# Patient Record
Sex: MALE | Race: BLACK OR AFRICAN AMERICAN | Employment: OTHER | URBAN - METROPOLITAN AREA
[De-identification: names, ages, dates, MRNs, and addresses within clinical notes are randomized per-mention and may not be internally consistent; named-entity substitution may affect disease eponyms.]

---

## 2019-05-23 ENCOUNTER — OFFICE VISIT (OUTPATIENT)
Dept: FAMILY MEDICINE | Facility: CLINIC | Age: 84
End: 2019-05-23
Payer: MEDICARE

## 2019-05-23 VITALS
WEIGHT: 136.56 LBS | HEART RATE: 86 BPM | DIASTOLIC BLOOD PRESSURE: 62 MMHG | BODY MASS INDEX: 22.75 KG/M2 | TEMPERATURE: 98 F | OXYGEN SATURATION: 96 % | SYSTOLIC BLOOD PRESSURE: 130 MMHG | HEIGHT: 65 IN

## 2019-05-23 DIAGNOSIS — R79.9 ABNORMAL FINDING OF BLOOD CHEMISTRY: ICD-10-CM

## 2019-05-23 DIAGNOSIS — M79.18 MYALGIA, OTHER SITE: ICD-10-CM

## 2019-05-23 DIAGNOSIS — K21.9 GASTROESOPHAGEAL REFLUX DISEASE, ESOPHAGITIS PRESENCE NOT SPECIFIED: ICD-10-CM

## 2019-05-23 DIAGNOSIS — Z12.5 ENCOUNTER FOR SCREENING FOR MALIGNANT NEOPLASM OF PROSTATE: ICD-10-CM

## 2019-05-23 DIAGNOSIS — R93.89 ABNORMAL FINDINGS ON DIAGNOSTIC IMAGING OF OTHER SPECIFIED BODY STRUCTURES: ICD-10-CM

## 2019-05-23 DIAGNOSIS — Z00.00 ROUTINE PHYSICAL EXAMINATION: Primary | ICD-10-CM

## 2019-05-23 DIAGNOSIS — Z11.4 ENCOUNTER FOR SCREENING FOR HIV: ICD-10-CM

## 2019-05-23 PROCEDURE — 99204 OFFICE O/P NEW MOD 45 MIN: CPT | Mod: S$GLB,,, | Performed by: FAMILY MEDICINE

## 2019-05-23 PROCEDURE — 99999 PR PBB SHADOW E&M-NEW PATIENT-LVL III: CPT | Mod: PBBFAC,,, | Performed by: FAMILY MEDICINE

## 2019-05-23 PROCEDURE — 99204 PR OFFICE/OUTPT VISIT, NEW, LEVL IV, 45-59 MIN: ICD-10-PCS | Mod: S$GLB,,, | Performed by: FAMILY MEDICINE

## 2019-05-23 PROCEDURE — 99999 PR PBB SHADOW E&M-NEW PATIENT-LVL III: ICD-10-PCS | Mod: PBBFAC,,, | Performed by: FAMILY MEDICINE

## 2019-05-23 RX ORDER — PHENOL/SODIUM PHENOLATE
1 AEROSOL, SPRAY (ML) MUCOUS MEMBRANE DAILY PRN
Qty: 30 EACH | Refills: 0 | Status: SHIPPED | OUTPATIENT
Start: 2019-05-23 | End: 2019-05-24 | Stop reason: SDUPTHER

## 2019-05-23 NOTE — PROGRESS NOTES
[facility]  Progress Note    SUBJECTIVE:     Chief Complaint   Patient presents with    Establish Care    No appetite     3 days     Abdominal Pain     3 days        HPI   Cale Anthony Sr.  is a 88 y.o. male  Here to establish care and for c/o epigastric abdominal pain. Rates pain as mild, dull. Has no appetite for 3 days. Also has been vomiting when he eats something. No blood or bile seen. hasn't tried anything for it. hasn't seen a doctor in over 50 years. Patient has no other new complaints/problems at this time.      Review of patient's allergies indicates:  No Known Allergies    History reviewed. No pertinent past medical history.  History reviewed. No pertinent surgical history.  Family History   Problem Relation Age of Onset    Diabetes Mother     No Known Problems Father     No Known Problems Sister     Cancer Brother      Social History     Tobacco Use    Smoking status: Never Smoker    Smokeless tobacco: Never Used   Substance Use Topics    Alcohol use: Not Currently    Drug use: Not Currently        Review of Systems   Constitutional: Negative for chills, diaphoresis, fever, malaise/fatigue and weight loss.        + no appetite   HENT: Negative.  Negative for congestion, ear pain and sore throat.    Eyes: Negative for photophobia and discharge.   Respiratory: Negative.  Negative for cough, shortness of breath and wheezing.    Cardiovascular: Negative.  Negative for chest pain and palpitations.   Gastrointestinal: Positive for abdominal pain and heartburn. Negative for blood in stool, constipation, diarrhea, melena, nausea and vomiting.   Genitourinary: Negative.  Negative for dysuria and hematuria.   Musculoskeletal: Negative for back pain and myalgias.   Skin: Negative for itching and rash.   Neurological: Negative for dizziness, sensory change, focal weakness, weakness and headaches.   All other systems reviewed and are negative.    OBJECTIVE:     Vitals:    05/23/19 1421   BP:  130/62   Pulse: 86   Temp: 98.2 °F (36.8 °C)     Body mass index is 22.73 kg/m².    Physical Exam   Constitutional: He is oriented to person, place, and time. He appears distressed (mild).   HENT:   Head: Normocephalic and atraumatic.   Right Ear: Tympanic membrane is not perforated, not erythematous and not bulging. No hemotympanum.   Left Ear: Tympanic membrane is not perforated, not erythematous and not bulging. No hemotympanum.   Nose: Nose normal.   Mouth/Throat: Oropharynx is clear and moist. No oropharyngeal exudate.   Eyes: Pupils are equal, round, and reactive to light. Conjunctivae and EOM are normal.   Neck: No thyromegaly present.   Cardiovascular: Normal rate, regular rhythm and normal heart sounds. Exam reveals no gallop and no friction rub.   No murmur heard.  Pulmonary/Chest: Effort normal and breath sounds normal. No respiratory distress. He has no wheezes. He has no rales. He exhibits no tenderness.   Abdominal: Soft. Bowel sounds are normal. He exhibits no distension. There is tenderness (to palpation of epigastric area). There is no rebound and no guarding.   Musculoskeletal: Normal range of motion. He exhibits no edema or tenderness.   Lymphadenopathy:     He has no cervical adenopathy.   Neurological: He is alert and oriented to person, place, and time.   Skin: Skin is warm. No rash noted. He is not diaphoretic. No erythema.       Old records were reviewed. Labs and/or images were independently reviewed.    ASSESSMENT     1. Routine physical examination    2. Abnormal finding of blood chemistry     3. Abnormal findings on diagnostic imaging of other specified body structures     4. Encounter for screening for malignant neoplasm of prostate     5. Myalgia, other site     6. Encounter for screening for HIV     7. Gastroesophageal reflux disease, esophagitis presence not specified        PLAN:     Routine physical examination  -     CBC auto differential; Future  -     Comprehensive metabolic  panel; Future  -     Hemoglobin A1c; Future  -     Lipid panel; Future  -     TSH; Future  -     T4, free; Future  -     PSA, Screening; Future; Expected date: 05/23/2019  -     Vitamin D; Future; Expected date: 05/23/2019  -     HIV 1/2 Ag/Ab (4th Gen); Future; Expected date: 05/23/2019  -     Hepatitis panel, acute; Future; Expected date: 05/23/2019  -     We briefly discussed diet, exercise, and routine preventive exams. All questions and comments addressed.    Encounter for screening for malignant neoplasm of prostate   -     PSA, Screening; Future; Expected date: 05/23/2019    Encounter for screening for HIV   -     HIV 1/2 Ag/Ab (4th Gen); Future; Expected date: 05/23/2019    Gastroesophageal reflux disease, esophagitis presence not specified  -     (pyxis) gi cocktail (mylanta 30 mL, lidocaine 2 % viscous 10 mL, dicyclomine 10 mL) 50 mL  -     Start omeprazole 20 mg TbEC; Take 1 tablet by mouth daily as needed.  Dispense: 30 each; Refill: 0  -     Counseled patient about healthy diet, exercise habits, and to increase physical activity.    RTC KATHYA Santoyo MD  05/23/2019 2:42 PM

## 2019-05-24 ENCOUNTER — TELEPHONE (OUTPATIENT)
Dept: FAMILY MEDICINE | Facility: CLINIC | Age: 84
End: 2019-05-24

## 2019-05-24 ENCOUNTER — LAB VISIT (OUTPATIENT)
Dept: LAB | Facility: HOSPITAL | Age: 84
End: 2019-05-24
Attending: FAMILY MEDICINE
Payer: MEDICARE

## 2019-05-24 DIAGNOSIS — M79.18 MYALGIA, OTHER SITE: ICD-10-CM

## 2019-05-24 DIAGNOSIS — R93.89 ABNORMAL FINDINGS ON DIAGNOSTIC IMAGING OF OTHER SPECIFIED BODY STRUCTURES: ICD-10-CM

## 2019-05-24 DIAGNOSIS — K21.9 GASTROESOPHAGEAL REFLUX DISEASE, ESOPHAGITIS PRESENCE NOT SPECIFIED: ICD-10-CM

## 2019-05-24 DIAGNOSIS — R97.20 ELEVATED PSA: ICD-10-CM

## 2019-05-24 DIAGNOSIS — R79.9 ABNORMAL FINDING OF BLOOD CHEMISTRY: ICD-10-CM

## 2019-05-24 DIAGNOSIS — Z11.4 ENCOUNTER FOR SCREENING FOR HIV: ICD-10-CM

## 2019-05-24 DIAGNOSIS — E55.9 VITAMIN D DEFICIENCY: Primary | ICD-10-CM

## 2019-05-24 DIAGNOSIS — Z00.00 ROUTINE PHYSICAL EXAMINATION: ICD-10-CM

## 2019-05-24 DIAGNOSIS — Z12.5 ENCOUNTER FOR SCREENING FOR MALIGNANT NEOPLASM OF PROSTATE: ICD-10-CM

## 2019-05-24 LAB
25(OH)D3+25(OH)D2 SERPL-MCNC: 9 NG/ML (ref 30–96)
ALBUMIN SERPL BCP-MCNC: 3.8 G/DL (ref 3.5–5.2)
ALP SERPL-CCNC: 52 U/L (ref 55–135)
ALT SERPL W/O P-5'-P-CCNC: 8 U/L (ref 10–44)
ANION GAP SERPL CALC-SCNC: 9 MMOL/L (ref 8–16)
AST SERPL-CCNC: 15 U/L (ref 10–40)
BASOPHILS # BLD AUTO: 0.03 K/UL (ref 0–0.2)
BASOPHILS NFR BLD: 0.2 % (ref 0–1.9)
BILIRUB SERPL-MCNC: 0.6 MG/DL (ref 0.1–1)
BUN SERPL-MCNC: 15 MG/DL (ref 8–23)
CALCIUM SERPL-MCNC: 10.1 MG/DL (ref 8.7–10.5)
CHLORIDE SERPL-SCNC: 99 MMOL/L (ref 95–110)
CHOLEST SERPL-MCNC: 184 MG/DL (ref 120–199)
CHOLEST/HDLC SERPL: 3.2 {RATIO} (ref 2–5)
CO2 SERPL-SCNC: 29 MMOL/L (ref 23–29)
COMPLEXED PSA SERPL-MCNC: 5.7 NG/ML (ref 0–4)
CREAT SERPL-MCNC: 0.8 MG/DL (ref 0.5–1.4)
DIFFERENTIAL METHOD: ABNORMAL
EOSINOPHIL # BLD AUTO: 0 K/UL (ref 0–0.5)
EOSINOPHIL NFR BLD: 0.1 % (ref 0–8)
ERYTHROCYTE [DISTWIDTH] IN BLOOD BY AUTOMATED COUNT: 13.8 % (ref 11.5–14.5)
EST. GFR  (AFRICAN AMERICAN): >60 ML/MIN/1.73 M^2
EST. GFR  (NON AFRICAN AMERICAN): >60 ML/MIN/1.73 M^2
ESTIMATED AVG GLUCOSE: 111 MG/DL (ref 68–131)
GLUCOSE SERPL-MCNC: 118 MG/DL (ref 70–110)
HAV IGM SERPL QL IA: NEGATIVE
HBA1C MFR BLD HPLC: 5.5 % (ref 4–5.6)
HBV CORE IGM SERPL QL IA: NEGATIVE
HBV SURFACE AG SERPL QL IA: NEGATIVE
HCT VFR BLD AUTO: 43.4 % (ref 40–54)
HCV AB SERPL QL IA: NEGATIVE
HDLC SERPL-MCNC: 57 MG/DL (ref 40–75)
HDLC SERPL: 31 % (ref 20–50)
HGB BLD-MCNC: 14 G/DL (ref 14–18)
HIV 1+2 AB+HIV1 P24 AG SERPL QL IA: NEGATIVE
IMM GRANULOCYTES # BLD AUTO: 0.05 K/UL (ref 0–0.04)
IMM GRANULOCYTES NFR BLD AUTO: 0.4 % (ref 0–0.5)
LDLC SERPL CALC-MCNC: 114.8 MG/DL (ref 63–159)
LYMPHOCYTES # BLD AUTO: 1.8 K/UL (ref 1–4.8)
LYMPHOCYTES NFR BLD: 13.1 % (ref 18–48)
MCH RBC QN AUTO: 26.6 PG (ref 27–31)
MCHC RBC AUTO-ENTMCNC: 32.3 G/DL (ref 32–36)
MCV RBC AUTO: 83 FL (ref 82–98)
MONOCYTES # BLD AUTO: 0.9 K/UL (ref 0.3–1)
MONOCYTES NFR BLD: 6.5 % (ref 4–15)
NEUTROPHILS # BLD AUTO: 10.9 K/UL (ref 1.8–7.7)
NEUTROPHILS NFR BLD: 79.7 % (ref 38–73)
NONHDLC SERPL-MCNC: 127 MG/DL
NRBC BLD-RTO: 0 /100 WBC
PLATELET # BLD AUTO: 261 K/UL (ref 150–350)
PMV BLD AUTO: 11.6 FL (ref 9.2–12.9)
POTASSIUM SERPL-SCNC: 4.1 MMOL/L (ref 3.5–5.1)
PROT SERPL-MCNC: 7.9 G/DL (ref 6–8.4)
RBC # BLD AUTO: 5.26 M/UL (ref 4.6–6.2)
SODIUM SERPL-SCNC: 137 MMOL/L (ref 136–145)
T4 FREE SERPL-MCNC: 1.25 NG/DL (ref 0.71–1.51)
TRIGL SERPL-MCNC: 61 MG/DL (ref 30–150)
TSH SERPL DL<=0.005 MIU/L-ACNC: 0.82 UIU/ML (ref 0.4–4)
WBC # BLD AUTO: 13.7 K/UL (ref 3.9–12.7)

## 2019-05-24 PROCEDURE — 84439 ASSAY OF FREE THYROXINE: CPT

## 2019-05-24 PROCEDURE — 80053 COMPREHEN METABOLIC PANEL: CPT

## 2019-05-24 PROCEDURE — 36415 COLL VENOUS BLD VENIPUNCTURE: CPT | Mod: PO

## 2019-05-24 PROCEDURE — 84443 ASSAY THYROID STIM HORMONE: CPT

## 2019-05-24 PROCEDURE — 86703 HIV-1/HIV-2 1 RESULT ANTBDY: CPT

## 2019-05-24 PROCEDURE — 80074 ACUTE HEPATITIS PANEL: CPT

## 2019-05-24 PROCEDURE — 84153 ASSAY OF PSA TOTAL: CPT

## 2019-05-24 PROCEDURE — 82306 VITAMIN D 25 HYDROXY: CPT

## 2019-05-24 PROCEDURE — 85025 COMPLETE CBC W/AUTO DIFF WBC: CPT

## 2019-05-24 PROCEDURE — 83036 HEMOGLOBIN GLYCOSYLATED A1C: CPT

## 2019-05-24 PROCEDURE — 80061 LIPID PANEL: CPT

## 2019-05-24 RX ORDER — ERGOCALCIFEROL 1.25 MG/1
50000 CAPSULE ORAL
Qty: 12 CAPSULE | Refills: 3 | Status: SHIPPED | OUTPATIENT
Start: 2019-05-24 | End: 2020-05-19

## 2019-05-24 RX ORDER — PHENOL/SODIUM PHENOLATE
1 AEROSOL, SPRAY (ML) MUCOUS MEMBRANE DAILY PRN
Qty: 30 EACH | Refills: 0 | Status: SHIPPED | OUTPATIENT
Start: 2019-05-24 | End: 2019-06-10 | Stop reason: SDUPTHER

## 2019-05-24 NOTE — TELEPHONE ENCOUNTER
----- Message from Radha Wang sent at 5/24/2019  7:24 AM CDT -----  Contact: Mr Willie Anthony   /   son [phone]  Type: New Prescription patient request [facility] pharmacy     Patient seen by doctor on yesterday new prescription for Omeprazole was called into [facility].   Patient states was inform by [facility] did not receive prescription     Who Called:   Mr Anthony/ patient son    Refill or New Rx: new prescription omeprazole 20 mg TbEC   Patient requesting that prescription be called into [facility] in [place]     RX Name and Strength:omeprazole 20 mg TbEC     How is the patient currently taking it? (ex. 1XDay)  Is this a 30 day or 90 day RX  Preferred Pharmacy with phone number:  [facility] in [place]  Local or Mail Order:  Ordering Provider:Dr Santoyo  Would the patient rather a call back or a response via [facility]? call  Best Call Back Number:[phone]  Additional Information: Please call Mr Anthony to let know done

## 2019-06-04 ENCOUNTER — TELEPHONE (OUTPATIENT)
Dept: ADMINISTRATIVE | Facility: HOSPITAL | Age: 84
End: 2019-06-04

## 2019-06-07 ENCOUNTER — TELEPHONE (OUTPATIENT)
Dept: FAMILY MEDICINE | Facility: CLINIC | Age: 84
End: 2019-06-07

## 2019-06-07 NOTE — TELEPHONE ENCOUNTER
----- Message from Shaina Guzman sent at 6/7/2019  3:25 PM CDT -----  Patient's son is requesting patient's lab and test results. He never received any notice. please call at [phone]. thanks

## 2019-06-07 NOTE — TELEPHONE ENCOUNTER
Spoke with pt informed of results and referral placed. Advised pt if he would like his son be informed of results that and involvement of care form must be completed. Pt states he complete this during next OV. Pt verbalized understanding.

## 2019-06-10 ENCOUNTER — OFFICE VISIT (OUTPATIENT)
Dept: FAMILY MEDICINE | Facility: CLINIC | Age: 84
End: 2019-06-10
Payer: MEDICARE

## 2019-06-10 ENCOUNTER — TELEPHONE (OUTPATIENT)
Dept: FAMILY MEDICINE | Facility: CLINIC | Age: 84
End: 2019-06-10

## 2019-06-10 ENCOUNTER — HOSPITAL ENCOUNTER (OUTPATIENT)
Dept: RADIOLOGY | Facility: HOSPITAL | Age: 84
Discharge: HOME | End: 2019-06-10
Attending: FAMILY MEDICINE
Payer: MEDICARE

## 2019-06-10 VITALS
TEMPERATURE: 98 F | WEIGHT: 135.06 LBS | OXYGEN SATURATION: 97 % | HEART RATE: 71 BPM | HEIGHT: 65 IN | DIASTOLIC BLOOD PRESSURE: 70 MMHG | SYSTOLIC BLOOD PRESSURE: 136 MMHG | BODY MASS INDEX: 22.5 KG/M2

## 2019-06-10 DIAGNOSIS — K59.00 CONSTIPATION, UNSPECIFIED CONSTIPATION TYPE: ICD-10-CM

## 2019-06-10 DIAGNOSIS — R31.9 HEMATURIA, UNSPECIFIED TYPE: Primary | ICD-10-CM

## 2019-06-10 DIAGNOSIS — K59.00 CONSTIPATION, UNSPECIFIED CONSTIPATION TYPE: Primary | ICD-10-CM

## 2019-06-10 DIAGNOSIS — K21.9 GASTROESOPHAGEAL REFLUX DISEASE, ESOPHAGITIS PRESENCE NOT SPECIFIED: ICD-10-CM

## 2019-06-10 DIAGNOSIS — R10.9 ABDOMINAL CRAMPS: ICD-10-CM

## 2019-06-10 PROCEDURE — 74019 RADEX ABDOMEN 2 VIEWS: CPT | Mod: 26,,, | Performed by: RADIOLOGY

## 2019-06-10 PROCEDURE — 99999 PR PBB SHADOW E&M-EST. PATIENT-LVL III: ICD-10-PCS | Mod: PBBFAC,,, | Performed by: FAMILY MEDICINE

## 2019-06-10 PROCEDURE — 1101F PR PT FALLS ASSESS DOC 0-1 FALLS W/OUT INJ PAST YR: ICD-10-PCS | Mod: CPTII,S$GLB,, | Performed by: FAMILY MEDICINE

## 2019-06-10 PROCEDURE — 1101F PT FALLS ASSESS-DOCD LE1/YR: CPT | Mod: CPTII,S$GLB,, | Performed by: FAMILY MEDICINE

## 2019-06-10 PROCEDURE — 99999 PR PBB SHADOW E&M-EST. PATIENT-LVL III: CPT | Mod: PBBFAC,,, | Performed by: FAMILY MEDICINE

## 2019-06-10 PROCEDURE — 99214 OFFICE O/P EST MOD 30 MIN: CPT | Mod: S$GLB,,, | Performed by: FAMILY MEDICINE

## 2019-06-10 PROCEDURE — 74019 XR ABDOMEN FLAT AND ERECT: ICD-10-PCS | Mod: 26,,, | Performed by: RADIOLOGY

## 2019-06-10 PROCEDURE — 74019 RADEX ABDOMEN 2 VIEWS: CPT | Mod: TC,FY,PO

## 2019-06-10 PROCEDURE — 99214 PR OFFICE/OUTPT VISIT, EST, LEVL IV, 30-39 MIN: ICD-10-PCS | Mod: S$GLB,,, | Performed by: FAMILY MEDICINE

## 2019-06-10 RX ORDER — DOCUSATE SODIUM 100 MG/1
100 CAPSULE, LIQUID FILLED ORAL 2 TIMES DAILY PRN
Qty: 60 CAPSULE | Refills: 3 | Status: ON HOLD | OUTPATIENT
Start: 2019-06-10 | End: 2019-06-30 | Stop reason: HOSPADM

## 2019-06-10 RX ORDER — PHENOL/SODIUM PHENOLATE
1 AEROSOL, SPRAY (ML) MUCOUS MEMBRANE DAILY PRN
Qty: 30 EACH | Refills: 2 | Status: SHIPPED | OUTPATIENT
Start: 2019-06-10 | End: 2019-07-08

## 2019-06-10 NOTE — PROGRESS NOTES
[facility]  Progress Note    SUBJECTIVE:     Chief Complaint   Patient presents with    Abdominal Pain     states it staretd 6 days ago        HPI   Cale Anthony Sr.  is a 88 y.o. male here with son for c/o abdominal pain/cramps for the past 5-6 days. Last bowel movement was around that time. Hasn't had much of an appetite. No current pain, fevers, chills, blood in the stool at this time. Still takes the omeprazole as needed.    Review of patient's allergies indicates:  No Known Allergies    History reviewed. No pertinent past medical history.  History reviewed. No pertinent surgical history.  Family History   Problem Relation Age of Onset    Diabetes Mother     No Known Problems Father     No Known Problems Sister     Cancer Brother      Social History     Tobacco Use    Smoking status: Never Smoker    Smokeless tobacco: Never Used   Substance Use Topics    Alcohol use: Not Currently    Drug use: Not Currently        Review of Systems   Constitutional: Negative for chills, fever and malaise/fatigue.   HENT: Negative.    Respiratory: Negative.  Negative for cough and shortness of breath.    Cardiovascular: Negative.  Negative for chest pain.   Gastrointestinal: Positive for abdominal pain (cramps) and constipation. Negative for nausea and vomiting.   Genitourinary: Negative.    Neurological: Negative for weakness and headaches.   All other systems reviewed and are negative.    OBJECTIVE:     Vitals:    06/10/19 1108   BP: 136/70   Pulse: 71   Temp: 98 °F (36.7 °C)     Body mass index is 22.47 kg/m².    Physical Exam   Constitutional: He is oriented to person, place, and time and well-developed, well-nourished, and in no distress. No distress.   HENT:   Head: Normocephalic and atraumatic.   Nose: Nose normal.   Eyes: Conjunctivae and EOM are normal.   Cardiovascular: Normal rate, regular rhythm and normal heart sounds. Exam reveals no gallop and no friction rub.   No murmur  heard.  Pulmonary/Chest: Effort normal and breath sounds normal. No respiratory distress. He has no wheezes. He has no rales. He exhibits no tenderness.   Abdominal: Soft. Bowel sounds are normal. He exhibits no distension. There is no tenderness (no epigastric or pelvic pain). There is no rebound.   Neurological: He is alert and oriented to person, place, and time.   Skin: Skin is warm. He is not diaphoretic.       Old records were reviewed. Labs and/or images were independently reviewed.    ASSESSMENT     1. Constipation, unspecified constipation type    2. Gastroesophageal reflux disease, esophagitis presence not specified    3. Abdominal cramps        PLAN:     Constipation, unspecified constipation type  -     Start docusate sodium (COLACE) 100 MG capsule; Take 1 capsule (100 mg total) by mouth 2 (two) times daily as needed for Constipation.  Dispense: 60 capsule; Refill: 3  -     X-Ray Abdomen Flat And Erect; Future; Expected date: 06/10/2019  -     Discussed importance of increasing fiber.     Gastroesophageal reflux disease, esophagitis presence not specified  -     omeprazole 20 mg TbEC; Take 1 tablet by mouth daily as needed.  Dispense: 30 each; Refill: 2  -     Counseled patient about healthy diet, exercise habits, and to increase physical activity.    Abdominal cramps  -     Urinalysis; Future. R/o UTI for completeness.      RTC PRCARLA Santoyo MD  06/10/2019 11:24 AM

## 2019-06-11 NOTE — TELEPHONE ENCOUNTER
Blood seen in urine. Refer to urology.  Spoke with the pt and informed  Of the above details.  Patient verbalized understandings.

## 2019-06-12 ENCOUNTER — TELEPHONE (OUTPATIENT)
Dept: FAMILY MEDICINE | Facility: CLINIC | Age: 84
End: 2019-06-12

## 2019-06-12 NOTE — TELEPHONE ENCOUNTER
----- Message from Shashank Crook sent at 6/12/2019  8:38 AM CDT -----  Contact: Self/[phone]  Type: Patient Call Back    Who called:Patient    What is the request in detail:The patient would like to speak top the staff regarding a recent test.    Would the patient rather a call back or a response via [facility]? Call back    Best call back number:[phone]      Thank you

## 2019-06-12 NOTE — TELEPHONE ENCOUNTER
Spoke w/Pts son stating the pt received a call but didn't understand the results. Informed of results and referral placed. [facility] pt has a appt scheduled 7/3/19 with Urology.

## 2019-06-13 ENCOUNTER — OFFICE VISIT (OUTPATIENT)
Dept: UROLOGY | Facility: CLINIC | Age: 84
End: 2019-06-13
Payer: MEDICARE

## 2019-06-13 ENCOUNTER — LAB VISIT (OUTPATIENT)
Dept: LAB | Facility: HOSPITAL | Age: 84
End: 2019-06-13
Payer: MEDICARE

## 2019-06-13 VITALS
HEART RATE: 68 BPM | SYSTOLIC BLOOD PRESSURE: 183 MMHG | HEIGHT: 65 IN | BODY MASS INDEX: 22.51 KG/M2 | DIASTOLIC BLOOD PRESSURE: 72 MMHG | WEIGHT: 135.13 LBS

## 2019-06-13 DIAGNOSIS — R31.29 MICROSCOPIC HEMATURIA: ICD-10-CM

## 2019-06-13 DIAGNOSIS — N40.1 BPH WITH URINARY OBSTRUCTION: ICD-10-CM

## 2019-06-13 DIAGNOSIS — R97.20 ELEVATED PSA: ICD-10-CM

## 2019-06-13 DIAGNOSIS — N40.1 BPH WITH URINARY OBSTRUCTION: Primary | ICD-10-CM

## 2019-06-13 DIAGNOSIS — N13.8 BPH WITH URINARY OBSTRUCTION: Primary | ICD-10-CM

## 2019-06-13 DIAGNOSIS — N13.8 BPH WITH URINARY OBSTRUCTION: ICD-10-CM

## 2019-06-13 LAB
BACTERIA #/AREA URNS AUTO: ABNORMAL /HPF
BILIRUB SERPL-MCNC: NORMAL MG/DL
BILIRUB UR QL STRIP: NEGATIVE
BLOOD URINE, POC: NORMAL
CLARITY UR REFRACT.AUTO: CLEAR
COLOR UR AUTO: ABNORMAL
COLOR, POC UA: NORMAL
GLUCOSE UR QL STRIP: NEGATIVE
GLUCOSE UR QL STRIP: NORMAL
HGB UR QL STRIP: ABNORMAL
HYALINE CASTS UR QL AUTO: 2 /LPF
KETONES UR QL STRIP: NEGATIVE
KETONES UR QL STRIP: NORMAL
LEUKOCYTE ESTERASE UR QL STRIP: ABNORMAL
LEUKOCYTE ESTERASE URINE, POC: NORMAL
MICROSCOPIC COMMENT: ABNORMAL
NITRITE UR QL STRIP: NEGATIVE
NITRITE, POC UA: NORMAL
PH UR STRIP: 6 [PH] (ref 5–8)
PH, POC UA: 6
PROT UR QL STRIP: NEGATIVE
PROTEIN, POC: NORMAL
RBC #/AREA URNS AUTO: 10 /HPF (ref 0–4)
SP GR UR STRIP: 1.02 (ref 1–1.03)
SPECIFIC GRAVITY, POC UA: 1.01
SQUAMOUS #/AREA URNS AUTO: 1 /HPF
URN SPEC COLLECT METH UR: ABNORMAL
UROBILINOGEN, POC UA: NORMAL
WBC #/AREA URNS AUTO: 40 /HPF (ref 0–5)

## 2019-06-13 PROCEDURE — 88112 CYTOLOGY SPECIMEN-URINE: ICD-10-PCS | Mod: 26,,, | Performed by: PATHOLOGY

## 2019-06-13 PROCEDURE — 81002 URINALYSIS NONAUTO W/O SCOPE: CPT | Mod: S$GLB,,, | Performed by: NURSE PRACTITIONER

## 2019-06-13 PROCEDURE — 1101F PR PT FALLS ASSESS DOC 0-1 FALLS W/OUT INJ PAST YR: ICD-10-PCS | Mod: CPTII,S$GLB,, | Performed by: NURSE PRACTITIONER

## 2019-06-13 PROCEDURE — 81002 POCT URINE DIPSTICK WITHOUT MICROSCOPE: ICD-10-PCS | Mod: S$GLB,,, | Performed by: NURSE PRACTITIONER

## 2019-06-13 PROCEDURE — 81001 URINALYSIS AUTO W/SCOPE: CPT

## 2019-06-13 PROCEDURE — 99203 OFFICE O/P NEW LOW 30 MIN: CPT | Mod: 25,S$GLB,, | Performed by: NURSE PRACTITIONER

## 2019-06-13 PROCEDURE — 36415 COLL VENOUS BLD VENIPUNCTURE: CPT

## 2019-06-13 PROCEDURE — 84154 ASSAY OF PSA FREE: CPT

## 2019-06-13 PROCEDURE — 1101F PT FALLS ASSESS-DOCD LE1/YR: CPT | Mod: CPTII,S$GLB,, | Performed by: NURSE PRACTITIONER

## 2019-06-13 PROCEDURE — 99999 PR PBB SHADOW E&M-EST. PATIENT-LVL III: CPT | Mod: PBBFAC,,, | Performed by: NURSE PRACTITIONER

## 2019-06-13 PROCEDURE — 99999 PR PBB SHADOW E&M-EST. PATIENT-LVL III: ICD-10-PCS | Mod: PBBFAC,,, | Performed by: NURSE PRACTITIONER

## 2019-06-13 PROCEDURE — 99203 PR OFFICE/OUTPT VISIT, NEW, LEVL III, 30-44 MIN: ICD-10-PCS | Mod: 25,S$GLB,, | Performed by: NURSE PRACTITIONER

## 2019-06-13 PROCEDURE — 84153 ASSAY OF PSA TOTAL: CPT

## 2019-06-13 PROCEDURE — 88112 CYTOPATH CELL ENHANCE TECH: CPT | Performed by: PATHOLOGY

## 2019-06-13 NOTE — PATIENT INSTRUCTIONS
BPH (Enlarged Prostate)  The prostate is a gland at the base of the bladder. As some men get older, the prostate may get bigger in size. This problem is called benign prostatic hyperplasia (BPH). BPH puts pressure on the urethra. This is the tube that carries urine from the bladder to the penis. It may interfere with the flow of urine. It may also keep the bladder from emptying fully.    Symptoms of BPH include trouble starting urination and feeling as though the bladder isnt emptying all the way. It also includes a weak urine stream, dribbling and leaking of urine, and frequent and urgent urination (especially at night). BPH can increase the risk of urinary infections. It can also block off urine flow completely. If this occurs, a thin tube (catheter) may be passed into the bladder to help drain urine.  If symptoms are mild, no treatment may be needed right now. If symptoms are more severe, treatment is likely needed. The goal of treatment is to improve urine flow and reduce symptoms. Treatments can include medicine and procedures. Your healthcare provider will discuss treatment options with you as needed.  Home care  The following guidelines will help you care for yourself at home:  · Urinate as soon as you feel the urge. Don't try to hold your urine.  · Don't limit your fluid intake during the day. Drink 6 to 8 glasses of water or liquids a day. This prevents bacteria from building up in the bladder.  · Avoid drinking fluids after dinner to help reduce urination during the night.  · Avoid medicines that can worsen your symptoms. These include certain cold and allergy medicines and antidepressants. Diuretics used for high blood pressure can also worsen symptoms. Talk to your doctor about the medicines you take. Other choices may work better for you.  Prostate cancer screening  BPH does not increase the risk of prostate cancer. But because prostate cancer is a common cancer in men, screening is sometimes  recommended. This may help detect the cancer in its early stages when treatment is most effective. Factors that can increase the risk of prostate cancer include being -American or having a father or brother who had prostate cancer. A high-fat diet may also increase the risk of prostate cancer. Talk to your healthcare provider to see whether you should be screened for prostate cancer.  Follow-up care  Follow up with your healthcare provider, or as advised  To learn more, go to:  · National Kidney & Urologic Diseases Information Clearinghouse  kidney.niddk.nih.gov, 103.949.1061  When to seek medical advice  Call your healthcare provider right away if any of these occur:  · Fever of 100.4°F (38.0°C) or higher, or as advised  · Unable to pass urine for 8 hours  · Increasing pressure or pain in your bladder (lower abdomen)  · Blood in the urine  · Increasing low back pain, not related to injury  · Symptoms of urinary infection (increased urge to urinate, burning when passing urine, foul-smelling urine)  Date Last Reviewed: 7/1/2016 © 2000-2017 [employer]. [address] [place] [address]. All rights reserved. This information is not intended as a substitute for professional medical care. Always follow your healthcare professional's instructions.        What is Hematuria?  Blood in your urine is a condition known as hematuria. Most of the time, the cause of hematuria is not serious. But, never ignore blood in the urine. Your doctor can evaluate you to find the cause of the bleeding and treat it, if needed.  Types of hematuria  · Gross hematuria means that the blood can be seen by the naked eye. The urine may look pinkish, brownish, or bright red.  · Microscopic hematuria means that the urine is clear, but blood cells can be seen when urine is looked at under a microscope or tested in a lab.  Both types of hematuria can have the same causes. Neither one is necessarily more serious than the  other. With either type, you may have other symptoms, such as pain, pressure, or burning when you urinate, abdominal pain, or back pain. Or, you may not have any other symptoms. No matter how much blood is found, the cause of the bleeding needs to be identified.  Finding the cause of hematuria  To evaluate your condition, your doctor will first confirm that blood is indeed present. Then other tests are done to pinpoint where the blood is coming from and why. Your doctor will decide which tests will best determine the cause of your hematuria. Some common tests are listed below.  · History and physical exam  · Lab tests may include urinalysis, a urine culture, a urine cytology, and various blood tests  · Cystoscopy  · Computed tomography (CT) or CT urography  · Magnetic resonance imaging (MRI) or MR urography  · Ultrasound of the kidney  · Kidney biopsy  Causes of hematuria include the very benign (exercised induced hematuria) to the very severe (cancer of the urinary system). A variety of treatments are available depending on the cause.  Date Last Reviewed: 12/2/2016 © 2000-2017 [employer]. [address] [place] [address]. All rights reserved. This information is not intended as a substitute for professional medical care. Always follow your healthcare professional's instructions.        Hematuria: Possible Causes     Many things can lead to blood in the urine (hematuria). The blood may be seen with the eye (macroscopic or gross hematuria). Or it may only be seen when the urine is looked at under a microscope (microscopic hematuria). Some of the most common causes of blood in the urine are listed below. Often, no cause for the blood can be found. This is called idiopathic hematuria.  · Kidney or bladder stones are collections of crystals. They form in the urine. Stones may be found anywhere in the urinary tract. But they form most often in the kidneys or bladder. In addition to blood in the  urine, they can cause severe pain.  · BPH stands for benign prostatic hyperplasia. It is enlargement of the prostate gland. It happens as men age. BPH often causes problems with urination. It sometimes causes blood in the urine.  · A urinary tract infection (UTI) is due to bacteria growing in the urinary tract. It can cause blood in the urine. Other symptoms include burning or pain with urination. You may need to urinate often or urgently. You may also have a fever.  · Damage to the urinary tract may cause blood in the urine. This damage may be due to a blow or accident. It may also result from the use of a urinary catheter. Very hard exercise may sometimes irritate the urinary tract and cause bleeding.  · Cancer may occur anywhere in the urinary tract. A tumor may sometimes cause no symptoms other than bleeding.     Other possible causes of bleeding include:  · Prostatitis (infection of the prostate gland)  · Taking anticoagulants  · Blockage in the urinary tract  · Disease or inflammation of the kidney  · Cystic diseases of the kidneys  · Sickle cell anemia  · Vigorous exercise  · Endometriosis  Date Last Reviewed: 12/1/2016 © 2000-2017 [employer]. [address] [place] [address]. All rights reserved. This information is not intended as a substitute for professional medical care. Always follow your healthcare professional's instructions.

## 2019-06-13 NOTE — PROGRESS NOTES
Subjective:       Patient ID: Cale Anthony Sr. is a 88 y.o. male.    Chief Complaint: Hematuria    Cale Anthony Sr. is a 88 y.o. Male new to our Urology clinic  He is here today with his son for evaluation of blood in his urine.  This was found by his PCP when he went in for abdominal pain/constipation.  His UA showed 9 RBC's  He has never seen blood.  FOS good for him  Nocturia 2-3x depending on fluid intake.    No family history of Prostate Cancer.                             PSA                      5.7 (H)             05/24/2019                      History reviewed. No pertinent past medical history.    History reviewed. No pertinent surgical history.    Review of patient's family history indicates:  Problem: Diabetes      Relation: Mother          Age of Onset: (Not Specified)  Problem: No Known Problems      Relation: Father          Age of Onset: (Not Specified)  Problem: No Known Problems      Relation: Sister          Age of Onset: (Not Specified)  Problem: Cancer      Relation: Brother          Age of Onset: (Not Specified)      Social History    Socioeconomic History      Marital status: Single      Spouse name: Not on file      Number of children: Not on file      Years of education: Not on file      Highest education level: Not on file    Occupational History      Not on file    Social Needs      Financial resource strain: Not on file      Food insecurity:        Worry: Not on file        Inability: Not on file      Transportation needs:        Medical: Not on file        Non-medical: Not on file    Tobacco Use      Smoking status: Never Smoker      Smokeless tobacco: Never Used    Substance and Sexual Activity      Alcohol use: Not Currently      Drug use: Not Currently      Sexual activity: Not on file    Lifestyle      Physical activity:        Days per week: Not on file        Minutes per session: Not on file      Stress: Not on file    Relationships      Social connections:        Talks on  phone: Not on file        Gets together: Not on file        Attends [employer] service: Not on file        Active member of club or organization: Not on file        Attends meetings of clubs or organizations: Not on file        Relationship status: Not on file    Other Topics      Concerns:        Not on file    Social History Narrative      Not on file      Allergies:  Patient has no known allergies.    Medications:  Current Outpatient Medications:   docusate sodium (COLACE) 100 MG capsule, Take 1 capsule (100 mg total) by mouth 2 (two) times daily as needed for Constipation., Disp: 60 capsule, Rfl: 3  ergocalciferol (ERGOCALCIFEROL) 50,000 unit Cap, Take 1 capsule (50,000 Units total) by mouth every 7 days., Disp: 12 capsule, Rfl: 3  omeprazole 20 mg TbEC, Take 1 tablet by mouth daily as needed., Disp: 30 each, Rfl: 2        Review of Systems   Constitutional: Negative for activity change, appetite change, chills and fever.   HENT: Negative for facial swelling and trouble swallowing.    Eyes: Negative for visual disturbance.   Respiratory: Negative for chest tightness and shortness of breath.    Cardiovascular: Negative for chest pain and palpitations.   Gastrointestinal: Negative.  Negative for abdominal pain, constipation, diarrhea, nausea and vomiting.   Genitourinary: Positive for nocturia. Negative for difficulty urinating, dysuria, flank pain, hematuria, penile pain, penile swelling, scrotal swelling and testicular pain.        He is pleased with how he urinates.     Musculoskeletal: Positive for arthralgias. Negative for back pain, gait problem, myalgias and neck stiffness.   Skin: Negative for rash.   Neurological: Negative for dizziness and speech difficulty.   Hematological: Does not bruise/bleed easily.   Psychiatric/Behavioral: Negative for behavioral problems.       Objective:      Physical Exam   Nursing note and vitals reviewed.  Constitutional: He is oriented to person, place, and time. Vital  signs are normal. He appears well-developed and well-nourished. He is active and cooperative.  Non-toxic appearance. He does not have a sickly appearance.   Urine dipped clear of infection.     HENT:   Head: Normocephalic and atraumatic.   Right Ear: External ear normal.   Left Ear: External ear normal.   Nose: Nose normal.   Mouth/Throat: Mucous membranes are normal.   Eyes: Conjunctivae and lids are normal. No scleral icterus.   Neck: Trachea normal, normal range of motion and full passive range of motion without pain. Neck supple. No JVD present. No tracheal deviation present.   Cardiovascular: Normal rate, S1 normal and S2 normal.    Pulmonary/Chest: Effort normal. No respiratory distress. He exhibits no tenderness.   Abdominal: Soft. Normal appearance and bowel sounds are normal. There is no hepatosplenomegaly. There is no tenderness. There is no CVA tenderness.   Genitourinary: Rectum normal and penis normal. Rectal exam shows no external hemorrhoid, no mass and no tenderness. Prostate is enlarged. Prostate is not tender. Uncircumcised. No penile tenderness.       Musculoskeletal: Normal range of motion.   Neurological: He is alert and oriented to person, place, and time. He has normal strength.   Skin: Skin is warm, dry and intact.     Psychiatric: He has a normal mood and affect. His behavior is normal. Judgment and thought content normal.       Assessment:       1. BPH with urinary obstruction    2. Elevated PSA    3. Microscopic hematuria        Plan:         I spent 30 minutes with the patient of which more than half was spent in direct consultation with the patient in regards to our treatment and plan.    Education and recommendations of today's plan of care including home remedies.  We discussed his LUTS, microscopic hematuria, as well as the elevated PSA.  We reviewed the contributory factors for these.  Reassurance given.  For luts is diet modifications; reducing pm fluids; discussed medical  management with alpha blocker but he ok with how things are.  Microscopic hematuria presents very little risks with bladder cancer; especially when this only occurred once. Microscopic hematuria is different than visible/GROSS hematuria.  Discussed AUA guidelines in regards to workup of microscopic hematuria; need at least two specimens.  Urine today sent to lab for microscopic review as well as cytology.  We discussed elevated psa. Discussed his PSA does fall into a normal range of age adjusted PSA's. Exam revealed no nodules; no family history of prostate cancer.  We will repeat the PSA today with Prostate Health Index.   We discussed AUA guidelines with checking PSAs with someone his age.  He voiced understanding and appreciation

## 2019-06-14 LAB
-2 PROPSA (PHI): 11.1 PG/ML
FREE PSA (PHI): 1.1 NG/ML
PROSTATE HEALTH INDEX VALUE (PHI): 26.7
PSA FREE MFR SERPL: 16 %
PSA SERPL-MCNC: 7 NG/ML

## 2019-06-18 ENCOUNTER — TELEPHONE (OUTPATIENT)
Dept: UROLOGY | Facility: CLINIC | Age: 84
End: 2019-06-18

## 2019-06-18 DIAGNOSIS — R31.29 MICROSCOPIC HEMATURIA: ICD-10-CM

## 2019-06-18 DIAGNOSIS — R82.71 BACTERIA IN URINE: Primary | ICD-10-CM

## 2019-06-18 DIAGNOSIS — R97.20 ELEVATED PSA: ICD-10-CM

## 2019-06-18 RX ORDER — SULFAMETHOXAZOLE AND TRIMETHOPRIM 800; 160 MG/1; MG/1
1 TABLET ORAL 2 TIMES DAILY
Qty: 28 TABLET | Refills: 0 | Status: ON HOLD | OUTPATIENT
Start: 2019-06-18 | End: 2019-06-30 | Stop reason: HOSPADM

## 2019-06-18 NOTE — TELEPHONE ENCOUNTER
He was having microscopic hematuria; PSA elevated and repeat PSA went higher.  Urine showing bacteria and could be the cause.  Will treat and repeat before putting him through battery of tests.

## 2019-06-20 DIAGNOSIS — K21.9 GASTROESOPHAGEAL REFLUX DISEASE, ESOPHAGITIS PRESENCE NOT SPECIFIED: ICD-10-CM

## 2019-06-20 RX ORDER — OMEPRAZOLE 20 MG/1
CAPSULE, DELAYED RELEASE ORAL
Qty: 30 CAPSULE | Refills: 0 | Status: SHIPPED | OUTPATIENT
Start: 2019-06-20 | End: 2019-07-08

## 2019-06-21 ENCOUNTER — HOSPITAL ENCOUNTER (INPATIENT)
Facility: HOSPITAL | Age: 84
LOS: 9 days | Discharge: HOME HEALTH | DRG: 329 | End: 2019-06-30
Attending: EMERGENCY MEDICINE | Admitting: COLON & RECTAL SURGERY
Payer: MEDICARE

## 2019-06-21 ENCOUNTER — TELEPHONE (OUTPATIENT)
Dept: FAMILY MEDICINE | Facility: CLINIC | Age: 84
End: 2019-06-21

## 2019-06-21 ENCOUNTER — ANESTHESIA EVENT (OUTPATIENT)
Dept: SURGERY | Facility: HOSPITAL | Age: 84
DRG: 329 | End: 2019-06-21
Payer: MEDICARE

## 2019-06-21 ENCOUNTER — ANESTHESIA (OUTPATIENT)
Dept: SURGERY | Facility: HOSPITAL | Age: 84
DRG: 329 | End: 2019-06-21
Payer: MEDICARE

## 2019-06-21 DIAGNOSIS — Z46.59 ENCOUNTER FOR NASOGASTRIC TUBE PLACEMENT: ICD-10-CM

## 2019-06-21 DIAGNOSIS — K63.89 MASS OF COLON: ICD-10-CM

## 2019-06-21 DIAGNOSIS — K59.00 CONSTIPATION, UNSPECIFIED CONSTIPATION TYPE: ICD-10-CM

## 2019-06-21 DIAGNOSIS — K56.609 LARGE BOWEL OBSTRUCTION: Primary | ICD-10-CM

## 2019-06-21 DIAGNOSIS — K63.89 PNEUMATOSIS INTESTINALIS OF LARGE INTESTINE: ICD-10-CM

## 2019-06-21 LAB
ABO + RH BLD: NORMAL
ALBUMIN SERPL BCP-MCNC: 2.6 G/DL (ref 3.5–5.2)
ALBUMIN SERPL BCP-MCNC: 3.5 G/DL (ref 3.5–5.2)
ALP SERPL-CCNC: 36 U/L (ref 55–135)
ALP SERPL-CCNC: 45 U/L (ref 55–135)
ALT SERPL W/O P-5'-P-CCNC: 25 U/L (ref 10–44)
ALT SERPL W/O P-5'-P-CCNC: 28 U/L (ref 10–44)
ANION GAP SERPL CALC-SCNC: 10 MMOL/L (ref 8–16)
ANION GAP SERPL CALC-SCNC: 11 MMOL/L (ref 8–16)
AST SERPL-CCNC: 22 U/L (ref 10–40)
AST SERPL-CCNC: 31 U/L (ref 10–40)
BACTERIA #/AREA URNS AUTO: ABNORMAL /HPF
BASOPHILS # BLD AUTO: 0.02 K/UL (ref 0–0.2)
BASOPHILS # BLD AUTO: 0.02 K/UL (ref 0–0.2)
BASOPHILS NFR BLD: 0.2 % (ref 0–1.9)
BASOPHILS NFR BLD: 0.2 % (ref 0–1.9)
BILIRUB SERPL-MCNC: 0.3 MG/DL (ref 0.1–1)
BILIRUB SERPL-MCNC: 0.5 MG/DL (ref 0.1–1)
BILIRUB UR QL STRIP: NEGATIVE
BLD GP AB SCN CELLS X3 SERPL QL: NORMAL
BUN SERPL-MCNC: 10 MG/DL (ref 8–23)
BUN SERPL-MCNC: 10 MG/DL (ref 8–23)
CALCIUM SERPL-MCNC: 7.7 MG/DL (ref 8.7–10.5)
CALCIUM SERPL-MCNC: 9.4 MG/DL (ref 8.7–10.5)
CHLORIDE SERPL-SCNC: 102 MMOL/L (ref 95–110)
CHLORIDE SERPL-SCNC: 104 MMOL/L (ref 95–110)
CLARITY UR REFRACT.AUTO: ABNORMAL
CO2 SERPL-SCNC: 20 MMOL/L (ref 23–29)
CO2 SERPL-SCNC: 20 MMOL/L (ref 23–29)
COLOR UR AUTO: YELLOW
CREAT SERPL-MCNC: 0.6 MG/DL (ref 0.5–1.4)
CREAT SERPL-MCNC: 0.8 MG/DL (ref 0.5–1.4)
DIFFERENTIAL METHOD: ABNORMAL
DIFFERENTIAL METHOD: ABNORMAL
EOSINOPHIL # BLD AUTO: 0 K/UL (ref 0–0.5)
EOSINOPHIL # BLD AUTO: 0.1 K/UL (ref 0–0.5)
EOSINOPHIL NFR BLD: 0.2 % (ref 0–8)
EOSINOPHIL NFR BLD: 0.6 % (ref 0–8)
ERYTHROCYTE [DISTWIDTH] IN BLOOD BY AUTOMATED COUNT: 13.9 % (ref 11.5–14.5)
ERYTHROCYTE [DISTWIDTH] IN BLOOD BY AUTOMATED COUNT: 14.2 % (ref 11.5–14.5)
EST. GFR  (AFRICAN AMERICAN): >60 ML/MIN/1.73 M^2
EST. GFR  (AFRICAN AMERICAN): >60 ML/MIN/1.73 M^2
EST. GFR  (NON AFRICAN AMERICAN): >60 ML/MIN/1.73 M^2
EST. GFR  (NON AFRICAN AMERICAN): >60 ML/MIN/1.73 M^2
GLUCOSE SERPL-MCNC: 90 MG/DL (ref 70–110)
GLUCOSE SERPL-MCNC: 95 MG/DL (ref 70–110)
GLUCOSE UR QL STRIP: NEGATIVE
HCT VFR BLD AUTO: 33.6 % (ref 40–54)
HCT VFR BLD AUTO: 40 % (ref 40–54)
HGB BLD-MCNC: 11.2 G/DL (ref 14–18)
HGB BLD-MCNC: 13 G/DL (ref 14–18)
HGB UR QL STRIP: NEGATIVE
IMM GRANULOCYTES # BLD AUTO: 0.04 K/UL (ref 0–0.04)
IMM GRANULOCYTES # BLD AUTO: 0.04 K/UL (ref 0–0.04)
IMM GRANULOCYTES NFR BLD AUTO: 0.4 % (ref 0–0.5)
IMM GRANULOCYTES NFR BLD AUTO: 0.5 % (ref 0–0.5)
KETONES UR QL STRIP: ABNORMAL
LACTATE SERPL-SCNC: 0.8 MMOL/L (ref 0.5–2.2)
LEUKOCYTE ESTERASE UR QL STRIP: ABNORMAL
LIPASE SERPL-CCNC: 19 U/L (ref 4–60)
LYMPHOCYTES # BLD AUTO: 0.9 K/UL (ref 1–4.8)
LYMPHOCYTES # BLD AUTO: 1.6 K/UL (ref 1–4.8)
LYMPHOCYTES NFR BLD: 18.4 % (ref 18–48)
LYMPHOCYTES NFR BLD: 7.7 % (ref 18–48)
MCH RBC QN AUTO: 26.9 PG (ref 27–31)
MCH RBC QN AUTO: 27 PG (ref 27–31)
MCHC RBC AUTO-ENTMCNC: 32.5 G/DL (ref 32–36)
MCHC RBC AUTO-ENTMCNC: 33.3 G/DL (ref 32–36)
MCV RBC AUTO: 81 FL (ref 82–98)
MCV RBC AUTO: 83 FL (ref 82–98)
MICROSCOPIC COMMENT: ABNORMAL
MONOCYTES # BLD AUTO: 0.4 K/UL (ref 0.3–1)
MONOCYTES # BLD AUTO: 0.7 K/UL (ref 0.3–1)
MONOCYTES NFR BLD: 3.3 % (ref 4–15)
MONOCYTES NFR BLD: 7.9 % (ref 4–15)
NEUTROPHILS # BLD AUTO: 10 K/UL (ref 1.8–7.7)
NEUTROPHILS # BLD AUTO: 6.2 K/UL (ref 1.8–7.7)
NEUTROPHILS NFR BLD: 72.4 % (ref 38–73)
NEUTROPHILS NFR BLD: 88.2 % (ref 38–73)
NITRITE UR QL STRIP: NEGATIVE
NRBC BLD-RTO: 0 /100 WBC
NRBC BLD-RTO: 0 /100 WBC
PH UR STRIP: 6 [PH] (ref 5–8)
PLATELET # BLD AUTO: 204 K/UL (ref 150–350)
PLATELET # BLD AUTO: 225 K/UL (ref 150–350)
PMV BLD AUTO: 10.3 FL (ref 9.2–12.9)
PMV BLD AUTO: 11.1 FL (ref 9.2–12.9)
POTASSIUM SERPL-SCNC: 4.4 MMOL/L (ref 3.5–5.1)
POTASSIUM SERPL-SCNC: 4.9 MMOL/L (ref 3.5–5.1)
PROT SERPL-MCNC: 5.2 G/DL (ref 6–8.4)
PROT SERPL-MCNC: 7.5 G/DL (ref 6–8.4)
PROT UR QL STRIP: NEGATIVE
RBC # BLD AUTO: 4.16 M/UL (ref 4.6–6.2)
RBC # BLD AUTO: 4.82 M/UL (ref 4.6–6.2)
RBC #/AREA URNS AUTO: 2 /HPF (ref 0–4)
SODIUM SERPL-SCNC: 133 MMOL/L (ref 136–145)
SODIUM SERPL-SCNC: 134 MMOL/L (ref 136–145)
SP GR UR STRIP: 1.02 (ref 1–1.03)
SQUAMOUS #/AREA URNS AUTO: 0 /HPF
URN SPEC COLLECT METH UR: ABNORMAL
WBC # BLD AUTO: 11.37 K/UL (ref 3.9–12.7)
WBC # BLD AUTO: 8.5 K/UL (ref 3.9–12.7)
WBC #/AREA URNS AUTO: 5 /HPF (ref 0–5)

## 2019-06-21 PROCEDURE — 88342 IMHCHEM/IMCYTCHM 1ST ANTB: CPT | Mod: 26,,, | Performed by: PATHOLOGY

## 2019-06-21 PROCEDURE — 25500020 PHARM REV CODE 255: Performed by: EMERGENCY MEDICINE

## 2019-06-21 PROCEDURE — 88341 TISSUE SPECIMEN TO PATHOLOGY - SURGERY: ICD-10-PCS | Mod: 26,,, | Performed by: PATHOLOGY

## 2019-06-21 PROCEDURE — 25000003 PHARM REV CODE 250: Performed by: COLON & RECTAL SURGERY

## 2019-06-21 PROCEDURE — 81001 URINALYSIS AUTO W/SCOPE: CPT

## 2019-06-21 PROCEDURE — 86850 RBC ANTIBODY SCREEN: CPT

## 2019-06-21 PROCEDURE — D9220A PRA ANESTHESIA: ICD-10-PCS | Mod: CRNA,,, | Performed by: REGISTERED NURSE

## 2019-06-21 PROCEDURE — 83605 ASSAY OF LACTIC ACID: CPT

## 2019-06-21 PROCEDURE — 88307 TISSUE EXAM BY PATHOLOGIST: CPT | Mod: 26,,, | Performed by: PATHOLOGY

## 2019-06-21 PROCEDURE — D9220A PRA ANESTHESIA: ICD-10-PCS | Mod: ANES,,, | Performed by: ANESTHESIOLOGY

## 2019-06-21 PROCEDURE — 36000708 HC OR TIME LEV III 1ST 15 MIN: Performed by: COLON & RECTAL SURGERY

## 2019-06-21 PROCEDURE — 27201423 OPTIME MED/SURG SUP & DEVICES STERILE SUPPLY: Performed by: COLON & RECTAL SURGERY

## 2019-06-21 PROCEDURE — 63600175 PHARM REV CODE 636 W HCPCS: Performed by: STUDENT IN AN ORGANIZED HEALTH CARE EDUCATION/TRAINING PROGRAM

## 2019-06-21 PROCEDURE — 44150 REMOVAL OF COLON: CPT | Mod: ,,, | Performed by: COLON & RECTAL SURGERY

## 2019-06-21 PROCEDURE — 71000039 HC RECOVERY, EACH ADD'L HOUR: Performed by: COLON & RECTAL SURGERY

## 2019-06-21 PROCEDURE — 25000003 PHARM REV CODE 250: Performed by: STUDENT IN AN ORGANIZED HEALTH CARE EDUCATION/TRAINING PROGRAM

## 2019-06-21 PROCEDURE — 80053 COMPREHEN METABOLIC PANEL: CPT

## 2019-06-21 PROCEDURE — 88341 IMHCHEM/IMCYTCHM EA ADD ANTB: CPT | Performed by: PATHOLOGY

## 2019-06-21 PROCEDURE — 99285 EMERGENCY DEPT VISIT HI MDM: CPT | Mod: ,,, | Performed by: EMERGENCY MEDICINE

## 2019-06-21 PROCEDURE — 37000009 HC ANESTHESIA EA ADD 15 MINS: Performed by: COLON & RECTAL SURGERY

## 2019-06-21 PROCEDURE — 96374 THER/PROPH/DIAG INJ IV PUSH: CPT

## 2019-06-21 PROCEDURE — 88307 TISSUE SPECIMEN TO PATHOLOGY - SURGERY: ICD-10-PCS | Mod: 26,,, | Performed by: PATHOLOGY

## 2019-06-21 PROCEDURE — 25000003 PHARM REV CODE 250: Performed by: REGISTERED NURSE

## 2019-06-21 PROCEDURE — 37000008 HC ANESTHESIA 1ST 15 MINUTES: Performed by: COLON & RECTAL SURGERY

## 2019-06-21 PROCEDURE — 83690 ASSAY OF LIPASE: CPT

## 2019-06-21 PROCEDURE — 63600175 PHARM REV CODE 636 W HCPCS: Performed by: COLON & RECTAL SURGERY

## 2019-06-21 PROCEDURE — 96360 HYDRATION IV INFUSION INIT: CPT

## 2019-06-21 PROCEDURE — 20000000 HC ICU ROOM

## 2019-06-21 PROCEDURE — 88342 TISSUE SPECIMEN TO PATHOLOGY - SURGERY: ICD-10-PCS | Mod: 26,,, | Performed by: PATHOLOGY

## 2019-06-21 PROCEDURE — 96361 HYDRATE IV INFUSION ADD-ON: CPT

## 2019-06-21 PROCEDURE — 71000033 HC RECOVERY, INTIAL HOUR: Performed by: COLON & RECTAL SURGERY

## 2019-06-21 PROCEDURE — 80053 COMPREHEN METABOLIC PANEL: CPT | Mod: 91

## 2019-06-21 PROCEDURE — 99223 PR INITIAL HOSPITAL CARE,LEVL III: ICD-10-PCS | Mod: ,,, | Performed by: SURGERY

## 2019-06-21 PROCEDURE — 63600175 PHARM REV CODE 636 W HCPCS: Performed by: REGISTERED NURSE

## 2019-06-21 PROCEDURE — 44150 PR REMOVAL COLON/ILEOSTOMY: ICD-10-PCS | Mod: ,,, | Performed by: COLON & RECTAL SURGERY

## 2019-06-21 PROCEDURE — 36415 COLL VENOUS BLD VENIPUNCTURE: CPT

## 2019-06-21 PROCEDURE — S0030 INJECTION, METRONIDAZOLE: HCPCS | Performed by: REGISTERED NURSE

## 2019-06-21 PROCEDURE — C9290 INJ, BUPIVACAINE LIPOSOME: HCPCS | Performed by: COLON & RECTAL SURGERY

## 2019-06-21 PROCEDURE — 63600175 PHARM REV CODE 636 W HCPCS: Performed by: EMERGENCY MEDICINE

## 2019-06-21 PROCEDURE — 99223 1ST HOSP IP/OBS HIGH 75: CPT | Mod: ,,, | Performed by: SURGERY

## 2019-06-21 PROCEDURE — 99285 PR EMERGENCY DEPT VISIT,LEVEL V: ICD-10-PCS | Mod: ,,, | Performed by: EMERGENCY MEDICINE

## 2019-06-21 PROCEDURE — D9220A PRA ANESTHESIA: Mod: CRNA,,, | Performed by: REGISTERED NURSE

## 2019-06-21 PROCEDURE — 25000003 PHARM REV CODE 250: Performed by: EMERGENCY MEDICINE

## 2019-06-21 PROCEDURE — 36000709 HC OR TIME LEV III EA ADD 15 MIN: Performed by: COLON & RECTAL SURGERY

## 2019-06-21 PROCEDURE — D9220A PRA ANESTHESIA: Mod: ANES,,, | Performed by: ANESTHESIOLOGY

## 2019-06-21 PROCEDURE — 85025 COMPLETE CBC W/AUTO DIFF WBC: CPT

## 2019-06-21 PROCEDURE — 99285 EMERGENCY DEPT VISIT HI MDM: CPT | Mod: 25

## 2019-06-21 RX ORDER — SODIUM CHLORIDE 9 MG/ML
INJECTION, SOLUTION INTRAVENOUS CONTINUOUS
Status: DISCONTINUED | OUTPATIENT
Start: 2019-06-21 | End: 2019-06-21

## 2019-06-21 RX ORDER — OXYCODONE HYDROCHLORIDE 5 MG/1
5 TABLET ORAL EVERY 4 HOURS PRN
Status: DISCONTINUED | OUTPATIENT
Start: 2019-06-21 | End: 2019-06-30 | Stop reason: HOSPADM

## 2019-06-21 RX ORDER — MUPIROCIN 20 MG/G
1 OINTMENT TOPICAL 2 TIMES DAILY
Status: DISPENSED | OUTPATIENT
Start: 2019-06-21 | End: 2019-06-26

## 2019-06-21 RX ORDER — ACETAMINOPHEN 500 MG
1000 TABLET ORAL
Status: DISCONTINUED | OUTPATIENT
Start: 2019-06-21 | End: 2019-06-21

## 2019-06-21 RX ORDER — FENTANYL CITRATE 50 UG/ML
INJECTION, SOLUTION INTRAMUSCULAR; INTRAVENOUS
Status: DISCONTINUED | OUTPATIENT
Start: 2019-06-21 | End: 2019-06-21

## 2019-06-21 RX ORDER — MUPIROCIN 20 MG/G
OINTMENT TOPICAL
Status: DISCONTINUED | OUTPATIENT
Start: 2019-06-21 | End: 2019-06-21

## 2019-06-21 RX ORDER — HYDRALAZINE HYDROCHLORIDE 20 MG/ML
INJECTION INTRAMUSCULAR; INTRAVENOUS
Status: DISPENSED
Start: 2019-06-21 | End: 2019-06-22

## 2019-06-21 RX ORDER — GLYCOPYRROLATE 0.2 MG/ML
INJECTION INTRAMUSCULAR; INTRAVENOUS
Status: DISCONTINUED | OUTPATIENT
Start: 2019-06-21 | End: 2019-06-21

## 2019-06-21 RX ORDER — PROPOFOL 10 MG/ML
VIAL (ML) INTRAVENOUS
Status: DISCONTINUED | OUTPATIENT
Start: 2019-06-21 | End: 2019-06-21

## 2019-06-21 RX ORDER — DEXAMETHASONE SODIUM PHOSPHATE 4 MG/ML
INJECTION, SOLUTION INTRA-ARTICULAR; INTRALESIONAL; INTRAMUSCULAR; INTRAVENOUS; SOFT TISSUE
Status: DISCONTINUED | OUTPATIENT
Start: 2019-06-21 | End: 2019-06-21

## 2019-06-21 RX ORDER — ONDANSETRON 2 MG/ML
INJECTION INTRAMUSCULAR; INTRAVENOUS
Status: DISCONTINUED | OUTPATIENT
Start: 2019-06-21 | End: 2019-06-21

## 2019-06-21 RX ORDER — TAMSULOSIN HYDROCHLORIDE 0.4 MG/1
0.4 CAPSULE ORAL DAILY
Status: DISCONTINUED | OUTPATIENT
Start: 2019-06-22 | End: 2019-06-30 | Stop reason: HOSPADM

## 2019-06-21 RX ORDER — HYDROMORPHONE HYDROCHLORIDE 1 MG/ML
0.5 INJECTION, SOLUTION INTRAMUSCULAR; INTRAVENOUS; SUBCUTANEOUS
Status: DISCONTINUED | OUTPATIENT
Start: 2019-06-21 | End: 2019-06-30 | Stop reason: HOSPADM

## 2019-06-21 RX ORDER — NEOSTIGMINE METHYLSULFATE 1 MG/ML
INJECTION, SOLUTION INTRAVENOUS
Status: DISCONTINUED | OUTPATIENT
Start: 2019-06-21 | End: 2019-06-21

## 2019-06-21 RX ORDER — LABETALOL HYDROCHLORIDE 5 MG/ML
INJECTION, SOLUTION INTRAVENOUS
Status: DISCONTINUED | OUTPATIENT
Start: 2019-06-21 | End: 2019-06-21

## 2019-06-21 RX ORDER — SODIUM CHLORIDE 9 MG/ML
500 INJECTION, SOLUTION INTRAVENOUS
Status: COMPLETED | OUTPATIENT
Start: 2019-06-21 | End: 2019-06-21

## 2019-06-21 RX ORDER — ACETAMINOPHEN 10 MG/ML
1000 INJECTION, SOLUTION INTRAVENOUS EVERY 8 HOURS
Status: DISPENSED | OUTPATIENT
Start: 2019-06-21 | End: 2019-06-22

## 2019-06-21 RX ORDER — KETAMINE HCL IN 0.9 % NACL 50 MG/5 ML
SYRINGE (ML) INTRAVENOUS
Status: DISCONTINUED | OUTPATIENT
Start: 2019-06-21 | End: 2019-06-21

## 2019-06-21 RX ORDER — SODIUM CHLORIDE 9 MG/ML
1000 INJECTION, SOLUTION INTRAVENOUS CONTINUOUS
Status: DISCONTINUED | OUTPATIENT
Start: 2019-06-21 | End: 2019-06-21

## 2019-06-21 RX ORDER — METRONIDAZOLE 500 MG/100ML
500 INJECTION, SOLUTION INTRAVENOUS
Status: DISCONTINUED | OUTPATIENT
Start: 2019-06-21 | End: 2019-06-21

## 2019-06-21 RX ORDER — BUPIVACAINE HYDROCHLORIDE 2.5 MG/ML
INJECTION, SOLUTION EPIDURAL; INFILTRATION; INTRACAUDAL
Status: DISCONTINUED | OUTPATIENT
Start: 2019-06-21 | End: 2019-06-21 | Stop reason: HOSPADM

## 2019-06-21 RX ORDER — ENOXAPARIN SODIUM 100 MG/ML
40 INJECTION SUBCUTANEOUS DAILY
Status: DISCONTINUED | OUTPATIENT
Start: 2019-06-22 | End: 2019-06-30 | Stop reason: HOSPADM

## 2019-06-21 RX ORDER — SUCCINYLCHOLINE CHLORIDE 20 MG/ML
INJECTION INTRAMUSCULAR; INTRAVENOUS
Status: DISCONTINUED | OUTPATIENT
Start: 2019-06-21 | End: 2019-06-21

## 2019-06-21 RX ORDER — HYDRALAZINE HYDROCHLORIDE 20 MG/ML
5 INJECTION INTRAMUSCULAR; INTRAVENOUS
Status: COMPLETED | OUTPATIENT
Start: 2019-06-21 | End: 2019-06-21

## 2019-06-21 RX ORDER — ROCURONIUM BROMIDE 10 MG/ML
INJECTION, SOLUTION INTRAVENOUS
Status: DISCONTINUED | OUTPATIENT
Start: 2019-06-21 | End: 2019-06-21

## 2019-06-21 RX ORDER — SODIUM CHLORIDE, SODIUM LACTATE, POTASSIUM CHLORIDE, CALCIUM CHLORIDE 600; 310; 30; 20 MG/100ML; MG/100ML; MG/100ML; MG/100ML
INJECTION, SOLUTION INTRAVENOUS CONTINUOUS
Status: DISCONTINUED | OUTPATIENT
Start: 2019-06-21 | End: 2019-06-22

## 2019-06-21 RX ORDER — HEPARIN SODIUM 5000 [USP'U]/ML
5000 INJECTION, SOLUTION INTRAVENOUS; SUBCUTANEOUS
Status: DISCONTINUED | OUTPATIENT
Start: 2019-06-21 | End: 2019-06-21

## 2019-06-21 RX ORDER — ONDANSETRON 2 MG/ML
4 INJECTION INTRAMUSCULAR; INTRAVENOUS EVERY 12 HOURS PRN
Status: DISCONTINUED | OUTPATIENT
Start: 2019-06-21 | End: 2019-06-30 | Stop reason: HOSPADM

## 2019-06-21 RX ORDER — METRONIDAZOLE 500 MG/100ML
INJECTION, SOLUTION INTRAVENOUS
Status: DISCONTINUED | OUTPATIENT
Start: 2019-06-21 | End: 2019-06-21

## 2019-06-21 RX ORDER — NALOXONE HCL 0.4 MG/ML
0.02 VIAL (ML) INJECTION
Status: DISCONTINUED | OUTPATIENT
Start: 2019-06-21 | End: 2019-06-21

## 2019-06-21 RX ORDER — LIDOCAINE HCL/PF 100 MG/5ML
SYRINGE (ML) INTRAVENOUS
Status: DISCONTINUED | OUTPATIENT
Start: 2019-06-21 | End: 2019-06-21

## 2019-06-21 RX ADMIN — NEOSTIGMINE METHYLSULFATE 3 MG: 1 INJECTION INTRAVENOUS at 07:06

## 2019-06-21 RX ADMIN — SODIUM CHLORIDE 500 ML: 0.9 INJECTION, SOLUTION INTRAVENOUS at 11:06

## 2019-06-21 RX ADMIN — PROPOFOL 150 MG: 10 INJECTION, EMULSION INTRAVENOUS at 05:06

## 2019-06-21 RX ADMIN — IBUPROFEN 400 MG: 800 INJECTION INTRAVENOUS at 09:06

## 2019-06-21 RX ADMIN — Medication 10 MG: at 07:06

## 2019-06-21 RX ADMIN — FENTANYL CITRATE 25 MCG: 50 INJECTION, SOLUTION INTRAMUSCULAR; INTRAVENOUS at 07:06

## 2019-06-21 RX ADMIN — SODIUM CHLORIDE, SODIUM LACTATE, POTASSIUM CHLORIDE, AND CALCIUM CHLORIDE: .6; .31; .03; .02 INJECTION, SOLUTION INTRAVENOUS at 08:06

## 2019-06-21 RX ADMIN — ACETAMINOPHEN 1000 MG: 10 INJECTION, SOLUTION INTRAVENOUS at 09:06

## 2019-06-21 RX ADMIN — GLYCOPYRROLATE 0.4 MG: 0.2 INJECTION, SOLUTION INTRAMUSCULAR; INTRAVENOUS at 07:06

## 2019-06-21 RX ADMIN — IOHEXOL 75 ML: 350 INJECTION, SOLUTION INTRAVENOUS at 12:06

## 2019-06-21 RX ADMIN — ROCURONIUM BROMIDE 45 MG: 10 INJECTION, SOLUTION INTRAVENOUS at 06:06

## 2019-06-21 RX ADMIN — SODIUM CHLORIDE 1000 ML: 0.9 INJECTION, SOLUTION INTRAVENOUS at 04:06

## 2019-06-21 RX ADMIN — CEFTRIAXONE 2 G: 1 INJECTION, SOLUTION INTRAVENOUS at 06:06

## 2019-06-21 RX ADMIN — DEXAMETHASONE SODIUM PHOSPHATE 4 MG: 4 INJECTION, SOLUTION INTRAMUSCULAR; INTRAVENOUS at 05:06

## 2019-06-21 RX ADMIN — MUPIROCIN 1 G: 20 OINTMENT TOPICAL at 09:06

## 2019-06-21 RX ADMIN — ACETAMINOPHEN 1000 MG: 80 TABLET, CHEWABLE ORAL at 06:06

## 2019-06-21 RX ADMIN — FENTANYL CITRATE 100 MCG: 50 INJECTION, SOLUTION INTRAMUSCULAR; INTRAVENOUS at 05:06

## 2019-06-21 RX ADMIN — METRONIDAZOLE 500 MG: 500 SOLUTION INTRAVENOUS at 06:06

## 2019-06-21 RX ADMIN — SODIUM CHLORIDE 0.4 MCG/KG/MIN: 9 INJECTION, SOLUTION INTRAVENOUS at 06:06

## 2019-06-21 RX ADMIN — Medication 20 MG: at 06:06

## 2019-06-21 RX ADMIN — ONDANSETRON 4 MG: 2 INJECTION INTRAMUSCULAR; INTRAVENOUS at 07:06

## 2019-06-21 RX ADMIN — LABETALOL HYDROCHLORIDE 5 MG: 5 INJECTION, SOLUTION INTRAVENOUS at 07:06

## 2019-06-21 RX ADMIN — SODIUM CHLORIDE, SODIUM GLUCONATE, SODIUM ACETATE, POTASSIUM CHLORIDE, MAGNESIUM CHLORIDE, SODIUM PHOSPHATE, DIBASIC, AND POTASSIUM PHOSPHATE: .53; .5; .37; .037; .03; .012; .00082 INJECTION, SOLUTION INTRAVENOUS at 05:06

## 2019-06-21 RX ADMIN — LIDOCAINE HYDROCHLORIDE 100 MG: 20 INJECTION, SOLUTION INTRAVENOUS at 05:06

## 2019-06-21 RX ADMIN — Medication 10 MG: at 06:06

## 2019-06-21 RX ADMIN — ROCURONIUM BROMIDE 5 MG: 10 INJECTION, SOLUTION INTRAVENOUS at 05:06

## 2019-06-21 RX ADMIN — HYDRALAZINE HYDROCHLORIDE 5 MG: 20 INJECTION INTRAMUSCULAR; INTRAVENOUS at 04:06

## 2019-06-21 RX ADMIN — SODIUM CHLORIDE: 0.9 INJECTION, SOLUTION INTRAVENOUS at 07:06

## 2019-06-21 RX ADMIN — PROPOFOL 40 MG: 10 INJECTION, EMULSION INTRAVENOUS at 06:06

## 2019-06-21 RX ADMIN — SODIUM CHLORIDE, SODIUM GLUCONATE, SODIUM ACETATE, POTASSIUM CHLORIDE, MAGNESIUM CHLORIDE, SODIUM PHOSPHATE, DIBASIC, AND POTASSIUM PHOSPHATE: .53; .5; .37; .037; .03; .012; .00082 INJECTION, SOLUTION INTRAVENOUS at 06:06

## 2019-06-21 RX ADMIN — SUCCINYLCHOLINE CHLORIDE 100 MG: 20 INJECTION, SOLUTION INTRAMUSCULAR; INTRAVENOUS at 05:06

## 2019-06-21 NOTE — SUBJECTIVE & OBJECTIVE
(Not in a hospital admission)    Review of patient's allergies indicates:  No Known Allergies    History reviewed. No pertinent past medical history.  History reviewed. No pertinent surgical history.  Family History     Problem Relation (Age of Onset)    Cancer Brother    Diabetes Mother    No Known Problems Father, Sister        Tobacco Use    Smoking status: Never Smoker    Smokeless tobacco: Never Used   Substance and Sexual Activity    Alcohol use: Not Currently    Drug use: Not Currently    Sexual activity: Not on file     Review of Systems   Constitutional: Positive for appetite change and unexpected weight change. Negative for activity change.   HENT: Negative for congestion, dental problem, drooling and ear discharge.    Eyes: Negative for discharge, redness and itching.   Respiratory: Negative for apnea, choking and chest tightness.    Cardiovascular: Negative for chest pain and palpitations.   Gastrointestinal: Positive for abdominal pain, constipation, nausea and vomiting. Negative for abdominal distention, diarrhea and rectal pain.   Endocrine: Negative for cold intolerance and heat intolerance.   Genitourinary: Negative for difficulty urinating.   Skin: Negative for color change and rash.   Neurological: Negative for dizziness, light-headedness and headaches.   Psychiatric/Behavioral: Negative for agitation and behavioral problems.   All other systems reviewed and are negative.    Objective:     Vital Signs (Most Recent):  Temp: 98.4 °F (36.9 °C) (06/21/19 1350)  Pulse: 72 (06/21/19 1350)  Resp: 14 (06/21/19 1350)  BP: (!) 199/89 (06/21/19 1350)  SpO2: 99 % (06/21/19 1350) Vital Signs (24h Range):  Temp:  [98.4 °F (36.9 °C)-98.5 °F (36.9 °C)] 98.4 °F (36.9 °C)  Pulse:  [72-85] 72  Resp:  [14-18] 14  SpO2:  [99 %] 99 %  BP: (157-199)/(77-89) 199/89     Weight: 59.4 kg (131 lb)  Body mass index is 21.8 kg/m².    Physical Exam   Constitutional: He appears well-developed and well-nourished. No  distress.   HENT:   Head: Normocephalic and atraumatic.   Eyes: Pupils are equal, round, and reactive to light.   Neck: Normal range of motion. Neck supple.   Cardiovascular: Normal rate and regular rhythm.   Pulmonary/Chest: Effort normal. No respiratory distress.   Abdominal: Soft. He exhibits no distension and no mass. There is no tenderness. There is no guarding.   Skin: He is not diaphoretic.   Psychiatric: He has a normal mood and affect. His behavior is normal.     Significant Labs:  BMP (Last 3 Results):   Recent Labs   Lab 06/21/19  1122   GLU 90   *   K 4.9      CO2 20*   BUN 10   CREATININE 0.8   CALCIUM 9.4     CBC (Last 3 Results):   Recent Labs   Lab 06/21/19  1122   WBC 8.50   RBC 4.82   HGB 13.0*   HCT 40.0      MCV 83   MCH 27.0   MCHC 32.5       Significant Diagnostics:  CT: I have reviewed all pertinent results/findings within the past 24 hours:   Impression       Findings concerning for mechanical colon obstruction at the level of the sigmoid colon with associated pneumatosis intestinalis of the ascending and descending colon, as detailed above.  Underlying obstructing lesion cannot be excluded.    Soft tissue nodule in the left lung base.  For a solid nodule 6-8 mm, Fleischner Society 2017 guidelines recommend follow up with non-contrast chest CT at 6-12 months and 18-24 months after discovery.    Hypodense adrenal nodules bilaterally, indeterminate on today's exam.    Hypodense nodule in the left kidney, too small to characterize.

## 2019-06-21 NOTE — NURSING
Ostomy consult for pre-op marking.     Discussed ostomy with patient and son. Discussed where and what ileostomy entails. Patient and son asked appropriate questions but do seem slightly overhwelmed. Encouragement given that should patient get ostomy we will see patient frequently and educate patient and family on care.     Patient marked to the right upper quadrant within the rectus ab muscle. Patient marked above the belly button and the RLQ seems to fall on belt line.     Ostomy to follow as needed for teaching should patient get new stoma.     Jessy Sepulveda RN [facility]   [phone]

## 2019-06-21 NOTE — ED PROVIDER NOTES
"Encounter Date: 6/21/2019    SCRIBE #1 NOTE: I, Sophia Gomes, am scribing for, and in the presence of,  Dr. Ashley. I have scribed the following portions of the note - Other sections scribed: HPI, ROS, PE.       History     Chief Complaint   Patient presents with    Abdominal Pain     very nauseated, not eating , no bm in 2 weeks     Time patient was seen by the provider: 10:50 AM      The patient is a 88 y.o. male with no significant past medical history who presents to the ED with a complaint of worsening lower abdominal pain and constipation for the past 1.5 weeks. Patient states that he has been intermittently constipated for the past month. So, he recently established a PCP for his symptoms earlier this month which was where he was prescribed stool softeners and told to increase fiber intake. He states that it gave him relief, however, his symptoms continues to occur intermittently. He also reports of associated decreased appetite, abdominal bloating and gurgling.  He states that he "just does not have urge to go," and has lost 60 pounds in the last year. He also reports nausea and vomiting mucus-like contents. He denies fever, chills, cough, shortness of breath, or burning with urination. Denies history of colonoscopy. A ten point review of systems was completed and is negative except as documented above. Patient denies any other acute medical complaint. The patient's available PMH, PSH, social history, medications, allergies, and triage vital signs were reviewed just prior to their medical evaluation.      The history is provided by the patient, medical records and a relative.     Review of patient's allergies indicates:  No Known Allergies  History reviewed. No pertinent past medical history.  History reviewed. No pertinent surgical history.  Family History   Problem Relation Age of Onset    Diabetes Mother     No Known Problems Father     No Known Problems Sister     Cancer Brother      Social History "     Tobacco Use    Smoking status: Never Smoker    Smokeless tobacco: Never Used   Substance Use Topics    Alcohol use: Not Currently    Drug use: Not Currently     Review of Systems   Constitutional: Positive for appetite change (decreased) and unexpected weight change. Negative for fever.   HENT: Negative for sore throat.    Eyes: Negative for visual disturbance.   Respiratory: Negative for cough and shortness of breath.    Cardiovascular: Negative for chest pain.   Gastrointestinal: Positive for abdominal pain, constipation, nausea and vomiting (mucus-like contents). Negative for diarrhea.   Genitourinary: Negative for dysuria.   Musculoskeletal: Negative for neck pain.   Skin: Negative for rash and wound.   Allergic/Immunologic: Negative for immunocompromised state.   Neurological: Negative for syncope.   Psychiatric/Behavioral: Negative for confusion.   All other systems reviewed and are negative.      Physical Exam     Initial Vitals [06/21/19 0958]   BP Pulse Resp Temp SpO2   (!) 157/77 85 18 98.5 °F (36.9 °C) 99 %      MAP       --         Physical Exam    Nursing note and vitals reviewed.  Constitutional: He appears well-developed and well-nourished. He is not diaphoretic. No distress.   HENT:   Head: Normocephalic and atraumatic.   Nose: Nose normal.   Eyes: Conjunctivae are normal. Right eye exhibits no discharge. Left eye exhibits no discharge.   Neck: Normal range of motion. Neck supple.   Cardiovascular: Normal rate, regular rhythm, normal heart sounds and intact distal pulses. Exam reveals no gallop and no friction rub.    No murmur heard.  Pulmonary/Chest: Breath sounds normal. No respiratory distress. He has no wheezes. He has no rhonchi. He has no rales.   Abdominal: Soft. He exhibits no distension. There is tenderness. There is no rebound and no guarding.   LLQ ttp   Musculoskeletal: Normal range of motion. He exhibits no edema or tenderness.   Neurological: He is alert and oriented to person,  place, and time. GCS score is 15. GCS eye subscore is 4. GCS verbal subscore is 5. GCS motor subscore is 6.   Skin: Skin is warm and dry. No rash noted. No erythema.   Psychiatric: He has a normal mood and affect. His behavior is normal. Judgment and thought content normal.         ED Course   Procedures  Labs Reviewed   CBC W/ AUTO DIFFERENTIAL - Abnormal; Notable for the following components:       Result Value    Hemoglobin 13.0 (*)     All other components within normal limits   COMPREHENSIVE METABOLIC PANEL - Abnormal; Notable for the following components:    Sodium 133 (*)     CO2 20 (*)     Alkaline Phosphatase 45 (*)     All other components within normal limits   URINALYSIS, REFLEX TO URINE CULTURE - Abnormal; Notable for the following components:    Appearance, UA Cloudy (*)     Ketones, UA 1+ (*)     Leukocytes, UA Trace (*)     All other components within normal limits    Narrative:     Preferred Collection Type->Urine, Clean Catch   URINALYSIS MICROSCOPIC - Abnormal; Notable for the following components:    Bacteria Many (*)     All other components within normal limits    Narrative:     Preferred Collection Type->Urine, Clean Catch   LIPASE   LACTIC ACID, PLASMA   TYPE & SCREEN          Imaging Results           CT Abdomen Pelvis With Contrast (Final result)  Result time 06/21/19 14:10:24    Final result by Sarah Vences MD (06/21/19 14:10:24)                 Impression:      Findings concerning for mechanical colon obstruction at the level of the sigmoid colon with associated pneumatosis intestinalis of the ascending and descending colon, as detailed above.  Underlying obstructing lesion cannot be excluded.    Soft tissue nodule in the left lung base.  For a solid nodule 6-8 mm, Fleischner Society 2017 guidelines recommend follow up with non-contrast chest CT at 6-12 months and 18-24 months after discovery.    Hypodense adrenal nodules bilaterally, indeterminate on today's exam.    Hypodense  nodule in the left kidney, too small to characterize.    COMMUNICATION  This critical result was discovered/received at 01:30.  The critical information above was relayed directly by Dr. Adkins By telephone to Dr. Ashley on 06/21/2019 at 13:45.    This report was flagged in Epic as abnormal.    Electronically signed by resident: Richa Adkins  Date:    06/21/2019  Time:    13:22    Electronically signed by: Sarah Vences MD  Date:    06/21/2019  Time:    14:10             Narrative:    EXAMINATION:  CT ABDOMEN PELVIS WITH CONTRAST    CLINICAL HISTORY:  Weight loss, unintended, non-localized abd pain;    TECHNIQUE:  Low dose axial images, sagittal and coronal reformations were obtained from the lung bases to the pubic symphysis following the IV administration of 75 mL of Omnipaque 350.  Delayed images through the abdomen pelvis were obtained.  Oral contrast was not given.    COMPARISON:  None.    FINDINGS:  Lung bases:Bibasilar bandlike opacity, suggestive of subsegmental atelectasis.  0.8 cm nodule in the left lower lobe (series 2, image 13.  Calcified nodule in the left lower lobe (series 6 image 6).  No pleural effusion.    Heart/pericardium: Heart is normal in size.  No pericardial effusion.  Coronary artery atherosclerotic calcification.  Aortic annulus calcification.    Abdominal wall: Unremarkable.    Liver: Normal in size and contour.  No focal lesion.    Gallbladder/bile ducts: No radiopaque gallstone.  No intra or extrahepatic biliary ductal dilatation.    Spleen and pancreas are unremarkable.    Adrenal glands: Bilateral hypodense nodules, measuring 0.9 cm on the left and 1 cm on the right.    Kidneys/ureters: Normal in size and location.  Subcentimeter hypodensities, too small to characterize, in the left kidney.  No hydronephrosis or ureteral dilatation.    Urinary bladder: Partially distended with smooth contour.    Reproductive: Prostate gland is enlarged measuring 5.9 cm with dystrophic  calcification.    Bowel/mesentery: Increase stool burden in the ascending, transverse and descending colon with associated moderate colonic distension up to the level of sigmoid colon where there is an area of narrowing (series 2, image 70 and series 4 image 25).  Note is made of intraluminal high density material at this level measuring 0.8 cm, likely representing ingested material.  There are additional intraluminal high density in the ascending colon, likely ingested material as well.  There is intramural air in the ascending and descending colon (series 2, image 49 and series 2 image 68).  The distal small bowel loops are mildly distended as well, with a maximum diameter of 3.3 cm.    Peritoneum/retroperitoneum: Trace amount of free fluid in the pelvis.  No free intraperitoneal air.    Vasculature: Aortoiliac atherosclerotic calcification.  Aorta is normal in course and caliber without aneurysmal dilatation.  Celiac, superior and inferior mesenteric arteries are patent.  Portal, splenic and superior mesenteric veins are patent.    Bones: Degenerative changes of the spine and vacuum phenomena at L4-L5 and L5-S1.  No suspicious lytic or blastic lesion.  No acute fracture.                                 Medical Decision Making:   History:   I obtained history from: someone other than patient.  Old Medical Records: I decided to obtain old medical records.  Clinical Tests:   Lab Tests: Ordered and Reviewed  Radiological Study: Ordered and Reviewed  ED Management:  88-year-old male with a one-month history of constipation and a several month history of weight loss presents with left lower quadrant abdominal pain.  Vitals unremarkable. Physical exam as above.  History concerning for new colon cancer.  Belly labs unremarkable. CT shows sigmoid colon mass with obstruction and pneumatosis.  Discussed with Colorectal surgery who evaluated at bedside.  Ordered NG tube.  They will take to surgery.  Did bedside teaching.   All questions answered.  Patient acknowledges understanding.  Other:   I have discussed this case with another health care provider.  CRS, radiology            Scribe Attestation:   Scribe #1: I performed the above scribed service and the documentation accurately describes the services I performed. I attest to the accuracy of the note.               Clinical Impression:       ICD-10-CM ICD-9-CM   1. Large bowel obstruction K56.609 560.9   2. Mass of colon K63.9 569.89   3. Pneumatosis intestinalis of large intestine K63.89 569.89   4. Constipation, unspecified constipation type K59.00 564.00         Disposition:   Disposition: Admitted  Condition: Fair         Level of Complexity:  High, level 5.               Yandel Ashley MD  06/21/19 3646

## 2019-06-21 NOTE — ASSESSMENT & PLAN NOTE
Cale Anthony  is a 88 y.o. male with LBO secondary to sigmoid colon mass with cecal pneumatosis on CT scan    Class B to OR for total colectomy/end ileostomy  Risks/benefits explained to patient and son, all questions answered  Patient marked by enterostomal for ileostomy  NPO/NGT/IVF  Preoperative antibiotics and preoperative orders in    Kale Kuo

## 2019-06-21 NOTE — ED TRIAGE NOTES
Pt reports abdominal pain x30 days. Pt reports no bowel movement x2 weeks. Pt states he passes gas.

## 2019-06-21 NOTE — ED NOTES
Pt urgently hypertensive.  Dr Ashley made aware; states he will order medication to address pt's blood pressure.

## 2019-06-21 NOTE — ED NOTES
Patient identifiers verified and correct for Cale Anthony  LOC: The patient is awake, alert and aware of environment with an appropriate affect, the patient is oriented x 3 and speaking appropriately.   APPEARANCE: Patient appears comfortable and in no acute distress, patient is clean and well groomed.  SKIN: The skin is warm and dry, color consistent with ethnicity, patient has normal skin turgor and moist mucus membranes, skin intact, no breakdown or bruising noted.   MUSCULOSKELETAL: Patient moving all extremities spontaneously, no swelling noted.  RESPIRATORY: Airway is open and patent, respirations are spontaneous, patient has a normal effort and rate, no accessory muscle use noted, pt placed on continuous pulse ox with O2 sats noted at 97% on room air.  CARDIAC: Pt placed on cardiac monitor. Patient has a normal rate and regular rhythm, no edema noted, capillary refill < 3 seconds.   GASTRO: Soft and non tender to palpation, distention noted, normoactive bowel sounds present in all four quadrants. Pt states no bowel movement x2 weeks  : Pt denies any pain or frequency with urination.  NEURO: Pt opens eyes spontaneously, behavior appropriate to situation, follows commands, facial expression symmetrical, bilateral hand grasp equal and even, purposeful motor response noted, normal sensation in all extremities when touched with a finger.

## 2019-06-21 NOTE — PROGRESS NOTES
Urology Progress Note    Called to OR for difficulty placing Jauregui catheter. 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 12 Fr silicone catheter placed easily with return of clear yellow urine. Jauregui catheter per primary team.     Please call with questions.    Teo Marlow MD  [facility]  [phone]

## 2019-06-21 NOTE — TELEPHONE ENCOUNTER
"----- Message from Susie Limon sent at 6/21/2019  9:28 AM CDT -----  Contact: willie Brady" [phone]  .Type: Patient Call Back    Who called: Willie Brady"    What is the request in detail: Still having some complications regarding not having a appetite and no bowel movement in 2 weeks.     Can the clinic reply by [facility]? Call back     Would the patient rather a call back or a response via [facility]? Call back     Best call back number: [phone]        "

## 2019-06-21 NOTE — H&P
"[facility]  Colorectal Surgery  History & Physical    Patient Name: Cale Anthony Sr.  MRN: [id number]  Admission Date: 6/21/2019  Attending Physician: Yandel Ashley MD   Primary Care Provider: Mal Santoyo MD    Subjective:     Chief Complaint/Reason for Admission: large bowel obstruction    History of Present Illness:  Cale Anthony Sr. is a 88 y.o. male with no known medical history (hasn't seen a doctor for most of his life) who presents with progressive anorexia, inability to tolerate PO with N/V, and constipation. He established care with a PCP about a month ago and was tried on various bowel regimens without relief. He now presents with about 10 days without a bowel movement although he continues to pass flatus. Some abdominal cramping but no "pain." Has lost ~60 pounds in the past year. Never had a colonoscopy. Family history significant for colon cancer in a brother at age ~80.      (Not in a hospital admission)    Review of patient's allergies indicates:  No Known Allergies    History reviewed. No pertinent past medical history.  History reviewed. No pertinent surgical history.  Family History     Problem Relation (Age of Onset)    Cancer Brother    Diabetes Mother    No Known Problems Father, Sister        Tobacco Use    Smoking status: Never Smoker    Smokeless tobacco: Never Used   Substance and Sexual Activity    Alcohol use: Not Currently    Drug use: Not Currently    Sexual activity: Not on file     Review of Systems   Constitutional: Positive for appetite change and unexpected weight change. Negative for activity change.   HENT: Negative for congestion, dental problem, drooling and ear discharge.    Eyes: Negative for discharge, redness and itching.   Respiratory: Negative for apnea, choking and chest tightness.    Cardiovascular: Negative for chest pain and palpitations.   Gastrointestinal: Positive for abdominal pain, constipation, nausea and vomiting. Negative " for abdominal distention, diarrhea and rectal pain.   Endocrine: Negative for cold intolerance and heat intolerance.   Genitourinary: Negative for difficulty urinating.   Skin: Negative for color change and rash.   Neurological: Negative for dizziness, light-headedness and headaches.   Psychiatric/Behavioral: Negative for agitation and behavioral problems.   All other systems reviewed and are negative.    Objective:     Vital Signs (Most Recent):  Temp: 98.4 °F (36.9 °C) (06/21/19 1350)  Pulse: 72 (06/21/19 1350)  Resp: 14 (06/21/19 1350)  BP: (!) 199/89 (06/21/19 1350)  SpO2: 99 % (06/21/19 1350) Vital Signs (24h Range):  Temp:  [98.4 °F (36.9 °C)-98.5 °F (36.9 °C)] 98.4 °F (36.9 °C)  Pulse:  [72-85] 72  Resp:  [14-18] 14  SpO2:  [99 %] 99 %  BP: (157-199)/(77-89) 199/89     Weight: 59.4 kg (131 lb)  Body mass index is 21.8 kg/m².    Physical Exam   Constitutional: He appears well-developed and well-nourished. No distress.   HENT:   Head: Normocephalic and atraumatic.   Eyes: Pupils are equal, round, and reactive to light.   Neck: Normal range of motion. Neck supple.   Cardiovascular: Normal rate and regular rhythm.   Pulmonary/Chest: Effort normal. No respiratory distress.   Abdominal: Soft. He exhibits no distension and no mass. There is no tenderness. There is no guarding.   Skin: He is not diaphoretic.   Psychiatric: He has a normal mood and affect. His behavior is normal.     Significant Labs:  BMP (Last 3 Results):   Recent Labs   Lab 06/21/19  1122   GLU 90   *   K 4.9      CO2 20*   BUN 10   CREATININE 0.8   CALCIUM 9.4     CBC (Last 3 Results):   Recent Labs   Lab 06/21/19  1122   WBC 8.50   RBC 4.82   HGB 13.0*   HCT 40.0      MCV 83   MCH 27.0   MCHC 32.5       Significant Diagnostics:  CT: I have reviewed all pertinent results/findings within the past 24 hours:   Impression       Findings concerning for mechanical colon obstruction at the level of the sigmoid colon with associated  pneumatosis intestinalis of the ascending and descending colon, as detailed above.  Underlying obstructing lesion cannot be excluded.    Soft tissue nodule in the left lung base.  For a solid nodule 6-8 mm, Fleischner Society 2017 guidelines recommend follow up with non-contrast chest CT at 6-12 months and 18-24 months after discovery.    Hypodense adrenal nodules bilaterally, indeterminate on today's exam.    Hypodense nodule in the left kidney, too small to characterize.         Assessment/Plan:     * Large bowel obstruction  Cale Anthony Sr. is a 88 y.o. male with LBO secondary to sigmoid colon mass with cecal pneumatosis on CT scan    Class B to OR for total colectomy/end ileostomy  Risks/benefits explained to patient and son, all questions answered  Patient marked by enterostomal for ileostomy  NPO/NGT/IVF  Preoperative antibiotics and preoperative orders in    Kale Kuo MD  Colorectal Surgery  [facility]

## 2019-06-21 NOTE — ANESTHESIA PREPROCEDURE EVALUATION
06/21/2019  [facility]  Anesthesia Pre-Operative Evaluation         Patient Name: Cale Anthony Sr.  YOB: 1930  MRN: [id number]    SUBJECTIVE:     Pre-operative evaluation for Procedure(s) (LRB):  COLECTOMY, TOTAL, ABDOMINAL with end ileostomy (N/A)     06/21/2019    Cale Anthony Sr. is a 88 y.o. male w/ a significant PMHx of large bowel obstruction who presents for the above procedure.      LDA:   Left forearm 22g PIV  Right forearm 20g PIV  Right nostril 18 FR NG    Prev airway: None    Drips: None documented.    Patient Active Problem List   Diagnosis    Gastroesophageal reflux disease    Constipation    Large bowel obstruction       Review of patient's allergies indicates:  No Known Allergies    Current Inpatient Medications:      No current facility-administered medications on file prior to encounter.      Current Outpatient Medications on File Prior to Encounter   Medication Sig Dispense Refill    docusate sodium (COLACE) 100 MG capsule Take 1 capsule (100 mg total) by mouth 2 (two) times daily as needed for Constipation. 60 capsule 3    sulfamethoxazole-trimethoprim 800-160mg (BACTRIM DS) 800-160 mg Tab Take 1 tablet by mouth 2 (two) times daily. for 14 days 28 tablet 0    ergocalciferol (ERGOCALCIFEROL) 50,000 unit Cap Take 1 capsule (50,000 Units total) by mouth every 7 days. 12 capsule 3    omeprazole (PRILOSEC) 20 MG capsule TAKE 1 CAPSULE BY MOUTH EVERY DAY AS NEEDED 30 capsule 0    omeprazole 20 mg TbEC Take 1 tablet by mouth daily as needed. 30 each 2       History reviewed. No pertinent surgical history.    Social History     Socioeconomic History    Marital status: Single     Spouse name: Not on file    Number of children: Not on file    Years of education: Not on file    Highest education level: Not on file   Occupational History    Not on  file   Social Needs    Financial resource strain: Not on file    Food insecurity:     Worry: Not on file     Inability: Not on file    Transportation needs:     Medical: Not on file     Non-medical: Not on file   Tobacco Use    Smoking status: Never Smoker    Smokeless tobacco: Never Used   Substance and Sexual Activity    Alcohol use: Not Currently    Drug use: Not Currently    Sexual activity: Not on file   Lifestyle    Physical activity:     Days per week: Not on file     Minutes per session: Not on file    Stress: Not on file   Relationships    Social connections:     Talks on phone: Not on file     Gets together: Not on file     Attends Buddhist service: Not on file     Active member of club or organization: Not on file     Attends meetings of clubs or organizations: Not on file     Relationship status: Not on file   Other Topics Concern    Not on file   Social History Narrative    Not on file       OBJECTIVE:     Vital Signs Range (Last 24H):  Temp:  [36.9 °C (98.4 °F)-36.9 °C (98.5 °F)]   Pulse:  [72-85]   Resp:  [14-18]   BP: (157-199)/(77-89)   SpO2:  [99 %]       CBC:   Recent Labs     06/21/19  1122   WBC 8.50   RBC 4.82   HGB 13.0*   HCT 40.0      MCV 83   MCH 27.0   MCHC 32.5       CMP:   Recent Labs     06/21/19  1122   *   K 4.9      CO2 20*   BUN 10   CREATININE 0.8   GLU 90   CALCIUM 9.4   ALBUMIN 3.5   PROT 7.5   ALKPHOS 45*   ALT 28   AST 31   BILITOT 0.5       INR:  No results for input(s): PT, INR, PROTIME, APTT in the last 72 hours.    Diagnostic Studies: No relevant studies.    EKG: No recent studies available.    2D ECHO:  No results found for this or any previous visit.      ASSESSMENT/PLAN:         Anesthesia Evaluation    I have reviewed the Patient Summary Reports.    I have reviewed the Nursing Notes.   I have reviewed the Medications.     Review of Systems  Anesthesia Hx:  Denies Hx of Anesthetic complications  Neg history of prior surgery. Denies Family  Hx of Anesthesia complications.    Social:  Non-Smoker    Hematology/Oncology:  Hematology Normal   Oncology Normal     EENT/Dental:EENT/Dental Normal   Cardiovascular:  Cardiovascular Normal Exercise tolerance: good     Pulmonary:  Pulmonary Normal  Denies COPD.  Denies Asthma.    Renal/:  Renal/ Normal  Denies Chronic Renal Disease.     Hepatic/GI:   Denies GERD.    Neurological:  Neurology Normal    Endocrine:  Endocrine Normal Denies Diabetes.        Physical Exam  General:  Well nourished    Airway/Jaw/Neck:  Airway Findings: Mouth Opening: Normal Tongue: Normal  General Airway Assessment: Adult  Mallampati: I  TM Distance: Normal, at least 6 cm      Dental:  Dental Findings: Periodontal disease, Severe   Chest/Lungs:  Chest/Lungs Clear    Heart/Vascular:  Heart Findings: Normal    Abdomen:  Abdomen Findings:  Distention       Mental Status:  Mental Status Findings:  Cooperative         Anesthesia Plan  Type of Anesthesia, risks & benefits discussed:  Anesthesia Type:  general  Patient's Preference:   Intra-op Monitoring Plan: standard ASA monitors  Intra-op Monitoring Plan Comments:   Post Op Pain Control Plan: multimodal analgesia  Post Op Pain Control Plan Comments:   Induction:   IV  Beta Blocker:  Patient is not currently on a Beta-Blocker (No further documentation required).       Informed Consent: Patient understands risks and agrees with Anesthesia plan.  Questions answered. Anesthesia consent signed with patient.  ASA Score: 3  emergent   Day of Surgery Review of History & Physical:    H&P update referred to the surgeon.         Ready For Surgery From Anesthesia Perspective.

## 2019-06-21 NOTE — TELEPHONE ENCOUNTER
Spoke with patient's son Willie and he just wanted provider to know that he will be taking patient to the ED. Nothing has changed with patient bowels with medication and patient appetite is still  and he has nausea.

## 2019-06-21 NOTE — HPI
"Cale Anthony Sr. is a 88 y.o. male with no known medical history (hasn't seen a doctor for most of his life) who presents with progressive anorexia, inability to tolerate PO with N/V, and constipation. He established care with a PCP about a month ago and was tried on various bowel regimens without relief. He now presents with about 10 days without a bowel movement although he continues to pass flatus. Some abdominal cramping but no "pain." Has lost ~60 pounds in the past year. Never had a colonoscopy. Family history significant for colon cancer in a brother at age ~80.  "

## 2019-06-22 LAB
ANION GAP SERPL CALC-SCNC: 11 MMOL/L (ref 8–16)
BASOPHILS # BLD AUTO: 0.02 K/UL (ref 0–0.2)
BASOPHILS NFR BLD: 0.2 % (ref 0–1.9)
BUN SERPL-MCNC: 9 MG/DL (ref 8–23)
CALCIUM SERPL-MCNC: 8.3 MG/DL (ref 8.7–10.5)
CHLORIDE SERPL-SCNC: 103 MMOL/L (ref 95–110)
CO2 SERPL-SCNC: 19 MMOL/L (ref 23–29)
CREAT SERPL-MCNC: 0.6 MG/DL (ref 0.5–1.4)
DIFFERENTIAL METHOD: ABNORMAL
EOSINOPHIL # BLD AUTO: 0 K/UL (ref 0–0.5)
EOSINOPHIL NFR BLD: 0 % (ref 0–8)
ERYTHROCYTE [DISTWIDTH] IN BLOOD BY AUTOMATED COUNT: 14.1 % (ref 11.5–14.5)
EST. GFR  (AFRICAN AMERICAN): >60 ML/MIN/1.73 M^2
EST. GFR  (NON AFRICAN AMERICAN): >60 ML/MIN/1.73 M^2
GLUCOSE SERPL-MCNC: 109 MG/DL (ref 70–110)
GLUCOSE SERPL-MCNC: 96 MG/DL (ref 70–110)
HCO3 UR-SCNC: 21.5 MMOL/L (ref 24–28)
HCT VFR BLD AUTO: 35.5 % (ref 40–54)
HCT VFR BLD CALC: 34 %PCV (ref 36–54)
HGB BLD-MCNC: 11.8 G/DL (ref 14–18)
IMM GRANULOCYTES # BLD AUTO: 0.05 K/UL (ref 0–0.04)
IMM GRANULOCYTES NFR BLD AUTO: 0.4 % (ref 0–0.5)
LYMPHOCYTES # BLD AUTO: 1.1 K/UL (ref 1–4.8)
LYMPHOCYTES NFR BLD: 8.4 % (ref 18–48)
MAGNESIUM SERPL-MCNC: 1.7 MG/DL (ref 1.6–2.6)
MCH RBC QN AUTO: 27.1 PG (ref 27–31)
MCHC RBC AUTO-ENTMCNC: 33.2 G/DL (ref 32–36)
MCV RBC AUTO: 81 FL (ref 82–98)
MONOCYTES # BLD AUTO: 0.7 K/UL (ref 0.3–1)
MONOCYTES NFR BLD: 5.1 % (ref 4–15)
NEUTROPHILS # BLD AUTO: 11 K/UL (ref 1.8–7.7)
NEUTROPHILS NFR BLD: 85.9 % (ref 38–73)
NRBC BLD-RTO: 0 /100 WBC
PCO2 BLDA: 39.3 MMHG (ref 35–45)
PH SMN: 7.34 [PH] (ref 7.35–7.45)
PHOSPHATE SERPL-MCNC: 3.7 MG/DL (ref 2.7–4.5)
PLATELET # BLD AUTO: 226 K/UL (ref 150–350)
PMV BLD AUTO: 10.6 FL (ref 9.2–12.9)
PO2 BLDA: 422 MMHG (ref 80–100)
POC BE: -4 MMOL/L
POC IONIZED CALCIUM: 1.07 MMOL/L (ref 1.06–1.42)
POC SATURATED O2: 100 % (ref 95–100)
POC TCO2: 23 MMOL/L (ref 23–27)
POTASSIUM BLD-SCNC: 4 MMOL/L (ref 3.5–5.1)
POTASSIUM SERPL-SCNC: 4 MMOL/L (ref 3.5–5.1)
RBC # BLD AUTO: 4.36 M/UL (ref 4.6–6.2)
SAMPLE: ABNORMAL
SODIUM BLD-SCNC: 135 MMOL/L (ref 136–145)
SODIUM SERPL-SCNC: 133 MMOL/L (ref 136–145)
WBC # BLD AUTO: 12.77 K/UL (ref 3.9–12.7)

## 2019-06-22 PROCEDURE — 80048 BASIC METABOLIC PNL TOTAL CA: CPT

## 2019-06-22 PROCEDURE — 99233 SBSQ HOSP IP/OBS HIGH 50: CPT | Mod: ,,, | Performed by: SURGERY

## 2019-06-22 PROCEDURE — 85025 COMPLETE CBC W/AUTO DIFF WBC: CPT

## 2019-06-22 PROCEDURE — 20600001 HC STEP DOWN PRIVATE ROOM

## 2019-06-22 PROCEDURE — 63600175 PHARM REV CODE 636 W HCPCS: Performed by: STUDENT IN AN ORGANIZED HEALTH CARE EDUCATION/TRAINING PROGRAM

## 2019-06-22 PROCEDURE — S0028 INJECTION, FAMOTIDINE, 20 MG: HCPCS | Performed by: STUDENT IN AN ORGANIZED HEALTH CARE EDUCATION/TRAINING PROGRAM

## 2019-06-22 PROCEDURE — 84100 ASSAY OF PHOSPHORUS: CPT

## 2019-06-22 PROCEDURE — 25000003 PHARM REV CODE 250: Performed by: STUDENT IN AN ORGANIZED HEALTH CARE EDUCATION/TRAINING PROGRAM

## 2019-06-22 PROCEDURE — 94761 N-INVAS EAR/PLS OXIMETRY MLT: CPT

## 2019-06-22 PROCEDURE — 83735 ASSAY OF MAGNESIUM: CPT

## 2019-06-22 PROCEDURE — 99233 PR SUBSEQUENT HOSPITAL CARE,LEVL III: ICD-10-PCS | Mod: ,,, | Performed by: SURGERY

## 2019-06-22 RX ORDER — MAGNESIUM SULFATE HEPTAHYDRATE 40 MG/ML
2 INJECTION, SOLUTION INTRAVENOUS ONCE
Status: COMPLETED | OUTPATIENT
Start: 2019-06-22 | End: 2019-06-22

## 2019-06-22 RX ORDER — FAMOTIDINE 10 MG/ML
20 INJECTION INTRAVENOUS 2 TIMES DAILY
Status: DISCONTINUED | OUTPATIENT
Start: 2019-06-22 | End: 2019-06-30 | Stop reason: HOSPADM

## 2019-06-22 RX ORDER — MAGNESIUM SULFATE HEPTAHYDRATE 40 MG/ML
4 INJECTION, SOLUTION INTRAVENOUS
Status: DISCONTINUED | OUTPATIENT
Start: 2019-06-22 | End: 2019-06-24

## 2019-06-22 RX ORDER — MAGNESIUM SULFATE HEPTAHYDRATE 40 MG/ML
2 INJECTION, SOLUTION INTRAVENOUS
Status: DISCONTINUED | OUTPATIENT
Start: 2019-06-22 | End: 2019-06-24

## 2019-06-22 RX ORDER — DEXTROSE MONOHYDRATE, SODIUM CHLORIDE, AND POTASSIUM CHLORIDE 50; 1.49; 4.5 G/1000ML; G/1000ML; G/1000ML
INJECTION, SOLUTION INTRAVENOUS CONTINUOUS
Status: DISCONTINUED | OUTPATIENT
Start: 2019-06-22 | End: 2019-06-23

## 2019-06-22 RX ADMIN — FAMOTIDINE 20 MG: 10 INJECTION, SOLUTION INTRAVENOUS at 11:06

## 2019-06-22 RX ADMIN — ENOXAPARIN SODIUM 40 MG: 100 INJECTION SUBCUTANEOUS at 09:06

## 2019-06-22 RX ADMIN — TAMSULOSIN HYDROCHLORIDE 0.4 MG: 0.4 CAPSULE ORAL at 09:06

## 2019-06-22 RX ADMIN — DEXTROSE MONOHYDRATE, SODIUM CHLORIDE, AND POTASSIUM CHLORIDE: 50; 4.5; 1.49 INJECTION, SOLUTION INTRAVENOUS at 11:06

## 2019-06-22 RX ADMIN — MAGNESIUM SULFATE IN WATER 2 G: 40 INJECTION, SOLUTION INTRAVENOUS at 06:06

## 2019-06-22 RX ADMIN — MUPIROCIN 1 G: 20 OINTMENT TOPICAL at 09:06

## 2019-06-22 RX ADMIN — OXYCODONE HYDROCHLORIDE 5 MG: 5 TABLET ORAL at 11:06

## 2019-06-22 RX ADMIN — ACETAMINOPHEN 1000 MG: 10 INJECTION, SOLUTION INTRAVENOUS at 06:06

## 2019-06-22 NOTE — SUBJECTIVE & OBJECTIVE
Subjective:     Interval History: NAEO, pain well controlled, +ostomy OP    Post-Op Info:  Procedure(s) (LRB):  COLECTOMY, TOTAL, ABDOMINAL with end ileostomy (N/A)   1 Day Post-Op      Medications:  Continuous Infusions:   dextrose 5 % and 0.45 % NaCl with KCl 20 mEq       Scheduled Meds:   acetaminophen  1,000 mg Intravenous Q8H    enoxaparin  40 mg Subcutaneous Daily    mupirocin  1 g Nasal BID    tamsulosin  0.4 mg Oral Daily     PRN Meds:   HYDROmorphone    ibuprofen    ondansetron    oxyCODONE        Objective:     Vital Signs (Most Recent):  Temp: 97.9 °F (36.6 °C) (06/22/19 0700)  Pulse: 63 (06/22/19 1000)  Resp: (!) 27 (06/22/19 1000)  BP: 121/71 (06/22/19 1000)  SpO2: 99 % (06/22/19 1000) Vital Signs (24h Range):  Temp:  [97 °F (36.1 °C)-98.4 °F (36.9 °C)] 97.9 °F (36.6 °C)  Pulse:  [55-80] 63  Resp:  [10-27] 27  SpO2:  [95 %-100 %] 99 %  BP: (111-227)/() 121/71  Arterial Line BP: ()/(43-93) 107/66     Intake/Output - Last 3 Shifts       06/20 0700 - 06/21 0659 06/21 0700 - 06/22 0659 06/22 0700 - 06/23 0659    I.V. (mL/kg)  3663.3 (61.7)     Total Intake(mL/kg)  3663.3 (61.7)     Urine (mL/kg/hr)  1075 165 (0.7)    Stool  330 50    Blood  100     Total Output  1505 215    Net  +2158.3 -215                 Physical Exam   Constitutional: He is oriented to person, place, and time. No distress.   Eyes: EOM are normal.   Neck: Neck supple.   Cardiovascular: Normal rate and regular rhythm.   Pulmonary/Chest: Effort normal. No respiratory distress.   Abdominal: Soft.   Incision c/d/i, ileostomy in place, pink/viable, stool in bag   Musculoskeletal: Normal range of motion.   Neurological: He is alert and oriented to person, place, and time.   Skin: Skin is warm and dry.       Significant Labs:  BMP (Last 3 Results):   Recent Labs   Lab 06/21/19  1122 06/21/19  1941 06/22/19  0323   GLU 90 95 96   * 134* 133*   K 4.9 4.4 4.0    104 103   CO2 20* 20* 19*   BUN 10 10 9    CREATININE 0.8 0.6 0.6   CALCIUM 9.4 7.7* 8.3*   MG  --   --  1.7     CBC (Last 3 Results):   Recent Labs   Lab 06/21/19  1122 06/21/19  1854 06/21/19  1941 06/22/19  0323   WBC 8.50  --  11.37 12.77*   RBC 4.82  --  4.16* 4.36*   HGB 13.0*  --  11.2* 11.8*   HCT 40.0 34* 33.6* 35.5*     --  204 226   MCV 83  --  81* 81*   MCH 27.0  --  26.9* 27.1   MCHC 32.5  --  33.3 33.2       Significant Diagnostics:  I have reviewed all pertinent imaging results/findings within the past 24 hours.

## 2019-06-22 NOTE — NURSING
MD made aware of patient slightly bradycardic (high 50s HR) while sleeping. No orders given at this time. Will continue to monitor.

## 2019-06-22 NOTE — ASSESSMENT & PLAN NOTE
Cale Anthony . is a 88 y.o. male with LBO secondary to sigmoid colon mass with cecal pneumatosis on CT scan s/p exlap, TAC w/ EI on 6/21/19    -d/c NGT  -ok for sips  -MIVFs  -cont dallas until 6/24 per urology for ureteral stricture  -WOCN for new ileostomy  -OOB, PT, IS, ok for lovenox    Appreciate ICU care, will plan to txf to floor today    Lobito Mccloud

## 2019-06-22 NOTE — BRIEF OP NOTE
[facility]  Brief Operative Note    SUMMARY     Surgery Date: 6/21/2019     Surgeon(s) and Role:     * Dino Rodríguez MD - Primary     * Teo Marlow MD - Assisting     * Lobito Mccloud MD - Fellow        Pre-Operative Care:  Pre-operative bowel prep Mechanical and PO antibiotics  IV antibiotics given within 1 hour of incision? Yes  Preoperative multimodal pain control? Orfirmev, Calador and TAP    Pre-op Diagnosis:  Large bowel obstruction [K56.609]    Operative Care:  Post-op Diagnosis: Post-Op Diagnosis Codes:     * Large bowel obstruction [K56.609]    Procedure(s) (LRB):  COLECTOMY, TOTAL, ABDOMINAL with end ileostomy (N/A)    Anesthesia: General  Total volume administered .   Total UOP: .  Anesthesia Start: .  Anesthesia Stop: .    Technical Procedures Used:   1. Exploratory laparotomy  2. Total colectomy, end ileostomy    Use of closing instrument setup? No  Gown/Glove Change @ time of closing? N/A  Suction tip change at time of closing? N/A  Wound Protector used if open case? N/A    Description of the findings of the procedure:   1. Rectosigmoid mass with upstream dilation, cecum >10cm with ischemic changes     Wound Class (Clean-contaminated    Complications: no    Estimated Blood Loss: 100 mL           Specimens:   Specimen (12h ago, onward)    Start     Ordered    06/21/19 1919  Specimen to Pathology - Surgery  Once     Comments:  1. Colon - Permanent     Start Status     06/21/19 1919 Collected (06/21/19 1924) Order ID: [id number]       06/21/19 1919          Implants: * No implants in log *    Post-Operative Care:         Disposition: PACU - hemodynamically stable.           Condition: Stable  PT Temp >36 upon leaving the OR? Yes

## 2019-06-22 NOTE — NURSING
Report received from PACU RN. Pt transported to SICU [id number] with portable telemetry. Pt connected to ICU monitor. SICU team called and made aware of patient arrival. New orders received and implemented. Pt assessed, immediate needs met. Family brought to bedside, updated on the patient's current condition and PoC for remainder of shift. All questions answered, emotional support provided.     NG tube placed to LIWS per order. Orders given to keep SBP <180 per Dr. Luo.    Admit Skin Note: Ostomy stoma red and moist. 5 cc bowel sweat drained from ostomy. No skin breakdown to sacral region and heels. Foam dressings applied to both to prevent skin breakdown.

## 2019-06-22 NOTE — PROGRESS NOTES
[facility]  Critical Care - Surgery  Progress Note    Patient Name: Cale Anthony Sr.  MRN: [id number]  Admission Date: 6/21/2019  Hospital Length of Stay: 1 days  Code Status: No Order  Attending Provider: Dino Rodríguez MD  Primary Care Provider: Mal Santoyo MD   Principal Problem: Large bowel obstruction    Subjective:     Hospital/ICU Course:  No notes on file    Interval History/Significant Events: No acute events overnight. Pain controlled. No nausea or vomiting. Afebrile, vital signs stable.    Follow-up For: Procedure(s) (LRB):  COLECTOMY, TOTAL, ABDOMINAL with end ileostomy (N/A)    Post-Operative Day: 1 Day Post-Op    Objective:     Vital Signs (Most Recent):  Temp: 97.9 °F (36.6 °C) (06/22/19 0700)  Pulse: (!) 57 (06/22/19 0745)  Resp: 20 (06/22/19 0745)  BP: 122/60 (06/22/19 0730)  SpO2: 99 % (06/22/19 0745) Vital Signs (24h Range):  Temp:  [97 °F (36.1 °C)-98.5 °F (36.9 °C)] 97.9 °F (36.6 °C)  Pulse:  [55-85] 57  Resp:  [10-24] 20  SpO2:  [95 %-100 %] 99 %  BP: (112-227)/() 122/60  Arterial Line BP: (121-176)/(43-93) 126/53     Weight: 59.4 kg (131 lb)  Body mass index is 21.8 kg/m².      Intake/Output Summary (Last 24 hours) at 6/22/2019 0803  Last data filed at 6/22/2019 0700  Gross per 24 hour   Intake 3663.33 ml   Output 1595 ml   Net 2068.33 ml       Physical Exam   Constitutional: He is oriented to person, place, and time. He appears well-developed and well-nourished. No distress.   HENT:   Head: Normocephalic and atraumatic.   Eyes: No scleral icterus.   Cardiovascular: Normal rate and regular rhythm.   Pulmonary/Chest: Effort normal. No respiratory distress.   Abdominal: Soft. He exhibits no distension.   Midline bandage with scant sanguinous drainage  RLQ ileostomy pink and healthy appearing, light brown liquid stool in bag   Neurological: He is alert and oriented to person, place, and time.   Skin: Skin is warm and dry.   Psychiatric: He has a normal mood and  affect. His behavior is normal. Judgment and thought content normal.   Nursing note and vitals reviewed.      Vents:       Lines/Drains/Airways     Drain                 Urethral Catheter 06/21/19 Non-latex 12 Fr. 1 day         Ileostomy 06/21/19 1735 other (see comments) less than 1 day         NG/OG Tube 06/21/19 1630 Anne sump 18 Fr. Right nostril less than 1 day          Peripheral Intravenous Line                 Peripheral IV - Single Lumen 06/21/19 1122 22 G Left Forearm less than 1 day         Peripheral IV - Single Lumen 06/21/19 1545 20 G Right Forearm less than 1 day                Significant Labs:    CBC/Anemia Profile:  Recent Labs   Lab 06/21/19 1122 06/21/19  1854 06/21/19 1941 06/22/19  0323   WBC 8.50  --  11.37 12.77*   HGB 13.0*  --  11.2* 11.8*   HCT 40.0 34* 33.6* 35.5*     --  204 226   MCV 83  --  81* 81*   RDW 14.2  --  13.9 14.1        Chemistries:  Recent Labs   Lab 06/21/19 1122 06/21/19 1941 06/22/19  0323   * 134* 133*   K 4.9 4.4 4.0    104 103   CO2 20* 20* 19*   BUN 10 10 9   CREATININE 0.8 0.6 0.6   CALCIUM 9.4 7.7* 8.3*   ALBUMIN 3.5 2.6*  --    PROT 7.5 5.2*  --    BILITOT 0.5 0.3  --    ALKPHOS 45* 36*  --    ALT 28 25  --    AST 31 22  --    MG  --   --  1.7   PHOS  --   --  3.7       All pertinent labs within the past 24 hours have been reviewed.    Significant Imaging:  I have reviewed all pertinent imaging results/findings within the past 24 hours.    Assessment/Plan:     * Large bowel obstruction  Cale Anthony Sr. is a 88 y.o. male with no known medical history (hasn't seen a doctor for most of his life) who presents with progressive anorexia, inability to tolerate PO with N/V, and constipation. 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 Patient underwent emergent total abdominal colectomy with end ileostomy on  6/21/19. Rectosigmoid mass with upstream dilation was noted, and the cecum was >10cm with ischemic changes.    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 [place] management per primary team.  -Monitor UOP   -Bun/Cr 9/0.6, stable    Fluids/Electrolytes/Nutrition/GI:   -Nutritional status: NPO  -replace lytes PRN  -maintenance fluids @ 100 cc/hr  -NG tube to LIWS    Hematology/Oncology:  -H/H 11.8/35.5, stable  -Monitor H/H  -Prophylactic lovenox    Infectious Disease:   -Afebrile  -WBC 12.8 from 11.4 overnight  -Monitor WBC    Endocrine:  -Monitor BG  -Glucose goal of 140-180    F: NPO  A: scheduled tylenol; PRN oxy, ibuprofen, dilaudid  S: N/A  T: Lovenox  H: 30 degrees  U: N/A  G: Monitor BG; Goal 140-180    Dispo:  -Stepdown per primary  -Primary: Colorectal      Critical care was time spent personally by me on the following activities: development of treatment plan with patient or surrogate and bedside caregivers, discussions with consultants, evaluation of patient's response to treatment, examination of patient, ordering and performing treatments and interventions, ordering and review of laboratory studies, ordering and review of radiographic studies, pulse oximetry, re-evaluation of patient's condition.  This critical care time did not overlap with that of any other provider or involve time for any procedures.     Keith Luo MD  Critical Care - Surgery  [facility]

## 2019-06-22 NOTE — NURSING TRANSFER
Nursing Transfer Note      6/22/2019     Transfer From: SICU    Transfer via wheelchair    Transfer with cardiac monitoring    Transported by CHAD Malhotra    Medicines sent: none    Chart send with patient: Yes    Notified: daughter, son    Patient reassessed at: 06/21/19 1520    Upon arrival to floor: cardiac monitor applied, patient oriented to room, call bell in reach and bed in lowest position

## 2019-06-22 NOTE — NURSING TRANSFER
Nursing Transfer Note      6/22/2019     Transfer To: [place] [id number]    Transfer via wheelchair    Transfer with cardiac monitoring    Transported by CHAD Malhotra    Medicines sent: N/A    Chart send with patient: Yes    Notified: son    Patient reassessed at: 6/22/19, 1500     Upon arrival to floor: cardiac monitor applied, patient oriented to room, call bell in reach and bed in lowest position    CHAD Singer at bedside upon transfer. Pt tolerated transfer well.

## 2019-06-22 NOTE — NURSING
MD made aware of patient's NG tube pulled out some and advanced back into position at 70 cm. Air bubble heard on auscultation and orders for KUB placed. Will continue to monitor.

## 2019-06-22 NOTE — SUBJECTIVE & OBJECTIVE
Follow-up For: Procedure(s) (LRB):  COLECTOMY, TOTAL, ABDOMINAL with end ileostomy (N/A)    Post-Operative Day: Day of Surgery     History reviewed. No pertinent past medical history.    History reviewed. No pertinent surgical history.    Review of patient's allergies indicates:  No Known Allergies    Family History     Problem Relation (Age of Onset)    Cancer Brother    Diabetes Mother    No Known Problems Father, Sister        Tobacco Use    Smoking status: Never Smoker    Smokeless tobacco: Never Used   Substance and Sexual Activity    Alcohol use: Not Currently    Drug use: Not Currently    Sexual activity: Not on file      Review of Systems   Unable to perform ROS: Acuity of condition     Objective:     Vital Signs (Most Recent):  Temp: 97.3 °F (36.3 °C) (06/21/19 2030)  Pulse: 63 (06/21/19 2100)  Resp: 13 (06/21/19 2100)  BP: (!) 159/66 (06/21/19 2100)  SpO2: 96 % (06/21/19 2100) Vital Signs (24h Range):  Temp:  [97 °F (36.1 °C)-98.5 °F (36.9 °C)] 97.3 °F (36.3 °C)  Pulse:  [62-85] 63  Resp:  [12-20] 13  SpO2:  [95 %-100 %] 96 %  BP: (136-227)/(66-96) 159/66  Arterial Line BP: (154-176)/(72-88) 157/88     Weight: 59.4 kg (131 lb)  Body mass index is 21.8 kg/m².      Intake/Output Summary (Last 24 hours) at 6/21/2019 2148  Last data filed at 6/21/2019 2040  Gross per 24 hour   Intake 2633.33 ml   Output 550 ml   Net 2083.33 ml       Physical Exam   Constitutional: He is oriented to person, place, and time. He appears well-developed and well-nourished. No distress.   HENT:   Head: Normocephalic and atraumatic.   Eyes: No scleral icterus.   Cardiovascular: Normal rate and regular rhythm.   Pulmonary/Chest: Effort normal. No respiratory distress.   Abdominal: Soft. He exhibits no distension. There is tenderness. There is no guarding.   Midline bandage with scant sanguinous drainage  RLQ ileostomy pink and healthy appearing   Neurological: He is alert and oriented to person, place, and time.   Skin: Skin is  warm and dry.   Psychiatric: He has a normal mood and affect. His behavior is normal. Judgment and thought content normal.   Nursing note and vitals reviewed.      Vents:       Lines/Drains/Airways     Drain                 Ileostomy 06/21/19 1735 other (see comments) less than 1 day         NG/OG Tube 06/21/19 1630 [place] sump 18 Fr. Right nostril less than 1 day         Urethral Catheter 06/21/19 Non-latex 12 Fr. less than 1 day          Peripheral Intravenous Line                 Peripheral IV - Single Lumen 06/21/19 1122 22 G Left Forearm less than 1 day         Peripheral IV - Single Lumen 06/21/19 1545 20 G Right Forearm less than 1 day                Significant Labs:    CBC/Anemia Profile:  Recent Labs   Lab 06/21/19 1122 06/21/19  1941   WBC 8.50 11.37   HGB 13.0* 11.2*   HCT 40.0 33.6*    204   MCV 83 81*   RDW 14.2 13.9        Chemistries:  Recent Labs   Lab 06/21/19 1122 06/21/19 1941   * 134*   K 4.9 4.4    104   CO2 20* 20*   BUN 10 10   CREATININE 0.8 0.6   CALCIUM 9.4 7.7*   ALBUMIN 3.5 2.6*   PROT 7.5 5.2*   BILITOT 0.5 0.3   ALKPHOS 45* 36*   ALT 28 25   AST 31 22       All pertinent labs within the past 24 hours have been reviewed.    Significant Imaging: I have reviewed all pertinent imaging results/findings within the past 24 hours.

## 2019-06-22 NOTE — PLAN OF CARE
Problem: Infection  Goal: Infection Symptom Resolution  Outcome: Ongoing (interventions implemented as appropriate)  .    Problem: Fall Injury Risk  Goal: Absence of Fall and Fall-Related Injury  Outcome: Ongoing (interventions implemented as appropriate)  .    Problem: Skin Injury Risk Increased  Goal: Skin Health and Integrity  Outcome: Ongoing (interventions implemented as appropriate)  .

## 2019-06-22 NOTE — PROGRESS NOTES
[facility]  Colorectal Surgery  Progress Note    Patient Name: Cale Anthony Sr.  MRN: [id number]  Admission Date: 6/21/2019  Hospital Length of Stay: 1 days  Attending Physician: Dino Rodríguez MD    Subjective:     Interval History: NAEO, pain well controlled, +ostomy OP    Post-Op Info:  Procedure(s) (LRB):  COLECTOMY, TOTAL, ABDOMINAL with end ileostomy (N/A)   1 Day Post-Op      Medications:  Continuous Infusions:   dextrose 5 % and 0.45 % NaCl with KCl 20 mEq       Scheduled Meds:   acetaminophen  1,000 mg Intravenous Q8H    enoxaparin  40 mg Subcutaneous Daily    mupirocin  1 g Nasal BID    tamsulosin  0.4 mg Oral Daily     PRN Meds:   HYDROmorphone    ibuprofen    ondansetron    oxyCODONE        Objective:     Vital Signs (Most Recent):  Temp: 97.9 °F (36.6 °C) (06/22/19 0700)  Pulse: 63 (06/22/19 1000)  Resp: (!) 27 (06/22/19 1000)  BP: 121/71 (06/22/19 1000)  SpO2: 99 % (06/22/19 1000) Vital Signs (24h Range):  Temp:  [97 °F (36.1 °C)-98.4 °F (36.9 °C)] 97.9 °F (36.6 °C)  Pulse:  [55-80] 63  Resp:  [10-27] 27  SpO2:  [95 %-100 %] 99 %  BP: (111-227)/() 121/71  Arterial Line BP: ()/(43-93) 107/66     Intake/Output - Last 3 Shifts       06/20 0700 - 06/21 0659 06/21 0700 - 06/22 0659 06/22 0700 - 06/23 0659    I.V. (mL/kg)  3663.3 (61.7)     Total Intake(mL/kg)  3663.3 (61.7)     Urine (mL/kg/hr)  1075 165 (0.7)    Stool  330 50    Blood  100     Total Output  1505 215    Net  +2158.3 -215                 Physical Exam   Constitutional: He is oriented to person, place, and time. No distress.   Eyes: EOM are normal.   Neck: Neck supple.   Cardiovascular: Normal rate and regular rhythm.   Pulmonary/Chest: Effort normal. No respiratory distress.   Abdominal: Soft.   Incision c/d/i, ileostomy in place, pink/viable, stool in bag   Musculoskeletal: Normal range of motion.   Neurological: He is alert and oriented to person, place, and time.   Skin: Skin is warm and dry.        Significant Labs:  BMP (Last 3 Results):   Recent Labs   Lab 06/21/19  1122 06/21/19  1941 06/22/19  0323   GLU 90 95 96   * 134* 133*   K 4.9 4.4 4.0    104 103   CO2 20* 20* 19*   BUN 10 10 9   CREATININE 0.8 0.6 0.6   CALCIUM 9.4 7.7* 8.3*   MG  --   --  1.7     CBC (Last 3 Results):   Recent Labs   Lab 06/21/19  1122 06/21/19  1854 06/21/19 1941 06/22/19  0323   WBC 8.50  --  11.37 12.77*   RBC 4.82  --  4.16* 4.36*   HGB 13.0*  --  11.2* 11.8*   HCT 40.0 34* 33.6* 35.5*     --  204 226   MCV 83  --  81* 81*   MCH 27.0  --  26.9* 27.1   MCHC 32.5  --  33.3 33.2       Significant Diagnostics:  I have reviewed all pertinent imaging results/findings within the past 24 hours.    Assessment/Plan:     * Large bowel obstruction  Cale Anthony Sr. is a 88 y.o. male with LBO secondary to sigmoid colon mass with cecal pneumatosis on CT scan s/p exlap, TAC w/ EI on 6/21/19    -d/c NGT  -ok for sips  -MIVFs  -cont [place] until 6/24 per urology for ureteral stricture  -WOCN for new ileostomy  -OOB, PT, IS, ok for lovenox    Appreciate ICU care, will plan to txf to floor today    Lobito Mccloud MD  Colorectal Surgery  [facility]

## 2019-06-22 NOTE — SUBJECTIVE & OBJECTIVE
Interval History/Significant Events: No acute events overnight. Pain controlled. No nausea or vomiting. Afebrile, vital signs stable.    Follow-up For: Procedure(s) (LRB):  COLECTOMY, TOTAL, ABDOMINAL with end ileostomy (N/A)    Post-Operative Day: 1 Day Post-Op    Objective:     Vital Signs (Most Recent):  Temp: 97.9 °F (36.6 °C) (06/22/19 0700)  Pulse: (!) 57 (06/22/19 0745)  Resp: 20 (06/22/19 0745)  BP: 122/60 (06/22/19 0730)  SpO2: 99 % (06/22/19 0745) Vital Signs (24h Range):  Temp:  [97 °F (36.1 °C)-98.5 °F (36.9 °C)] 97.9 °F (36.6 °C)  Pulse:  [55-85] 57  Resp:  [10-24] 20  SpO2:  [95 %-100 %] 99 %  BP: (112-227)/() 122/60  Arterial Line BP: (121-176)/(43-93) 126/53     Weight: 59.4 kg (131 lb)  Body mass index is 21.8 kg/m².      Intake/Output Summary (Last 24 hours) at 6/22/2019 0803  Last data filed at 6/22/2019 0700  Gross per 24 hour   Intake 3663.33 ml   Output 1595 ml   Net 2068.33 ml       Physical Exam   Constitutional: He is oriented to person, place, and time. He appears well-developed and well-nourished. No distress.   HENT:   Head: Normocephalic and atraumatic.   Eyes: No scleral icterus.   Cardiovascular: Normal rate and regular rhythm.   Pulmonary/Chest: Effort normal. No respiratory distress.   Abdominal: Soft. He exhibits no distension.   Midline bandage with scant sanguinous drainage  RLQ ileostomy pink and healthy appearing, light brown liquid stool in bag   Neurological: He is alert and oriented to person, place, and time.   Skin: Skin is warm and dry.   Psychiatric: He has a normal mood and affect. His behavior is normal. Judgment and thought content normal.   Nursing note and vitals reviewed.      Vents:       Lines/Drains/Airways     Drain                 Urethral Catheter 06/21/19 Non-latex 12 Fr. 1 day         Ileostomy 06/21/19 1735 other (see comments) less than 1 day         NG/OG Tube 06/21/19 1630 Anne singh 18 Fr. Right nostril less than 1 day          Peripheral  Intravenous Line                 Peripheral IV - Single Lumen 06/21/19 1122 22 G Left Forearm less than 1 day         Peripheral IV - Single Lumen 06/21/19 1545 20 G Right Forearm less than 1 day                Significant Labs:    CBC/Anemia Profile:  Recent Labs   Lab 06/21/19 1122 06/21/19  1854 06/21/19 1941 06/22/19  0323   WBC 8.50  --  11.37 12.77*   HGB 13.0*  --  11.2* 11.8*   HCT 40.0 34* 33.6* 35.5*     --  204 226   MCV 83  --  81* 81*   RDW 14.2  --  13.9 14.1        Chemistries:  Recent Labs   Lab 06/21/19 1122 06/21/19 1941 06/22/19  0323   * 134* 133*   K 4.9 4.4 4.0    104 103   CO2 20* 20* 19*   BUN 10 10 9   CREATININE 0.8 0.6 0.6   CALCIUM 9.4 7.7* 8.3*   ALBUMIN 3.5 2.6*  --    PROT 7.5 5.2*  --    BILITOT 0.5 0.3  --    ALKPHOS 45* 36*  --    ALT 28 25  --    AST 31 22  --    MG  --   --  1.7   PHOS  --   --  3.7       All pertinent labs within the past 24 hours have been reviewed.    Significant Imaging:  I have reviewed all pertinent imaging results/findings within the past 24 hours.

## 2019-06-22 NOTE — PLAN OF CARE
Problem: Adult Inpatient Plan of Care  Goal: Plan of Care Review  POC reviewed with patient and family. AAOX4. VSS. Tele monitor in place, NSR 70's. NG to IDRIS mont[place] to LIWS with small output. ML incision with telfa island dressing intact. RLQ ileostomy intact. Jauregui in place draining dev urine. SCD's placed. NPO diet maintained. No complaints of nausea or pain. IVF infusing. Call light within reach,. WCTM.

## 2019-06-22 NOTE — ASSESSMENT & PLAN NOTE
Cale Anthony Sr. is a 88 y.o. male with no known medical history (hasn't seen a doctor for most of his life) who presents with progressive anorexia, inability to tolerate PO with N/V, and constipation. 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 Patient underwent emergent total abdominal colectomy with end ileostomy on 6/21/19. Rectosigmoid mass with upstream dilation was noted, and the cecum was >10cm with ischemic changes.    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 [place] management per primary team.  -Monitor UOP   -Bun/Cr 9/0.6, stable    Fluids/Electrolytes/Nutrition/GI:   -Nutritional status: NPO  -replace lytes PRN  -maintenance fluids @ 100 cc/hr  -NG tube to LIWS    Hematology/Oncology:  -H/H 11.8/35.5, stable  -Monitor H/H  -Prophylactic lovenox    Infectious Disease:   -Afebrile  -WBC 12.8 from 11.4 overnight  -Monitor WBC    Endocrine:  -Monitor BG  -Glucose goal of 140-180    F: NPO  A: scheduled tylenol; PRN oxy, ibuprofen, dilaudid  S: N/A  T: Lovenox  H: 30 degrees  U: N/A  G: Monitor BG; Goal 140-180    Dispo:  -Stepdown per primary  -Primary: Colorectal

## 2019-06-22 NOTE — NURSING
MD made aware of patient's Mg 1.7 and K+ 4.0. Orders to give 2 g Magnesium IV. Will continue to monitor.

## 2019-06-22 NOTE — H&P
"[facility]  Critical Care - Surgery  History & Physical    Patient Name: Cale Anthony Sr.  MRN: [id number]  Admission Date: 6/21/2019  Code Status: No Order  Attending Physician: No att. providers found   Primary Care Provider: Mal Santoyo MD   Principal Problem: Large bowel obstruction    Subjective:     HPI:  Cale Anthony Sr. is a 88 y.o. male with no known medical history (hasn't seen a doctor for most of his life) who presents with progressive anorexia, inability to tolerate PO with N/V, and constipation. He established care with a PCP about a month ago and was tried on various bowel regimens without relief. He now presents with about 10 days without a bowel movement although he continues to pass flatus. Some abdominal cramping but no "pain." Has lost ~60 pounds in the past year. Never had a colonoscopy. Family history significant for colon cancer in a brother at age ~80. 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 Patient underwent emergent total abdominal colectomy with end ileostomy on 6/21/19. Rectosigmoid mass with upstream dilation was noted, and the cecum was >10cm with ischemic changes.  cc. He was extubated in the OR.    Hospital/ICU Course:  No notes on file    Follow-up For: Procedure(s) (LRB):  COLECTOMY, TOTAL, ABDOMINAL with end ileostomy (N/A)    Post-Operative Day: Day of Surgery     History reviewed. No pertinent past medical history.    History reviewed. No pertinent surgical history.    Review of patient's allergies indicates:  No Known Allergies    Family History     Problem Relation (Age of Onset)    Cancer Brother    Diabetes Mother    No Known Problems Father, Sister        Tobacco Use    Smoking status: Never Smoker    Smokeless tobacco: Never Used   Substance and Sexual Activity    Alcohol use: Not Currently    Drug use: " Not Currently    Sexual activity: Not on file      Review of Systems   Unable to perform ROS: Acuity of condition     Objective:     Vital Signs (Most Recent):  Temp: 97.3 °F (36.3 °C) (06/21/19 2030)  Pulse: 63 (06/21/19 2100)  Resp: 13 (06/21/19 2100)  BP: (!) 159/66 (06/21/19 2100)  SpO2: 96 % (06/21/19 2100) Vital Signs (24h Range):  Temp:  [97 °F (36.1 °C)-98.5 °F (36.9 °C)] 97.3 °F (36.3 °C)  Pulse:  [62-85] 63  Resp:  [12-20] 13  SpO2:  [95 %-100 %] 96 %  BP: (136-227)/(66-96) 159/66  Arterial Line BP: (154-176)/(72-88) 157/88     Weight: 59.4 kg (131 lb)  Body mass index is 21.8 kg/m².      Intake/Output Summary (Last 24 hours) at 6/21/2019 2148  Last data filed at 6/21/2019 2040  Gross per 24 hour   Intake 2633.33 ml   Output 550 ml   Net 2083.33 ml       Physical Exam   Constitutional: He is oriented to person, place, and time. He appears well-developed and well-nourished. No distress.   HENT:   Head: Normocephalic and atraumatic.   Eyes: No scleral icterus.   Cardiovascular: Normal rate and regular rhythm.   Pulmonary/Chest: Effort normal. No respiratory distress.   Abdominal: Soft. He exhibits no distension. There is tenderness. There is no guarding.   Midline bandage with scant sanguinous drainage  RLQ ileostomy pink and healthy appearing   Neurological: He is alert and oriented to person, place, and time.   Skin: Skin is warm and dry.   Psychiatric: He has a normal mood and affect. His behavior is normal. Judgment and thought content normal.   Nursing note and vitals reviewed.      Vents:       Lines/Drains/Airways     Drain                 Ileostomy 06/21/19 1735 other (see comments) less than 1 day         NG/OG Tube 06/21/19 1630 [place] sump 18 Fr. Right nostril less than 1 day         Urethral Catheter 06/21/19 Non-latex 12 Fr. less than 1 day          Peripheral Intravenous Line                 Peripheral IV - Single Lumen 06/21/19 1122 22 G Left Forearm less than 1 day         Peripheral IV -  Single Lumen 06/21/19 1545 20 G Right Forearm less than 1 day                Significant Labs:    CBC/Anemia Profile:  Recent Labs   Lab 06/21/19  1122 06/21/19  1941   WBC 8.50 11.37   HGB 13.0* 11.2*   HCT 40.0 33.6*    204   MCV 83 81*   RDW 14.2 13.9        Chemistries:  Recent Labs   Lab 06/21/19  1122 06/21/19  1941   * 134*   K 4.9 4.4    104   CO2 20* 20*   BUN 10 10   CREATININE 0.8 0.6   CALCIUM 9.4 7.7*   ALBUMIN 3.5 2.6*   PROT 7.5 5.2*   BILITOT 0.5 0.3   ALKPHOS 45* 36*   ALT 28 25   AST 31 22       All pertinent labs within the past 24 hours have been reviewed.    Significant Imaging: I have reviewed all pertinent imaging results/findings within the past 24 hours.    Assessment/Plan:     * Large bowel obstruction  Cale Anthony Sr. is a 88 y.o. male with no known medical history (hasn't seen a doctor for most of his life) who presents with progressive anorexia, inability to tolerate PO with N/V, and constipation. 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 Patient underwent emergent total abdominal colectomy with end ileostomy on 6/21/19. Rectosigmoid mass with upstream dilation was noted, and the cecum was >10cm with ischemic changes.    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 [place] management per primary team.  -Monitor UOP   -Bun/Cr 10/0.6 post-op    Fluids/Electrolytes/Nutrition/GI:   -Nutritional status: NPO  -replace lytes PRN  -maintenance fluids @ 100 cc/hr  -NG tube to LIWS    Hematology/Oncology:  -H/H 11.2/33.6 post-op  -Monitor  H/H  -Prophylactic lovenox    Infectious Disease:   -Afebrile  -WBC 11.37  -Monitor WBC    Endocrine:  -Monitor BG  -Glucose goal of 140-180'    F: NPO  A: scheduled tylenol; PRN oxy, ibuprofen, dilaudid  S: N/A  T: Lovenox  H: 30 degrees  U: N/A  G: Monitor BG; Goal 140-180    Dispo:  -Continue care in the ICU setting  -Primary: Colorectal      Critical care was time spent personally by me on the following activities: development of treatment plan with patient or surrogate and bedside caregivers, discussions with consultants, evaluation of patient's response to treatment, examination of patient, ordering and performing treatments and interventions, ordering and review of laboratory studies, ordering and review of radiographic studies, pulse oximetry, re-evaluation of patient's condition.  This critical care time did not overlap with that of any other provider or involve time for any procedures.     Keith Luo MD  Critical Care - Surgery  [facility]

## 2019-06-22 NOTE — HPI
"Cale Anthony Sr. is a 88 y.o. male with no known medical history (hasn't seen a doctor for most of his life) who presents with progressive anorexia, inability to tolerate PO with N/V, and constipation. He established care with a PCP about a month ago and was tried on various bowel regimens without relief. He now presents with about 10 days without a bowel movement although he continues to pass flatus. Some abdominal cramping but no "pain." Has lost ~60 pounds in the past year. Never had a colonoscopy. Family history significant for colon cancer in a brother at age ~80. 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 Patient underwent emergent total abdominal colectomy with end ileostomy on 6/21/19. Rectosigmoid mass with upstream dilation was noted, and the cecum was >10cm with ischemic changes.  cc. He was extubated in the OR.  "

## 2019-06-22 NOTE — ASSESSMENT & PLAN NOTE
Cale Anthony Sr. is a 88 y.o. male with no known medical history (hasn't seen a doctor for most of his life) who presents with progressive anorexia, inability to tolerate PO with N/V, and constipation. 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 Patient underwent emergent total abdominal colectomy with end ileostomy on 6/21/19. Rectosigmoid mass with upstream dilation was noted, and the cecum was >10cm with ischemic changes.    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 [place] management per primary team.  -Monitor UOP   -Bun/Cr 10/0.6 post-op    Fluids/Electrolytes/Nutrition/GI:   -Nutritional status: NPO  -replace lytes PRN  -maintenance fluids @ 100 cc/hr  -NG tube to LIWS    Hematology/Oncology:  -H/H 11.2/33.6 post-op  -Monitor H/H  -Prophylactic lovenox    Infectious Disease:   -Afebrile  -WBC 11.37  -Monitor WBC    Endocrine:  -Monitor BG  -Glucose goal of 140-180'    F: NPO  A: scheduled tylenol; PRN oxy, ibuprofen, dilaudid  S: N/A  T: Lovenox  H: 30 degrees  U: N/A  G: Monitor BG; Goal 140-180    Dispo:  -Continue care in the ICU setting  -Primary: Colorectal

## 2019-06-22 NOTE — TRANSFER OF CARE
"Anesthesia Transfer of Care Note    Patient: Cale Anthony Sr.    Procedure(s) Performed: Procedure(s) (LRB):  COLECTOMY, TOTAL, ABDOMINAL with end ileostomy (N/A)    Patient location: PACU    Anesthesia Type: general    Transport from OR: Transported from OR on 6-10 L/min O2 by face mask with adequate spontaneous ventilation    Post pain: adequate analgesia    Post assessment: tolerated procedure well and no apparent anesthetic complications    Post vital signs: stable    Level of consciousness: awake and alert    Nausea/Vomiting: no nausea/vomiting    Complications: none    Transfer of care protocol was followed      Last vitals:   Visit Vitals  BP (!) 151/82   Pulse 66   Temp 36.1 °C (97 °F) (Temporal)   Resp 12   Ht 5' 5" (1.651 m)   Wt 59.4 kg (131 lb)   SpO2 95%   BMI 21.80 kg/m²     "

## 2019-06-22 NOTE — NURSING TRANSFER
Nursing Transfer Note      6/21/2019     Transfer To: [id number] from PACU (when room ready)    Transfer via bed    Transfer with cardiac monitoring    Transported by RN and Transport    Medicines sent: LR infusing    Chart send with patient: Yes    Notified: carrillo Tapia    Patient reassessed at: 6/21/19 20:30

## 2019-06-22 NOTE — PLAN OF CARE
"Problem: Adult Inpatient Plan of Care  Goal: Plan of Care Review  Outcome: Ongoing (interventions implemented as appropriate)  Dx: Large bowel obstruction    Shift Events: VSS throughout shift.  Room air. Afebrile. Ileostomy stoma red and moist. Liquid brown stool noted from ileostomy. NG tube to LIWS per MD order. KUB ordered to verify placement. 2 g Magnesium given Plan of care reviewed with patient and family. Questions and concerns addressed. Will continue to monitor.     Neuro: AAO x4, Arouses to Voice, Follows Commands and Moves All Extremities    Vital Signs: BP (!) 121/56   Pulse (!) 55   Temp 97.8 °F (36.6 °C) (Oral)   Resp 12   Ht 5' 5" (1.651 m)   Wt 59.4 kg (131 lb)   SpO2 99%   BMI 21.80 kg/m²     Diet: NPO    Gtts: MIVF LR @ 100 cc/hr    Urine Output: Urinary Catheter 865 cc/shift    Drains: Ileostomy 330 cc/shift     Labs/Accuchecks:  Daily labs.    Skin: Ostomy stoma red and moist. No skin breakdown to sacral region and heels. Foam dressings applied to both to prevent skin breakdown.          "

## 2019-06-22 NOTE — ANESTHESIA POSTPROCEDURE EVALUATION
Anesthesia Post Evaluation    Patient: Cale Anthony     Procedure(s) Performed: Procedure(s) (LRB):  COLECTOMY, TOTAL, ABDOMINAL with end ileostomy (N/A)    Final Anesthesia Type: general  Patient location during evaluation: PACU  Patient participation: Yes- Able to Participate  Level of consciousness: awake and alert  Post-procedure vital signs: reviewed and stable  Pain management: adequate  Airway patency: patent  PONV status at discharge: No PONV  Anesthetic complications: no      Cardiovascular status: stable  Respiratory status: unassisted and spontaneous ventilation  Hydration status: euvolemic  Follow-up not needed.          Vitals Value Taken Time   /58 6/22/2019  3:31 AM   Temp 36.6 °C (97.8 °F) 6/22/2019  3:00 AM   Pulse 59 6/22/2019  3:44 AM   Resp 13 6/22/2019  3:44 AM   SpO2 99 % 6/22/2019  3:44 AM   Vitals shown include unvalidated device data.      Event Time     Out of Recovery 21:28:00          Pain/Umu Score: Pain Rating Prior to Med Admin: 0 (6/21/2019  9:55 PM)  Umu Score: 10 (6/21/2019  8:44 PM)

## 2019-06-23 LAB
ANION GAP SERPL CALC-SCNC: 5 MMOL/L (ref 8–16)
ANION GAP SERPL CALC-SCNC: 6 MMOL/L (ref 8–16)
BASOPHILS # BLD AUTO: 0.02 K/UL (ref 0–0.2)
BASOPHILS NFR BLD: 0.2 % (ref 0–1.9)
BUN SERPL-MCNC: 4 MG/DL (ref 8–23)
BUN SERPL-MCNC: 6 MG/DL (ref 8–23)
CALCIUM SERPL-MCNC: 8.8 MG/DL (ref 8.7–10.5)
CALCIUM SERPL-MCNC: 8.9 MG/DL (ref 8.7–10.5)
CHLORIDE SERPL-SCNC: 100 MMOL/L (ref 95–110)
CHLORIDE SERPL-SCNC: 99 MMOL/L (ref 95–110)
CO2 SERPL-SCNC: 26 MMOL/L (ref 23–29)
CO2 SERPL-SCNC: 27 MMOL/L (ref 23–29)
CREAT SERPL-MCNC: 0.6 MG/DL (ref 0.5–1.4)
CREAT SERPL-MCNC: 0.8 MG/DL (ref 0.5–1.4)
DIFFERENTIAL METHOD: ABNORMAL
EOSINOPHIL # BLD AUTO: 0.1 K/UL (ref 0–0.5)
EOSINOPHIL NFR BLD: 0.7 % (ref 0–8)
ERYTHROCYTE [DISTWIDTH] IN BLOOD BY AUTOMATED COUNT: 14.6 % (ref 11.5–14.5)
EST. GFR  (AFRICAN AMERICAN): >60 ML/MIN/1.73 M^2
EST. GFR  (AFRICAN AMERICAN): >60 ML/MIN/1.73 M^2
EST. GFR  (NON AFRICAN AMERICAN): >60 ML/MIN/1.73 M^2
EST. GFR  (NON AFRICAN AMERICAN): >60 ML/MIN/1.73 M^2
GLUCOSE SERPL-MCNC: 116 MG/DL (ref 70–110)
GLUCOSE SERPL-MCNC: 279 MG/DL (ref 70–110)
HCT VFR BLD AUTO: 35.5 % (ref 40–54)
HGB BLD-MCNC: 11.5 G/DL (ref 14–18)
IMM GRANULOCYTES # BLD AUTO: 0.04 K/UL (ref 0–0.04)
IMM GRANULOCYTES NFR BLD AUTO: 0.4 % (ref 0–0.5)
LYMPHOCYTES # BLD AUTO: 1.5 K/UL (ref 1–4.8)
LYMPHOCYTES NFR BLD: 14.1 % (ref 18–48)
MAGNESIUM SERPL-MCNC: 2 MG/DL (ref 1.6–2.6)
MCH RBC QN AUTO: 27.1 PG (ref 27–31)
MCHC RBC AUTO-ENTMCNC: 32.4 G/DL (ref 32–36)
MCV RBC AUTO: 84 FL (ref 82–98)
MONOCYTES # BLD AUTO: 0.7 K/UL (ref 0.3–1)
MONOCYTES NFR BLD: 6.5 % (ref 4–15)
NEUTROPHILS # BLD AUTO: 8.4 K/UL (ref 1.8–7.7)
NEUTROPHILS NFR BLD: 78.1 % (ref 38–73)
NRBC BLD-RTO: 0 /100 WBC
PHOSPHATE SERPL-MCNC: 2.3 MG/DL (ref 2.7–4.5)
PLATELET # BLD AUTO: 208 K/UL (ref 150–350)
PMV BLD AUTO: 11.1 FL (ref 9.2–12.9)
POTASSIUM SERPL-SCNC: 4.2 MMOL/L (ref 3.5–5.1)
POTASSIUM SERPL-SCNC: 5.1 MMOL/L (ref 3.5–5.1)
RBC # BLD AUTO: 4.25 M/UL (ref 4.6–6.2)
SODIUM SERPL-SCNC: 130 MMOL/L (ref 136–145)
SODIUM SERPL-SCNC: 133 MMOL/L (ref 136–145)
WBC # BLD AUTO: 10.73 K/UL (ref 3.9–12.7)

## 2019-06-23 PROCEDURE — 25000003 PHARM REV CODE 250: Performed by: STUDENT IN AN ORGANIZED HEALTH CARE EDUCATION/TRAINING PROGRAM

## 2019-06-23 PROCEDURE — 85025 COMPLETE CBC W/AUTO DIFF WBC: CPT

## 2019-06-23 PROCEDURE — 97165 OT EVAL LOW COMPLEX 30 MIN: CPT

## 2019-06-23 PROCEDURE — 80048 BASIC METABOLIC PNL TOTAL CA: CPT

## 2019-06-23 PROCEDURE — 84100 ASSAY OF PHOSPHORUS: CPT

## 2019-06-23 PROCEDURE — 20600001 HC STEP DOWN PRIVATE ROOM

## 2019-06-23 PROCEDURE — 80048 BASIC METABOLIC PNL TOTAL CA: CPT | Mod: 91

## 2019-06-23 PROCEDURE — 83735 ASSAY OF MAGNESIUM: CPT

## 2019-06-23 PROCEDURE — 36415 COLL VENOUS BLD VENIPUNCTURE: CPT

## 2019-06-23 PROCEDURE — 63600175 PHARM REV CODE 636 W HCPCS: Performed by: STUDENT IN AN ORGANIZED HEALTH CARE EDUCATION/TRAINING PROGRAM

## 2019-06-23 PROCEDURE — 97161 PT EVAL LOW COMPLEX 20 MIN: CPT

## 2019-06-23 PROCEDURE — S0028 INJECTION, FAMOTIDINE, 20 MG: HCPCS | Performed by: STUDENT IN AN ORGANIZED HEALTH CARE EDUCATION/TRAINING PROGRAM

## 2019-06-23 PROCEDURE — S5010 5% DEXTROSE AND 0.45% SALINE: HCPCS | Performed by: STUDENT IN AN ORGANIZED HEALTH CARE EDUCATION/TRAINING PROGRAM

## 2019-06-23 RX ORDER — DEXTROSE MONOHYDRATE AND SODIUM CHLORIDE 5; .45 G/100ML; G/100ML
INJECTION, SOLUTION INTRAVENOUS CONTINUOUS
Status: ACTIVE | OUTPATIENT
Start: 2019-06-23 | End: 2019-06-25

## 2019-06-23 RX ADMIN — DEXTROSE AND SODIUM CHLORIDE: 5; .45 INJECTION, SOLUTION INTRAVENOUS at 10:06

## 2019-06-23 RX ADMIN — OXYCODONE HYDROCHLORIDE 5 MG: 5 TABLET ORAL at 07:06

## 2019-06-23 RX ADMIN — MUPIROCIN 1 G: 20 OINTMENT TOPICAL at 08:06

## 2019-06-23 RX ADMIN — TAMSULOSIN HYDROCHLORIDE 0.4 MG: 0.4 CAPSULE ORAL at 09:06

## 2019-06-23 RX ADMIN — MUPIROCIN 1 G: 20 OINTMENT TOPICAL at 09:06

## 2019-06-23 RX ADMIN — SODIUM PHOSPHATE, MONOBASIC, MONOHYDRATE 15 MMOL: 276; 142 INJECTION, SOLUTION INTRAVENOUS at 04:06

## 2019-06-23 RX ADMIN — FAMOTIDINE 20 MG: 10 INJECTION, SOLUTION INTRAVENOUS at 09:06

## 2019-06-23 RX ADMIN — ENOXAPARIN SODIUM 40 MG: 100 INJECTION SUBCUTANEOUS at 09:06

## 2019-06-23 RX ADMIN — FAMOTIDINE 20 MG: 10 INJECTION, SOLUTION INTRAVENOUS at 08:06

## 2019-06-23 RX ADMIN — OXYCODONE HYDROCHLORIDE 5 MG: 5 TABLET ORAL at 04:06

## 2019-06-23 NOTE — CARE UPDATE
Received call from nurse regarding bloody drainage from NG tube. Patient's vital signs are stable. Not tachyucardic. BP stable. No other complaints. Abdominal exam revealed ostomy functioning, pink and appears viable. Ordered NG tube to be removed. Will not order CBC for now as his vitals are stable. If he decompensates, will act accordingly.

## 2019-06-23 NOTE — PLAN OF CARE
Problem: Physical Therapy Goal  Goal: Physical Therapy Goal  PT goals until 6/30/19    1. Pt supine to sit with supervision-not met  2. Pt sit to supine with supervision-not met  3. Pt sit to stand with no AD with supervision-not met  4. Pt to perform gait 300ft with no AD with supervision.-not met    Outcome: Ongoing (interventions implemented as appropriate)  Pt's goals set and pt will benefit from skilled PT services to work towards improved functional mobility including: bed mobility, transfers, and gait.   Fara Mancera, PT  6/23/2019

## 2019-06-23 NOTE — PROGRESS NOTES
[facility]  Colorectal Surgery  Progress Note    Patient Name: Cale Anthony Sr.  MRN: [id number]  Admission Date: 6/21/2019  Hospital Length of Stay: 2 days  Attending Physician: Dino Rodríguez MD    Subjective:     Interval History: Did well overnight.  Pain controlled.  150 from ostomy.    Post-Op Info:  Procedure(s) (LRB):  COLECTOMY, TOTAL, ABDOMINAL with end ileostomy (N/A)   2 Days Post-Op      Medications:  Continuous Infusions:   dextrose 5 % and 0.45 % NaCl with KCl 20 mEq 75 mL/hr at 06/22/19 2318     Scheduled Meds:   enoxaparin  40 mg Subcutaneous Daily    famotidine (PF)  20 mg Intravenous BID    mupirocin  1 g Nasal BID    tamsulosin  0.4 mg Oral Daily     PRN Meds:   calcium gluconate IVPB    calcium gluconate IVPB    calcium gluconate IVPB    HYDROmorphone    ibuprofen    magnesium sulfate IVPB    magnesium sulfate IVPB    ondansetron    oxyCODONE    sodium phosphate IVPB    sodium phosphate IVPB    sodium phosphate IVPB        Objective:     Vital Signs (Most Recent):  Temp: 98.5 °F (36.9 °C) (06/23/19 0740)  Pulse: 85 (06/23/19 0740)  Resp: 16 (06/23/19 0740)  BP: (!) 160/91 (06/23/19 0740)  SpO2: 98 % (06/23/19 0740) Vital Signs (24h Range):  Temp:  [97.4 °F (36.3 °C)-99.7 °F (37.6 °C)] 98.5 °F (36.9 °C)  Pulse:  [60-85] 85  Resp:  [11-30] 16  SpO2:  [94 %-100 %] 98 %  BP: (111-163)/(62-97) 160/91     Intake/Output - Last 3 Shifts       06/21 0700 - 06/22 0659 06/22 0700 - 06/23 0659 06/23 0700 - 06/24 0659    P.O.  0     I.V. (mL/kg) 3663.3 (61.7) 866.3 (14.6)     IV Piggyback  300     Total Intake(mL/kg) 3663.3 (61.7) 1166.3 (19.6)     Urine (mL/kg/hr) 1075 1530 (1.1)     Drains  350     Stool 330 200     Blood 100      Total Output 1505 2080     Net +2158.3 -913.8            Stool Occurrence  0 x 0 x          Physical Exam   Constitutional: He is oriented to person, place, and time. No distress.   Eyes: EOM are normal.   Neck: Neck supple.   Cardiovascular:  Normal rate and regular rhythm.   Pulmonary/Chest: Effort normal. No respiratory distress.   Abdominal: Soft.   Incision c/d/i, ileostomy in place, pink/viable, stool in bag   Musculoskeletal: Normal range of motion.   Neurological: He is alert and oriented to person, place, and time.   Skin: Skin is warm and dry.   Vitals reviewed.        Significant Labs:  BMP (Last 3 Results):   Recent Labs   Lab 06/21/19 1941 06/22/19 0323 06/23/19  0421   GLU 95 96 116*   * 133* 133*   K 4.4 4.0 5.1    103 100   CO2 20* 19* 27   BUN 10 9 6*   CREATININE 0.6 0.6 0.6   CALCIUM 7.7* 8.3* 8.8   MG  --  1.7 2.0     CBC (Last 3 Results):   Recent Labs   Lab 06/21/19 1941 06/22/19 0323 06/23/19  0421   WBC 11.37 12.77* 10.73   RBC 4.16* 4.36* 4.25*   HGB 11.2* 11.8* 11.5*   HCT 33.6* 35.5* 35.5*    226 208   MCV 81* 81* 84   MCH 26.9* 27.1 27.1   MCHC 33.3 33.2 32.4       Significant Diagnostics:  I have reviewed all pertinent imaging results/findings within the past 24 hours.    Assessment/Plan:     * Large bowel obstruction  Cale Anthony Sr. is a 88 y.o. 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

## 2019-06-23 NOTE — SUBJECTIVE & OBJECTIVE
Subjective:     Interval History: Did well overnight.  Pain controlled.  150 from ostomy.    Post-Op Info:  Procedure(s) (LRB):  COLECTOMY, TOTAL, ABDOMINAL with end ileostomy (N/A)   2 Days Post-Op      Medications:  Continuous Infusions:   dextrose 5 % and 0.45 % NaCl with KCl 20 mEq 75 mL/hr at 06/22/19 2318     Scheduled Meds:   enoxaparin  40 mg Subcutaneous Daily    famotidine (PF)  20 mg Intravenous BID    mupirocin  1 g Nasal BID    tamsulosin  0.4 mg Oral Daily     PRN Meds:   calcium gluconate IVPB    calcium gluconate IVPB    calcium gluconate IVPB    HYDROmorphone    ibuprofen    magnesium sulfate IVPB    magnesium sulfate IVPB    ondansetron    oxyCODONE    sodium phosphate IVPB    sodium phosphate IVPB    sodium phosphate IVPB        Objective:     Vital Signs (Most Recent):  Temp: 98.5 °F (36.9 °C) (06/23/19 0740)  Pulse: 85 (06/23/19 0740)  Resp: 16 (06/23/19 0740)  BP: (!) 160/91 (06/23/19 0740)  SpO2: 98 % (06/23/19 0740) Vital Signs (24h Range):  Temp:  [97.4 °F (36.3 °C)-99.7 °F (37.6 °C)] 98.5 °F (36.9 °C)  Pulse:  [60-85] 85  Resp:  [11-30] 16  SpO2:  [94 %-100 %] 98 %  BP: (111-163)/(62-97) 160/91     Intake/Output - Last 3 Shifts       06/21 0700 - 06/22 0659 06/22 0700 - 06/23 0659 06/23 0700 - 06/24 0659    P.O.  0     I.V. (mL/kg) 3663.3 (61.7) 866.3 (14.6)     IV Piggyback  300     Total Intake(mL/kg) 3663.3 (61.7) 1166.3 (19.6)     Urine (mL/kg/hr) 1075 1530 (1.1)     Drains  350     Stool 330 200     Blood 100      Total Output 1505 2080     Net +2158.3 -913.8            Stool Occurrence  0 x 0 x          Physical Exam   Constitutional: He is oriented to person, place, and time. No distress.   Eyes: EOM are normal.   Neck: Neck supple.   Cardiovascular: Normal rate and regular rhythm.   Pulmonary/Chest: Effort normal. No respiratory distress.   Abdominal: Soft.   Incision c/d/i, ileostomy in place, pink/viable, stool in bag   Musculoskeletal: Normal range of motion.    Neurological: He is alert and oriented to person, place, and time.   Skin: Skin is warm and dry.   Vitals reviewed.        Significant Labs:  BMP (Last 3 Results):   Recent Labs   Lab 06/21/19 1941 06/22/19 0323 06/23/19  0421   GLU 95 96 116*   * 133* 133*   K 4.4 4.0 5.1    103 100   CO2 20* 19* 27   BUN 10 9 6*   CREATININE 0.6 0.6 0.6   CALCIUM 7.7* 8.3* 8.8   MG  --  1.7 2.0     CBC (Last 3 Results):   Recent Labs   Lab 06/21/19 1941 06/22/19 0323 06/23/19  0421   WBC 11.37 12.77* 10.73   RBC 4.16* 4.36* 4.25*   HGB 11.2* 11.8* 11.5*   HCT 33.6* 35.5* 35.5*    226 208   MCV 81* 81* 84   MCH 26.9* 27.1 27.1   MCHC 33.3 33.2 32.4       Significant Diagnostics:  I have reviewed all pertinent imaging results/findings within the past 24 hours.

## 2019-06-23 NOTE — PLAN OF CARE
Problem: Adult Inpatient Plan of Care  Goal: Plan of Care Review  Outcome: Ongoing (interventions implemented as appropriate)  POC discussed with pt. And verbalized understanding. AAOx4, VSS on RA and telemetry in place with NSR. Abd midline with a tefla dressing and dried drainage. R ileostomy in place and draining thin brown liquid. Jauregui in place and draining clear yellow urine. Sips of water and ice chips tolerated well, and pain managed with PRN medications per current MAR. Remains free of falls and injury. Safety precautions maintained, call light within reach, will continue to monitor.

## 2019-06-23 NOTE — PLAN OF CARE
Problem: Occupational Therapy Goal  Goal: Occupational Therapy Goal  Goals to be met by: 7/1/19     Patient will increase functional independence with ADLs by performing:    UE Dressing with Modified [place].  LE Dressing with Modified [place].  Grooming while standing with Modified [place].  Toileting from toilet with Modified [place] for hygiene and clothing management.   Rolling to Bilateral with Modified [place].   Supine to sit with Modified [place].  Step transfer with Modified [place]  Toilet transfer to toilet with Modified [place].    Outcome: Ongoing (interventions implemented as appropriate)  Evaluated pt and established OT POC. Continue as tolerated.  Dunia Walker OT  6/23/2019

## 2019-06-23 NOTE — PT/OT/SLP EVAL
"Physical Therapy Evaluation    Patient Name:  Cale Anthony Sr.   MRN:  [id number]    Recommendations:     Discharge Recommendations:  home   Discharge Equipment Recommendations: none   Barriers to discharge: None    Assessment:     Cale Anthony Sr. is a 88 y.o. male admitted with a medical diagnosis of Large bowel obstruction.  He presents with the following impairments/functional limitations:  weakness, gait instability, impaired functional mobilty, pain . Pt with instability noted at times during gait.    Rehab Prognosis: Good; patient would benefit from acute skilled PT services to address these deficits and reach maximum level of function.    Recent Surgery: Procedure(s) (LRB):  COLECTOMY, TOTAL, ABDOMINAL with end ileostomy (N/A) 2 Days Post-Op    Plan:     During this hospitalization, patient to be seen 3 x/week to address the identified rehab impairments via gait training, therapeutic activities, therapeutic exercises, neuromuscular re-education and progress toward the following goals:    · Plan of Care Expires:  07/22/19    Subjective   "I have worked hard all my life"  Pain/Comfort:  · Pain Rating 1: 5/10  · Location - Orientation 1: generalized  · Location 1: abdomen  · Pain Addressed 1: Reposition, Cessation of Activity  · Pain Rating Post-Intervention 1: (pt states he feels better after walking but did not grade)    Patients cultural, spiritual, Christian conflicts given the current situation: no    Living Environment:  Pt lives with his wife and one of his daughters stays with them at night. Pt lives in a 1 story home with no BALDOMERO  Prior to admission, patients level of function was independent.  Equipment used at home: bath bench, rollator.  Upon discharge, patient will have assistance from children (wife uses a cane for mobility).    Objective:     Communicated with nurse prior to session.  Patient found up in chair with peripheral IV  upon PT entry to room.    General Precautions: Standard, fall "   Orthopedic Precautions:N/A   Braces: N/A     Exams:  · Cognitive Exam:  Patient is oriented to Person, Place and Situation  · Sensation:    · -       Intact  light/touch B LE  · RLE ROM: WFL  · RLE Strength: WFL except hip flex 3+/5 (pain)  · LLE ROM: WFL  · LLE Strength: WFL except hip flex 3+/5 (pain)    Functional Mobility:  · Transfers:     · Sit to Stand:  contact guard assistance with no AD  · Gait: 100ft with no AD with CGA then pt requested HHA for 100ft with CGA . pt with episodes of misstepping and instability.    AM-PAC 6 CLICK MOBILITY  Total Score:18     Patient left up in chair with all lines intact, call button in reach, nurse notified and daughter present.    GOALS:   Multidisciplinary Problems     Physical Therapy Goals        Problem: Physical Therapy Goal    Goal Priority Disciplines Outcome Goal Variances Interventions   Physical Therapy Goal     PT, PT/OT Ongoing (interventions implemented as appropriate)     Description:  PT goals until 6/30/19    1. Pt supine to sit with supervision-not met  2. Pt sit to supine with supervision-not met  3. Pt sit to stand with no AD with supervision-not met  4. Pt to perform gait 300ft with no AD with supervision.-not met                      History:     History reviewed. No pertinent past medical history.    History reviewed. No pertinent surgical history.    Time Tracking:     PT Received On: 06/23/19  PT Start Time: 0955     PT Stop Time: 1013  PT Total Time (min): 18 min     Billable Minutes: Evaluation 18      Fara Mancera, PT  06/23/2019

## 2019-06-23 NOTE — PT/OT/SLP EVAL
"Occupational Therapy   Evaluation    Name: Cale Anthony Sr.  MRN: [id number]  Admitting Diagnosis:  Large bowel obstruction 2 Days Post-Op    Recommendations:     Discharge Recommendations: home  Discharge Equipment Recommendations:  none  Barriers to discharge:  None    Assessment:     Cale Anthony Sr. is a 88 y.o. male with a medical diagnosis of Large bowel obstruction.  He presents with impaired ADL and functional mobility performance. Pt very pleasant during OT/PT evaluation explaining he was (I) prior to admission with ADLs and mod (I) with mobility using rollator as needed. Pt and pt daughter explain pt is very active in community and still works as  at [employer]. Pt demo HHA/CGA during hallway ambulation. Performance deficits affecting function: weakness, impaired endurance, impaired self care skills, impaired functional mobilty, gait instability.  Pt would benefit from continued OT skilled services 2x/wk to improve daily living skills to optimize QOL. Pt is recommended to discharge to home at this time.      Rehab Prognosis: Good; patient would benefit from acute skilled OT services to address these deficits and reach maximum level of function.       Plan:     Patient to be seen 2 x/week to address the above listed problems via self-care/home management, therapeutic activities, therapeutic exercises  · Plan of Care Expires: 07/23/19  · Plan of Care Reviewed with: patient, daughter    Subjective     Chief Complaint: "It's a little painful where I had my surgery"  Patient/Family Comments/goals: "Hold me like we are going to [facility]!"    Occupational Profile:  Living Environment: Pt lives with wife and adult daughter in 1 story house with 1 BALDOMERO. Pt uses a shower/tub combination for bathing.  Previous level of function: (I) with ADLs and functional mobility. Pt reports he has a rollator and uses it as needed.   Roles and Routines: Pt still active in community, working as a  at " [employer].  Equipment Used at Home:  bath bench, rollator  Assistance upon Discharge: none    Pain/Comfort:  · Pain Rating 1: 0/10  · Pain Rating Post-Intervention 1: 0/10    Patients cultural, spiritual, Nondenominational conflicts given the current situation: no    Objective:     Communicated with: RN prior to session.  Patient found up in chair with peripheral IV upon OT entry to room.    General Precautions: Standard,     Orthopedic Precautions:N/A   Braces: N/A     Occupational Performance:    Bed Mobility:    NT as pt found in bedside chair    Functional Mobility/Transfers:  · Patient completed Sit <> Stand Transfer with contact guard assistance  with  hand-held assist   · Functional Mobility: Pt completed bedroom and prolonged hallway ambulation with HHA-CGA throughout. Pt with ~1 LOB however able to self-correct during hallway ambulation.    Activities of Daily Living:  · Upper Body Dressing: minimum assistance donning gown at EOB    Cognitive/Visual Perceptual:  Cognitive/Psychosocial Skills:     -       Oriented to: Person, Place, Time and Situation   -       Follows Commands/attention:Follows multistep  commands  -       Communication: clear/fluent  -       Memory: No Deficits noted  -       Safety awareness/insight to disability: intact   -       Mood/Affect/Coping skills/emotional control: Happy and Pleasant    Physical Exam:  Balance:    -       Demo good sitting and standing balance with HHA  Dominant hand:    -       right  Upper Extremity Range of Motion:     -       Right Upper Extremity: WFL  -       Left Upper Extremity: WFL  Upper Extremity Strength:    -       Right Upper Extremity: WFL  -       Left Upper Extremity: WFL   Strength:    -       Right Upper Extremity: WFL  -       Left Upper Extremity: WFL    AMPAC 6 Click ADL:  AMPAC Total Score: 13    Treatment & Education:  Pt educated on role of occupational therapy, POC, and safety during ADLs and functional mobility. Pt and OT discussed  importance of safe, continued mobility to optimize daily living skills. Pt verbalized understanding. White board updated during session. Pt given instruction to call for medical staff/nurse for assistance.     Education:    Patient left up in chair with all lines intact, call button in reach and daughter present    GOALS:   Multidisciplinary Problems     Occupational Therapy Goals        Problem: Occupational Therapy Goal    Goal Priority Disciplines Outcome Interventions   Occupational Therapy Goal     OT, PT/OT Ongoing (interventions implemented as appropriate)    Description:  Goals to be met by: 7/1/19     Patient will increase functional independence with ADLs by performing:    UE Dressing with Modified [place].  LE Dressing with Modified [place].  Grooming while standing with Modified [place].  Toileting from toilet with Modified [place] for hygiene and clothing management.   Rolling to Bilateral with Modified [place].   Supine to sit with Modified [place].  Step transfer with Modified [place]  Toilet transfer to toilet with Modified [place].                      History:     History reviewed. No pertinent past medical history.    History reviewed. No pertinent surgical history.    Time Tracking:     OT Date of Treatment: 06/23/19  OT Start Time: 0956  OT Stop Time: 1013  OT Total Time (min): 17 min    Billable Minutes:Evaluation 17 min    Dunia Walker OT  6/23/2019

## 2019-06-23 NOTE — NURSING
Notified MD on call of bloody drainage from NG tube, after assessment orders to DC NG tube placed and followed, will continue to monitor.

## 2019-06-23 NOTE — PLAN OF CARE
Problem: Adult Inpatient Plan of Care  Goal: Plan of Care Review  POC reviewed with patient and family. AAOX4. VSS. Tele monitor in place, NSR 70's. ML ABD incision JOSE CRUZ staples with minimal serosanguineous dried drainage.  RLQ ileostomy intact, 25ml of output. IV Sodium phosphate infusing, IVF infusing. Jauregui in place draining yellow urine. SCD's placed. Diet advanced to clear liquids.  No complaints of nausea. Pain reported at a 5 on a 1-10 scale. Prn oxy given. Pt. Up in chair throughout shift. Pt. Ambulated in halls with physical therapy. Call light within reach,. WCTM.

## 2019-06-23 NOTE — ASSESSMENT & PLAN NOTE
Cale Anthony . is a 88 y.o. male with LBO secondary to sigmoid colon mass with cecal pneumatosis on CT scan s/p exlap, TAC w/ EI on 6/21/19     -clears  -MIVFs  -cont [place] until 6/24 per urology for ureteral stricture  -WOCN for new ileostomy  -OOB, PT, IS, ok for lovenox    Dispo: Probably Tuesday

## 2019-06-23 NOTE — OP NOTE
DATE OF PROCEDURE:  06/21/2019.    PREOPERATIVE DIAGNOSIS:  Distal sigmoid colon mass causing large bowel   obstruction.    POSTOPERATIVE DIAGNOSIS:  Distal sigmoid colon mass causing large bowel   obstruction.    PROCEDURE:  Exploratory laparotomy with subtotal colectomy and end   ileostomy.    SURGEON:  Dino Rodríguez M.D.    ASSISTANT:  Dr. Lobito Mccloud.    ANESTHESIA:  General endotracheal.    IV FLUIDS:  2500 mL of crystalloid.    ESTIMATED BLOOD LOSS:  100 mL    URINE OUTPUT:  200 mL    DRAINS:  Jauregui catheter, nasogastric tube.    SPECIMENS:  Colon to Pathology.    COMPLICATIONS:  None.    OPERATIVE FINDINGS:  Mass in the distal sigmoid colon causing complete proximal   colonic obstruction and significant colonic distention with areas of patchy   ischemia in the transverse colon and cecum necessitating a subtotal colectomy.    INDICATIONS FOR PROCEDURE:  The patient is an 88-year-old male who presented to   the Emergency Room with complaints of abdominal pain, distention and several   days of obstipation.  A CT scan demonstrated a mass in the distal sigmoid colon   causing proximal obstruction.  There was significant colonic distention   present with areas suspicious for pneumatosis in the region of the cecum.  For   this reason, the patient is being brought to the Operating Room for urgent   surgical intervention.    DESCRIPTION OF PROCEDURE:  The patient was identified, brought to the Operating   Room and placed on the table in a supine position after obtaining informed   consent.  Venous sequential stockings were placed.  He was given preoperative   intravenous antibiotics and subcutaneous Heparin.  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  Urology was called and they assisted with   placement of a Jauregui catheter.    The patient was then repositioned in a modified lithotomy position using   Yellofin  stephen.  The abdomen was prepped and draped in the usual sterile   fashion.  A midline incision was made through skin, subcutaneous tissue and   fascia to enter the abdominal cavity.  Once the incision was opened along its   entire length, we placed an Willie wound protector.  The entire colon was noted   to be markedly distended, tapering down to a point in the distal sigmoid colon   where a mass could be palpated within the lumen of the colon.  Distal to this, the   distal sigmoid colon and rectum appeared decompressed.  There were areas   on the cecum and the transverse colon where the bowel was markedly distended,   where there was suspicion of ischemic changes in the wall of the colon.  Given this,   as well as the findings of pneumatosis on the preoperative CT scan, we elected to   proceed with a subtotal colectomy.  Starting on the left side, the lateral attachments   of the colon were incised and the colon and mesentery were mobilized medially off of   the retroperitoneum.  The omentum was detached from the transverse colon and the   splenic flexure was fully mobilized.  We divided the mesentery of the distal   transverse colon, splenic flexure and descending colon down to the level of the   sigmoid mass.  We then turned our attention towards the right colon.  The   lateral attachments were incised and the colon and mesentery were mobilized   medially off of the retroperitoneum.  The hepatic flexure was fully mobilized.    We then divided the mesentery of the proximal transverse colon, hepatic flexure   and the ascending colon down to the pedicle of the ileocolic artery.  We then   divided the terminal ileum with a RUDY stapler.  Remaining mesenteric attachments   including the ileocolic pedicle were divided.  We then identified a point   distal to the tumor as a distal point of division.  The remaining mesenteric   attachments were divided with a LigaSure device and the bowel was transected at   this point  with a RUDY stapler.  The resected colon was passed from the field.    We then copiously irrigated the abdominal cavity.  Hemostasis was noted to be   adequate.  A trephination was created on the patient's right side utilizing a   muscle splitting technique and the end of the small bowel was delivered through   the trephination with a Fredy clamp maintaining proper mesenteric orientation.    We then performed a TAP block using Exparel diluted in injectable saline.  Prior   to closing the abdomen, we did perform a complete exploration.  The liver was   visualized and palpated.  No obvious lesions were noted.  There was no evidence   of carcinomatosis or peritoneal spread present.    The midline fascia was then closed with a running looped #1 PDS suture.  Skin   was closed using skin staples.  Island dressing was placed over the midline   incision.  We then turned our attention towards maturing the ostomy.  The distal   staple line was excised using electrocautery and the ileostomy was matured   placing 3-0 chromic sutures around its perimeter.  An ostomy appliance was cut   to the appropriate size and placed over the stoma.    The patient tolerated the procedure well with no complications.  He extubated in the   Operating room and taken to recovery in stable condition.  All needle, instrument   and sponge counts were correct at the end of the case.  I was present throughout the   entire procedure.      LUAN/HELIO  dd: 06/23/2019 06:56:17 (CDT)  td: 06/23/2019 10:20:27 (KENRICK)  Doc ID   #[id number]  Job ID #[id number]    CC:

## 2019-06-24 LAB
ANION GAP SERPL CALC-SCNC: 10 MMOL/L (ref 8–16)
BASOPHILS # BLD AUTO: 0.01 K/UL (ref 0–0.2)
BASOPHILS NFR BLD: 0.1 % (ref 0–1.9)
BUN SERPL-MCNC: 3 MG/DL (ref 8–23)
CALCIUM SERPL-MCNC: 8.7 MG/DL (ref 8.7–10.5)
CHLORIDE SERPL-SCNC: 97 MMOL/L (ref 95–110)
CO2 SERPL-SCNC: 24 MMOL/L (ref 23–29)
CREAT SERPL-MCNC: 0.5 MG/DL (ref 0.5–1.4)
DIFFERENTIAL METHOD: ABNORMAL
EOSINOPHIL # BLD AUTO: 0.1 K/UL (ref 0–0.5)
EOSINOPHIL NFR BLD: 1.1 % (ref 0–8)
ERYTHROCYTE [DISTWIDTH] IN BLOOD BY AUTOMATED COUNT: 14.4 % (ref 11.5–14.5)
EST. GFR  (AFRICAN AMERICAN): >60 ML/MIN/1.73 M^2
EST. GFR  (NON AFRICAN AMERICAN): >60 ML/MIN/1.73 M^2
GLUCOSE SERPL-MCNC: 108 MG/DL (ref 70–110)
HCT VFR BLD AUTO: 32.3 % (ref 40–54)
HGB BLD-MCNC: 10.7 G/DL (ref 14–18)
IMM GRANULOCYTES # BLD AUTO: 0.04 K/UL (ref 0–0.04)
IMM GRANULOCYTES NFR BLD AUTO: 0.4 % (ref 0–0.5)
LYMPHOCYTES # BLD AUTO: 1.4 K/UL (ref 1–4.8)
LYMPHOCYTES NFR BLD: 15.5 % (ref 18–48)
MAGNESIUM SERPL-MCNC: 1.7 MG/DL (ref 1.6–2.6)
MCH RBC QN AUTO: 27.3 PG (ref 27–31)
MCHC RBC AUTO-ENTMCNC: 33.1 G/DL (ref 32–36)
MCV RBC AUTO: 82 FL (ref 82–98)
MONOCYTES # BLD AUTO: 0.6 K/UL (ref 0.3–1)
MONOCYTES NFR BLD: 6.9 % (ref 4–15)
NEUTROPHILS # BLD AUTO: 6.8 K/UL (ref 1.8–7.7)
NEUTROPHILS NFR BLD: 76 % (ref 38–73)
NRBC BLD-RTO: 0 /100 WBC
PHOSPHATE SERPL-MCNC: 3 MG/DL (ref 2.7–4.5)
PHOSPHATE SERPL-MCNC: 3 MG/DL (ref 2.7–4.5)
PLATELET # BLD AUTO: 224 K/UL (ref 150–350)
PMV BLD AUTO: 10.9 FL (ref 9.2–12.9)
POTASSIUM SERPL-SCNC: 3.9 MMOL/L (ref 3.5–5.1)
RBC # BLD AUTO: 3.92 M/UL (ref 4.6–6.2)
SODIUM SERPL-SCNC: 131 MMOL/L (ref 136–145)
WBC # BLD AUTO: 8.89 K/UL (ref 3.9–12.7)

## 2019-06-24 PROCEDURE — 25000003 PHARM REV CODE 250: Performed by: STUDENT IN AN ORGANIZED HEALTH CARE EDUCATION/TRAINING PROGRAM

## 2019-06-24 PROCEDURE — S0028 INJECTION, FAMOTIDINE, 20 MG: HCPCS | Performed by: STUDENT IN AN ORGANIZED HEALTH CARE EDUCATION/TRAINING PROGRAM

## 2019-06-24 PROCEDURE — 36415 COLL VENOUS BLD VENIPUNCTURE: CPT

## 2019-06-24 PROCEDURE — 63600175 PHARM REV CODE 636 W HCPCS: Performed by: STUDENT IN AN ORGANIZED HEALTH CARE EDUCATION/TRAINING PROGRAM

## 2019-06-24 PROCEDURE — 80048 BASIC METABOLIC PNL TOTAL CA: CPT

## 2019-06-24 PROCEDURE — 85025 COMPLETE CBC W/AUTO DIFF WBC: CPT

## 2019-06-24 PROCEDURE — 20600001 HC STEP DOWN PRIVATE ROOM

## 2019-06-24 PROCEDURE — 94761 N-INVAS EAR/PLS OXIMETRY MLT: CPT

## 2019-06-24 PROCEDURE — 84100 ASSAY OF PHOSPHORUS: CPT

## 2019-06-24 PROCEDURE — 97116 GAIT TRAINING THERAPY: CPT

## 2019-06-24 PROCEDURE — 83735 ASSAY OF MAGNESIUM: CPT

## 2019-06-24 PROCEDURE — S5010 5% DEXTROSE AND 0.45% SALINE: HCPCS | Performed by: STUDENT IN AN ORGANIZED HEALTH CARE EDUCATION/TRAINING PROGRAM

## 2019-06-24 PROCEDURE — 84100 ASSAY OF PHOSPHORUS: CPT | Mod: 91

## 2019-06-24 RX ORDER — SIMETHICONE 80 MG
1 TABLET,CHEWABLE ORAL 3 TIMES DAILY PRN
Status: DISCONTINUED | OUTPATIENT
Start: 2019-06-24 | End: 2019-06-30 | Stop reason: HOSPADM

## 2019-06-24 RX ADMIN — TAMSULOSIN HYDROCHLORIDE 0.4 MG: 0.4 CAPSULE ORAL at 09:06

## 2019-06-24 RX ADMIN — SIMETHICONE CHEW TAB 80 MG 80 MG: 80 TABLET ORAL at 11:06

## 2019-06-24 RX ADMIN — PROMETHAZINE HYDROCHLORIDE 12.5 MG: 25 INJECTION INTRAMUSCULAR; INTRAVENOUS at 10:06

## 2019-06-24 RX ADMIN — ONDANSETRON 4 MG: 2 INJECTION INTRAMUSCULAR; INTRAVENOUS at 08:06

## 2019-06-24 RX ADMIN — MUPIROCIN 1 G: 20 OINTMENT TOPICAL at 08:06

## 2019-06-24 RX ADMIN — FAMOTIDINE 20 MG: 10 INJECTION, SOLUTION INTRAVENOUS at 08:06

## 2019-06-24 RX ADMIN — DEXTROSE AND SODIUM CHLORIDE: 5; .45 INJECTION, SOLUTION INTRAVENOUS at 09:06

## 2019-06-24 RX ADMIN — MUPIROCIN 1 G: 20 OINTMENT TOPICAL at 09:06

## 2019-06-24 RX ADMIN — ENOXAPARIN SODIUM 40 MG: 100 INJECTION SUBCUTANEOUS at 08:06

## 2019-06-24 NOTE — PROGRESS NOTES
[facility]  Colorectal Surgery  Progress Note    Patient Name: Cale Anthony Sr.  MRN: [id number]  Admission Date: 6/21/2019  Hospital Length of Stay: 3 days  Attending Physician: Dino Rodríguez MD    Subjective:     Interval History: No acute events. Tolerating clears. Feels a little bloated.    Post-Op Info:  Procedure(s) (LRB):  COLECTOMY, TOTAL, ABDOMINAL with end ileostomy (N/A)   3 Days Post-Op      Medications:  Continuous Infusions:   dextrose 5 % and 0.45 % NaCl 50 mL/hr at 06/23/19 1027     Scheduled Meds:   enoxaparin  40 mg Subcutaneous Daily    famotidine (PF)  20 mg Intravenous BID    mupirocin  1 g Nasal BID    tamsulosin  0.4 mg Oral Daily     PRN Meds:   calcium gluconate IVPB    calcium gluconate IVPB    calcium gluconate IVPB    HYDROmorphone    ibuprofen    magnesium sulfate IVPB    magnesium sulfate IVPB    ondansetron    oxyCODONE    sodium phosphate IVPB    sodium phosphate IVPB    sodium phosphate IVPB        Objective:     Vital Signs (Most Recent):  Temp: 98.5 °F (36.9 °C) (06/24/19 0420)  Pulse: 86 (06/24/19 0705)  Resp: 16 (06/24/19 0420)  BP: (!) 146/70 (06/24/19 0420)  SpO2: 96 % (06/24/19 0420) Vital Signs (24h Range):  Temp:  [97.1 °F (36.2 °C)-99.3 °F (37.4 °C)] 98.5 °F (36.9 °C)  Pulse:  [76-91] 86  Resp:  [16-17] 16  SpO2:  [96 %-98 %] 96 %  BP: (120-158)/(66-70) 146/70     Intake/Output - Last 3 Shifts       06/22 0700 - 06/23 0659 06/23 0700 - 06/24 0659 06/24 0700 - 06/25 0659    P.O. 0 200     I.V. (mL/kg) 866.3 (14.6) 377.5 (6.4)     IV Piggyback 300 250     Total Intake(mL/kg) 1166.3 (19.6) 827.5 (13.9)     Urine (mL/kg/hr) 1530 (1.1) 1375 (1)     Drains 350      Stool 200 25     Blood       Total Output 2080 1400     Net -913.8 -572.5            Stool Occurrence 0 x 0 x           Physical Exam   Constitutional: He is oriented to person, place, and time. No distress.   Eyes: EOM are normal.   Neck: Neck supple.   Cardiovascular: Normal rate  and regular rhythm.   Pulmonary/Chest: Effort normal. No respiratory distress.   Abdominal: Soft.   Incision c/d/i, ileostomy in place, pink/viable, small amount of stool in bag; a little distended   Musculoskeletal: Normal range of motion.   Neurological: He is alert and oriented to person, place, and time.   Skin: Skin is warm and dry.   Vitals reviewed.        Significant Labs:  BMP (Last 3 Results):   Recent Labs   Lab 06/22/19  0323 06/23/19  0421 06/23/19  1232 06/24/19  0500   GLU 96 116* 279* 108   * 133* 130* 131*   K 4.0 5.1 4.2 3.9    100 99 97   CO2 19* 27 26 24   BUN 9 6* 4* 3*   CREATININE 0.6 0.6 0.8 0.5   CALCIUM 8.3* 8.8 8.9 8.7   MG 1.7 2.0  --  1.7     CBC (Last 3 Results):   Recent Labs   Lab 06/22/19  0323 06/23/19  0421 06/24/19  0500   WBC 12.77* 10.73 8.89   RBC 4.36* 4.25* 3.92*   HGB 11.8* 11.5* 10.7*   HCT 35.5* 35.5* 32.3*    208 224   MCV 81* 84 82   MCH 27.1 27.1 27.3   MCHC 33.2 32.4 33.1       Significant Diagnostics:  I have reviewed all pertinent imaging results/findings within the past 24 hours.    Assessment/Plan:     * Large bowel obstruction  Cale Anthony Sr. is a 88 y.o. male with LBO secondary to sigmoid colon mass with cecal pneumatosis on CT scan s/p exlap, TAC w/ EI on 6/21/19     -pending path  -clears with boost now; taking diet slow with distension on exam this morning  -MIVFs at 50  -remove Jauregui  -WOCN for new ileostomy  -OOB, PT, IS, ok for lovenox    Dispo: Probably Wednesday          W Bandar Frey MD  Colorectal Surgery  [facility]

## 2019-06-24 NOTE — PT/OT/SLP PROGRESS
"Physical Therapy Treatment    Patient Name:  Cale Anthony Sr.   MRN:  [id number]    Recommendations:     Discharge Recommendations:  home health PT   Discharge Equipment Recommendations: none   Barriers to discharge: Decreased caregiver support and mild impulsivity    Assessment:     Cale Anthony Sr. is a 88 y.o. male admitted with a medical diagnosis of Large bowel obstruction.  He presents with the following impairments/functional limitations:  weakness, gait instability, impaired balance, impaired self care skills.  Patient with increased drowsiness today following nausea medication and required multiple attempts before able to be seen secondary to this drowsiness.  Patient was with observed path deviations, sway and lean to R with ambulation leading to a need for continued therapy to increase home safety and functional mobility.     Rehab Prognosis: Good; patient would continue to benefit from acute skilled PT 4x/wk services to address these deficits and reach maximum level of function and he will benefit from some  PT upon discharge to ensure safety and improve functional mobility.    Recent Surgery: Procedure(s) (LRB):  COLECTOMY, TOTAL, ABDOMINAL with end ileostomy (N/A) 3 Days Post-Op    Plan:     During this hospitalization, patient to be seen 4 x/week(increased secondary to continued weakness) to address the identified rehab impairments via gait training, therapeutic activities, therapeutic exercises, therapeutic groups, neuromuscular re-education and progress toward the following goals:    · Plan of Care Expires:  07/22/19    Subjective     Chief Complaint: "I have been sleepy most of the day."  Pain/Comfort:  · Pain Rating 1: 0/10  · Pain Rating Post-Intervention 1: 0/10      Objective:     Communicated with RN prior to session.  Patient found up in chair with peripheral IV upon PT entry to room.     General Precautions: Standard, fall   Orthopedic Precautions:N/A   Braces: N/A     Functional " Mobility:  · Transfers:     · Sit to Stand:  contact guard assistance with no AD  · Gait: Ambulation of 180 ft with CGA and no AD.      AM-PAC 6 CLICK MOBILITY  Turning over in bed (including adjusting bedclothes, sheets and blankets)?: 3  Sitting down on and standing up from a chair with arms (e.g., wheelchair, bedside commode, etc.): 3  Moving from lying on back to sitting on the side of the bed?: 3  Moving to and from a bed to a chair (including a wheelchair)?: 3  Need to walk in hospital room?: 3  Climbing 3-5 steps with a railing?: 3  Basic Mobility Total Score: 18       Therapeutic Activities and Exercises:  Ambulation: Patient ambulated 180 ft with increased trunk sway and path deviations secondary to drowsiness and weakness. Cues provided for sequencing, pacing and safety.  Patient required cues for base of support with observed path deviations.    Patient Education:    Patient educated on home safety, Fall risk and posture by explanation.  Patient was receptive to education and verbalizes understanding.    Patient left up in chair with all lines intact, call button in reach and family present..    GOALS:   Multidisciplinary Problems     Physical Therapy Goals        Problem: Physical Therapy Goal    Goal Priority Disciplines Outcome Goal Variances Interventions   Physical Therapy Goal     PT, PT/OT Ongoing (interventions implemented as appropriate)     Description:  PT goals until 6/30/19    1. Pt supine to sit with supervision-not met  2. Pt sit to supine with supervision-not met  3. Pt sit to stand with no AD with supervision-not met  4. Pt to perform gait 300ft with no AD with supervision.-not met                      Time Tracking:     PT Received On: 06/24/19  PT Start Time: 1438     PT Stop Time: 1449  PT Total Time (min): 11 min     Billable Minutes: Gait Training 11 min    Treatment Type: Treatment  PT/PTA: PT           Darrion Dallas, PT  06/24/2019

## 2019-06-24 NOTE — SUBJECTIVE & OBJECTIVE
Subjective:     Interval History: No acute events. Tolerating clears. Feels a little bloated.    Post-Op Info:  Procedure(s) (LRB):  COLECTOMY, TOTAL, ABDOMINAL with end ileostomy (N/A)   3 Days Post-Op      Medications:  Continuous Infusions:   dextrose 5 % and 0.45 % NaCl 50 mL/hr at 06/23/19 1027     Scheduled Meds:   enoxaparin  40 mg Subcutaneous Daily    famotidine (PF)  20 mg Intravenous BID    mupirocin  1 g Nasal BID    tamsulosin  0.4 mg Oral Daily     PRN Meds:   calcium gluconate IVPB    calcium gluconate IVPB    calcium gluconate IVPB    HYDROmorphone    ibuprofen    magnesium sulfate IVPB    magnesium sulfate IVPB    ondansetron    oxyCODONE    sodium phosphate IVPB    sodium phosphate IVPB    sodium phosphate IVPB        Objective:     Vital Signs (Most Recent):  Temp: 98.5 °F (36.9 °C) (06/24/19 0420)  Pulse: 86 (06/24/19 0705)  Resp: 16 (06/24/19 0420)  BP: (!) 146/70 (06/24/19 0420)  SpO2: 96 % (06/24/19 0420) Vital Signs (24h Range):  Temp:  [97.1 °F (36.2 °C)-99.3 °F (37.4 °C)] 98.5 °F (36.9 °C)  Pulse:  [76-91] 86  Resp:  [16-17] 16  SpO2:  [96 %-98 %] 96 %  BP: (120-158)/(66-70) 146/70     Intake/Output - Last 3 Shifts       06/22 0700 - 06/23 0659 06/23 0700 - 06/24 0659 06/24 0700 - 06/25 0659    P.O. 0 200     I.V. (mL/kg) 866.3 (14.6) 377.5 (6.4)     IV Piggyback 300 250     Total Intake(mL/kg) 1166.3 (19.6) 827.5 (13.9)     Urine (mL/kg/hr) 1530 (1.1) 1375 (1)     Drains 350      Stool 200 25     Blood       Total Output 2080 1400     Net -913.8 -572.5            Stool Occurrence 0 x 0 x           Physical Exam   Constitutional: He is oriented to person, place, and time. No distress.   Eyes: EOM are normal.   Neck: Neck supple.   Cardiovascular: Normal rate and regular rhythm.   Pulmonary/Chest: Effort normal. No respiratory distress.   Abdominal: Soft.   Incision c/d/i, ileostomy in place, pink/viable, small amount of stool in bag; a little distended   Musculoskeletal:  Normal range of motion.   Neurological: He is alert and oriented to person, place, and time.   Skin: Skin is warm and dry.   Vitals reviewed.        Significant Labs:  BMP (Last 3 Results):   Recent Labs   Lab 06/22/19  0323 06/23/19  0421 06/23/19  1232 06/24/19  0500   GLU 96 116* 279* 108   * 133* 130* 131*   K 4.0 5.1 4.2 3.9    100 99 97   CO2 19* 27 26 24   BUN 9 6* 4* 3*   CREATININE 0.6 0.6 0.8 0.5   CALCIUM 8.3* 8.8 8.9 8.7   MG 1.7 2.0  --  1.7     CBC (Last 3 Results):   Recent Labs   Lab 06/22/19  0323 06/23/19  0421 06/24/19  0500   WBC 12.77* 10.73 8.89   RBC 4.36* 4.25* 3.92*   HGB 11.8* 11.5* 10.7*   HCT 35.5* 35.5* 32.3*    208 224   MCV 81* 84 82   MCH 27.1 27.1 27.3   MCHC 33.2 32.4 33.1       Significant Diagnostics:  I have reviewed all pertinent imaging results/findings within the past 24 hours.

## 2019-06-24 NOTE — PLAN OF CARE
Problem: Physical Therapy Goal  Goal: Physical Therapy Goal  PT goals until 6/30/19    1. Pt supine to sit with supervision-not met  2. Pt sit to supine with supervision-not met  3. Pt sit to stand with no AD with supervision-not met  4. Pt to perform gait 300ft with no AD with supervision.-not met     Outcome: Ongoing (interventions implemented as appropriate)  Patient progressing appropriately towards current goals and plan of care remains appropriate at this time.     Darrion Dallas, PT, DPT  6/24/2019  Pager #: [phone]

## 2019-06-24 NOTE — NURSING
Ostomy nurse consult for new ileostomy.    PMH: s/p colectomy with ileostomy placement    Patient seen on 10th floor [facility]. Patient up in chair but very groggy. No family at bedside. Patient continues to fall asleep during conversation.   Ostomy appears pink and well budded. Pouch intact with small amount of stool in bag.   Supplies ordered to bedside.     Patient not yet ready for teaching.     Wound care to follow.  Jessy Sepulveda RN [facility]   [phone]

## 2019-06-24 NOTE — ASSESSMENT & PLAN NOTE
Cale Anthony . is a 88 y.o. male with LBO secondary to sigmoid colon mass with cecal pneumatosis on CT scan s/p exlap, TAC w/ EI on 6/21/19     -clears with boost now; taking diet slow with distension on exam this morning  -MIVFs at 50  -remove Jauregui  -WOCN for new ileostomy  -OOB, PT, IS, ok for lovenox    Dispo: Probably Wednesday

## 2019-06-24 NOTE — PLAN OF CARE
met patient and family to obtain discharge planning assessment.  Information obtain from son patient asleep.  Patient is post op day 3 for total colectomy with ileostomy.    PCP:   Mal Santoyo MD     PAYOR:   Payor: [employer]ARE / Plan: [employer] 65 / Product Type: Medicare Advantage /      PHARMACY:    [facility]/pharmacy #[id number] - [place], [place] - [address]  [address]  [place] [address]  Phone: [phone] Fax: [phone]       06/24/19 1323   Discharge Assessment   Assessment Type Discharge Planning Assessment   Confirmed/corrected address and phone number on facesheet? Yes   Assessment information obtained from? Patient;Caregiver   Expected Length of Stay (days) 3   Communicated expected length of stay with patient/caregiver yes   Prior to hospitilization cognitive status: Unable to Assess  ( met with patient and family, patient information obrtained from son, patient asleep.)   Prior to hospitalization functional status: Independent   Current cognitive status: Alert/Oriented   Current Functional Status: Needs Assistance   Lives With spouse   Able to Return to Prior Arrangements yes   Is patient able to care for self after discharge? Unable to determine at this time (comments)   Who are your caregiver(s) and their phone number(s)? Willie Anthony-son [phone]   Patient's perception of discharge disposition home or selfcare   Readmission Within the Last 30 Days no previous admission in last 30 days   Patient currently being followed by outpatient case management? No   Patient currently receives any other outside agency services? No   Equipment Currently Used at Home none   Do you have any problems affording any of your prescribed medications? No   Is the patient taking medications as prescribed? yes   Does the patient have transportation home? Yes   Does the patient receive services at the [facility]? No   Discharge Plan A Home with family    Discharge Plan B Home;Home Health   DME Needed Upon Discharge  none   Patient/Family in Agreement with Plan yes

## 2019-06-24 NOTE — PLAN OF CARE
Problem: Adult Inpatient Plan of Care  Goal: Plan of Care Review  Outcome: Ongoing (interventions implemented as appropriate)  Plan of Care reviewed w/ pt and family at bedside. AVSS on RA. SBP <180. Midline w/ dermabond JOSE CRUZ. RUQ ileostomy w/ scant liquid output. Tolerating small amt clear liquid diet. Jauregui removed this AM, voiding CYU in urinal. OOB to chair w/ x1 assist. No c/o pain this shift. Zofran and x1 dose Phenergan given for nausea w/ effect.

## 2019-06-24 NOTE — PLAN OF CARE
Problem: Adult Inpatient Plan of Care  Goal: Plan of Care Review  Outcome: Ongoing (interventions implemented as appropriate)  POC discussed with pt. And verbalized understanding. AAOx4, VSS on RA and telemetry in place with NSR. Abd midline with a tefla dressing and dried drainage. R ileostomy in place and draining thin brown liquid. Jauregui in place and draining clear yellow urine. Clear liquids tolerated well, and pain managed with PRN medications per current MAR. Remains free of falls and injury. Safety precautions maintained, call light within reach, will continue to monitor.

## 2019-06-25 PROBLEM — K56.7 ILEUS FOLLOWING GASTROINTESTINAL SURGERY: Status: ACTIVE | Noted: 2019-06-25

## 2019-06-25 PROBLEM — K91.89 ILEUS FOLLOWING GASTROINTESTINAL SURGERY: Status: ACTIVE | Noted: 2019-06-25

## 2019-06-25 LAB
ANION GAP SERPL CALC-SCNC: 7 MMOL/L (ref 8–16)
BASOPHILS # BLD AUTO: 0.03 K/UL (ref 0–0.2)
BASOPHILS NFR BLD: 0.3 % (ref 0–1.9)
BUN SERPL-MCNC: 4 MG/DL (ref 8–23)
CALCIUM SERPL-MCNC: 8.9 MG/DL (ref 8.7–10.5)
CHLORIDE SERPL-SCNC: 96 MMOL/L (ref 95–110)
CO2 SERPL-SCNC: 27 MMOL/L (ref 23–29)
CREAT SERPL-MCNC: 0.7 MG/DL (ref 0.5–1.4)
DIFFERENTIAL METHOD: ABNORMAL
EOSINOPHIL # BLD AUTO: 0.2 K/UL (ref 0–0.5)
EOSINOPHIL NFR BLD: 1.8 % (ref 0–8)
ERYTHROCYTE [DISTWIDTH] IN BLOOD BY AUTOMATED COUNT: 14.2 % (ref 11.5–14.5)
EST. GFR  (AFRICAN AMERICAN): >60 ML/MIN/1.73 M^2
EST. GFR  (NON AFRICAN AMERICAN): >60 ML/MIN/1.73 M^2
GLUCOSE SERPL-MCNC: 229 MG/DL (ref 70–110)
HCT VFR BLD AUTO: 33.2 % (ref 40–54)
HGB BLD-MCNC: 10.8 G/DL (ref 14–18)
IMM GRANULOCYTES # BLD AUTO: 0.03 K/UL (ref 0–0.04)
IMM GRANULOCYTES NFR BLD AUTO: 0.3 % (ref 0–0.5)
LYMPHOCYTES # BLD AUTO: 1.5 K/UL (ref 1–4.8)
LYMPHOCYTES NFR BLD: 16.5 % (ref 18–48)
MAGNESIUM SERPL-MCNC: 1.7 MG/DL (ref 1.6–2.6)
MCH RBC QN AUTO: 27.3 PG (ref 27–31)
MCHC RBC AUTO-ENTMCNC: 32.5 G/DL (ref 32–36)
MCV RBC AUTO: 84 FL (ref 82–98)
MONOCYTES # BLD AUTO: 0.6 K/UL (ref 0.3–1)
MONOCYTES NFR BLD: 6.5 % (ref 4–15)
NEUTROPHILS # BLD AUTO: 6.7 K/UL (ref 1.8–7.7)
NEUTROPHILS NFR BLD: 74.6 % (ref 38–73)
NRBC BLD-RTO: 0 /100 WBC
PHOSPHATE SERPL-MCNC: 3.1 MG/DL (ref 2.7–4.5)
PLATELET # BLD AUTO: 254 K/UL (ref 150–350)
PMV BLD AUTO: 10.2 FL (ref 9.2–12.9)
POTASSIUM SERPL-SCNC: 4.1 MMOL/L (ref 3.5–5.1)
RBC # BLD AUTO: 3.95 M/UL (ref 4.6–6.2)
SODIUM SERPL-SCNC: 130 MMOL/L (ref 136–145)
WBC # BLD AUTO: 8.92 K/UL (ref 3.9–12.7)

## 2019-06-25 PROCEDURE — S0028 INJECTION, FAMOTIDINE, 20 MG: HCPCS | Performed by: STUDENT IN AN ORGANIZED HEALTH CARE EDUCATION/TRAINING PROGRAM

## 2019-06-25 PROCEDURE — 84100 ASSAY OF PHOSPHORUS: CPT

## 2019-06-25 PROCEDURE — 25000003 PHARM REV CODE 250: Performed by: STUDENT IN AN ORGANIZED HEALTH CARE EDUCATION/TRAINING PROGRAM

## 2019-06-25 PROCEDURE — 76937 US GUIDE VASCULAR ACCESS: CPT

## 2019-06-25 PROCEDURE — 36415 COLL VENOUS BLD VENIPUNCTURE: CPT

## 2019-06-25 PROCEDURE — 63600175 PHARM REV CODE 636 W HCPCS: Performed by: STUDENT IN AN ORGANIZED HEALTH CARE EDUCATION/TRAINING PROGRAM

## 2019-06-25 PROCEDURE — 36569 INSJ PICC 5 YR+ W/O IMAGING: CPT

## 2019-06-25 PROCEDURE — 83735 ASSAY OF MAGNESIUM: CPT

## 2019-06-25 PROCEDURE — 97116 GAIT TRAINING THERAPY: CPT

## 2019-06-25 PROCEDURE — A4216 STERILE WATER/SALINE, 10 ML: HCPCS | Performed by: COLON & RECTAL SURGERY

## 2019-06-25 PROCEDURE — 80048 BASIC METABOLIC PNL TOTAL CA: CPT

## 2019-06-25 PROCEDURE — C1751 CATH, INF, PER/CENT/MIDLINE: HCPCS

## 2019-06-25 PROCEDURE — 20600001 HC STEP DOWN PRIVATE ROOM

## 2019-06-25 PROCEDURE — 85025 COMPLETE CBC W/AUTO DIFF WBC: CPT

## 2019-06-25 PROCEDURE — 25000003 PHARM REV CODE 250: Performed by: COLON & RECTAL SURGERY

## 2019-06-25 PROCEDURE — B4185 PARENTERAL SOL 10 GM LIPIDS: HCPCS | Performed by: STUDENT IN AN ORGANIZED HEALTH CARE EDUCATION/TRAINING PROGRAM

## 2019-06-25 RX ORDER — SODIUM CHLORIDE 0.9 % (FLUSH) 0.9 %
10 SYRINGE (ML) INJECTION EVERY 6 HOURS
Status: DISCONTINUED | OUTPATIENT
Start: 2019-06-25 | End: 2019-06-30 | Stop reason: HOSPADM

## 2019-06-25 RX ORDER — MAGNESIUM SULFATE HEPTAHYDRATE 40 MG/ML
2 INJECTION, SOLUTION INTRAVENOUS ONCE
Status: COMPLETED | OUTPATIENT
Start: 2019-06-25 | End: 2019-06-25

## 2019-06-25 RX ORDER — SODIUM CHLORIDE 0.9 % (FLUSH) 0.9 %
10 SYRINGE (ML) INJECTION
Status: DISCONTINUED | OUTPATIENT
Start: 2019-06-25 | End: 2019-06-30 | Stop reason: HOSPADM

## 2019-06-25 RX ADMIN — Medication 10 ML: at 12:06

## 2019-06-25 RX ADMIN — FAMOTIDINE 20 MG: 10 INJECTION, SOLUTION INTRAVENOUS at 10:06

## 2019-06-25 RX ADMIN — MAGNESIUM SULFATE IN WATER 2 G: 40 INJECTION, SOLUTION INTRAVENOUS at 09:06

## 2019-06-25 RX ADMIN — FAMOTIDINE 20 MG: 10 INJECTION, SOLUTION INTRAVENOUS at 09:06

## 2019-06-25 RX ADMIN — ENOXAPARIN SODIUM 40 MG: 100 INJECTION SUBCUTANEOUS at 09:06

## 2019-06-25 RX ADMIN — SOYBEAN OIL 250 ML: 20 INJECTION, SOLUTION INTRAVENOUS at 10:06

## 2019-06-25 RX ADMIN — MUPIROCIN 1 G: 20 OINTMENT TOPICAL at 10:06

## 2019-06-25 RX ADMIN — Medication 10 ML: at 06:06

## 2019-06-25 NOTE — PROCEDURES
"Cale Antohny Sr. is a 88 y.o. male patient.    Temp: 97.9 °F (36.6 °C) (06/25/19 0817)  Pulse: 90 (06/25/19 0817)  Resp: 18 (06/25/19 0817)  BP: (!) 143/75 (06/25/19 0817)  SpO2: 96 % (06/25/19 0817)  Weight: 59.4 kg (131 lb) (06/21/19 0958)  Height: 5' 5" (165.1 cm) (06/21/19 0958)    PICC  Performed by: Maria Alejandra Cherry, RN  Assisting provider: Aby Camacho RN  Time out: Immediately prior to procedure a time out was called to verify the correct patient, procedure, equipment, support staff and site/side marked as required  Indications: med administration and vascular access  Anesthesia: local infiltration  Local anesthetic: lidocaine 1% without epinephrine  Anesthetic Total (mL): 2  Preparation: skin prepped with ChloraPrep  Skin prep agent dried: skin prep agent completely dried prior to procedure  Sterile barriers: all five maximum sterile barriers used - cap, mask, sterile gown, sterile gloves, and large sterile sheet  Hand hygiene: hand hygiene performed prior to central venous catheter insertion  Location details: right brachial  Catheter type: double lumen  Catheter size: 5 Fr  Catheter Length: 37cm    Ultrasound guidance: yes  Vessel Caliber: medium and patent, compressibility normal  Needle advanced into vessel with real time Ultrasound guidance.  Guidewire confirmed in vessel.  Image recorded and saved.  Sterile sheath used.  Number of attempts: 1  Post-procedure: blood return through all ports, chlorhexidine patch and sterile dressing applied  Technical procedures used: 3CG  Specimens: No  Implants: No  Assessment: placement verified by x-ray  Complications: none          Maria Alejandra Cherry  6/25/2019  "

## 2019-06-25 NOTE — SUBJECTIVE & OBJECTIVE
Subjective:     Interval History: Nausea overnight with emesis and frequent burping. NGT placed with over 1L output. Feeling better now. PICC/TPN ordered.    Post-Op Info:  Procedure(s) (LRB):  COLECTOMY, TOTAL, ABDOMINAL with end ileostomy (N/A)   4 Days Post-Op      Medications:  Continuous Infusions:   dextrose 5 % and 0.45 % NaCl 100 mL/hr at 06/25/19 0815    TPN ADULT CENTRAL LINE CUSTOM       Scheduled Meds:   enoxaparin  40 mg Subcutaneous Daily    famotidine (PF)  20 mg Intravenous BID    fat emulsion 20%  250 mL Intravenous Daily    mupirocin  1 g Nasal BID    sodium chloride 0.9%  10 mL Intravenous Q6H    tamsulosin  0.4 mg Oral Daily     PRN Meds:   HYDROmorphone    ibuprofen    ondansetron    oxyCODONE    simethicone    sodium chloride 0.9%        Objective:     Vital Signs (Most Recent):  Temp: 97.9 °F (36.6 °C) (06/25/19 1159)  Pulse: 85 (06/25/19 1159)  Resp: 17 (06/25/19 1159)  BP: (!) 159/74 (06/25/19 1159)  SpO2: (!) 91 % (06/25/19 1159) Vital Signs (24h Range):  Temp:  [97.7 °F (36.5 °C)-98.1 °F (36.7 °C)] 97.9 °F (36.6 °C)  Pulse:  [81-90] 85  Resp:  [16-18] 17  SpO2:  [91 %-99 %] 91 %  BP: (134-168)/(68-78) 159/74     Intake/Output - Last 3 Shifts       06/23 0700 - 06/24 0659 06/24 0700 - 06/25 0659 06/25 0700 - 06/26 0659    P.O. 200 500 240    I.V. (mL/kg) 377.5 (6.4) 1200 (20.2)     IV Piggyback 250 50     Total Intake(mL/kg) 827.5 (13.9) 1750 (29.5) 240 (4)    Urine (mL/kg/hr) 1375 (1) 875 (0.6)     Emesis/NG output  0     Drains   900    Stool 25 30     Blood  0     Total Output 1400 905 900    Net -572.5 +845 -660           Stool Occurrence 0 x      Emesis Occurrence  1 x           Physical Exam   Constitutional: He is oriented to person, place, and time. No distress.   Eyes: EOM are normal.   Neck: Neck supple.   Cardiovascular: Normal rate and regular rhythm.   Pulmonary/Chest: Effort normal. No respiratory distress.   Abdominal: Soft.   Incision c/d/i, ileostomy in  place, pink/viable, small amount of stool in bag; distended; NGT with bilious output   Musculoskeletal: Normal range of motion.   Neurological: He is alert and oriented to person, place, and time.   Skin: Skin is warm and dry.   Vitals reviewed.    Significant Labs:  CBC (Last 3 Results):   Recent Labs   Lab 06/23/19  0421 06/24/19  0500 06/25/19  0446   WBC 10.73 8.89 8.92   RBC 4.25* 3.92* 3.95*   HGB 11.5* 10.7* 10.8*   HCT 35.5* 32.3* 33.2*    224 254   MCV 84 82 84   MCH 27.1 27.3 27.3   MCHC 32.4 33.1 32.5     CMP (Last 3 Results):   Recent Labs   Lab 06/21/19  1122 06/21/19  1941  06/23/19  1232 06/24/19  0500 06/25/19  0446   GLU 90 95   < > 279* 108 229*   CALCIUM 9.4 7.7*   < > 8.9 8.7 8.9   ALBUMIN 3.5 2.6*  --   --   --   --    PROT 7.5 5.2*  --   --   --   --    * 134*   < > 130* 131* 130*   K 4.9 4.4   < > 4.2 3.9 4.1   CO2 20* 20*   < > 26 24 27    104   < > 99 97 96   BUN 10 10   < > 4* 3* 4*   CREATININE 0.8 0.6   < > 0.8 0.5 0.7   ALKPHOS 45* 36*  --   --   --   --    ALT 28 25  --   --   --   --    AST 31 22  --   --   --   --    BILITOT 0.5 0.3  --   --   --   --     < > = values in this interval not displayed.     All pertinent lab results within the last 24 hours have been reviewed.     Significant Diagnostics:  I have reviewed all pertinent imaging results/findings within the past 24 hours.

## 2019-06-25 NOTE — PLAN OF CARE
Problem: Physical Therapy Goal  Goal: Physical Therapy Goal  PT goals until 6/30/19    1. Pt supine to sit with supervision- met 6/25  2. Pt sit to supine with supervision-not met  3. Pt sit to stand with no AD with supervision-not met  4. Pt to perform gait 300ft with no AD with supervision.-not met     Outcome: Ongoing (interventions implemented as appropriate)  Patient progressing appropriately towards current goals and plan of care remains appropriate at this time.     Darrion Dallas, PT, DPT  6/25/2019  Pager #: [phone]

## 2019-06-25 NOTE — PROGRESS NOTES
[facility]  Colorectal Surgery  Progress Note    Patient Name: Cale Anthony Sr.  MRN: [id number]  Admission Date: 6/21/2019  Hospital Length of Stay: 4 days  Attending Physician: Dino Rodríguez MD    Subjective:     Interval History: Nausea overnight with emesis and frequent burping. NGT placed with over 1L output. Feeling better now. PICC/TPN ordered.    Post-Op Info:  Procedure(s) (LRB):  COLECTOMY, TOTAL, ABDOMINAL with end ileostomy (N/A)   4 Days Post-Op      Medications:  Continuous Infusions:   dextrose 5 % and 0.45 % NaCl 100 mL/hr at 06/25/19 0815    TPN ADULT CENTRAL LINE CUSTOM       Scheduled Meds:   enoxaparin  40 mg Subcutaneous Daily    famotidine (PF)  20 mg Intravenous BID    fat emulsion 20%  250 mL Intravenous Daily    mupirocin  1 g Nasal BID    sodium chloride 0.9%  10 mL Intravenous Q6H    tamsulosin  0.4 mg Oral Daily     PRN Meds:   HYDROmorphone    ibuprofen    ondansetron    oxyCODONE    simethicone    sodium chloride 0.9%        Objective:     Vital Signs (Most Recent):  Temp: 97.9 °F (36.6 °C) (06/25/19 1159)  Pulse: 85 (06/25/19 1159)  Resp: 17 (06/25/19 1159)  BP: (!) 159/74 (06/25/19 1159)  SpO2: (!) 91 % (06/25/19 1159) Vital Signs (24h Range):  Temp:  [97.7 °F (36.5 °C)-98.1 °F (36.7 °C)] 97.9 °F (36.6 °C)  Pulse:  [81-90] 85  Resp:  [16-18] 17  SpO2:  [91 %-99 %] 91 %  BP: (134-168)/(68-78) 159/74     Intake/Output - Last 3 Shifts       06/23 0700 - 06/24 0659 06/24 0700 - 06/25 0659 06/25 0700 - 06/26 0659    P.O. 200 500 240    I.V. (mL/kg) 377.5 (6.4) 1200 (20.2)     IV Piggyback 250 50     Total Intake(mL/kg) 827.5 (13.9) 1750 (29.5) 240 (4)    Urine (mL/kg/hr) 1375 (1) 875 (0.6)     Emesis/NG output  0     Drains   900    Stool 25 30     Blood  0     Total Output 1400 905 900    Net -572.5 +845 -660           Stool Occurrence 0 x      Emesis Occurrence  1 x           Physical Exam   Constitutional: He is oriented to person, place, and time. No  distress.   Eyes: EOM are normal.   Neck: Neck supple.   Cardiovascular: Normal rate and regular rhythm.   Pulmonary/Chest: Effort normal. No respiratory distress.   Abdominal: Soft.   Incision c/d/i, ileostomy in place, pink/viable, small amount of stool in bag; distended; NGT with bilious output   Musculoskeletal: Normal range of motion.   Neurological: He is alert and oriented to person, place, and time.   Skin: Skin is warm and dry.   Vitals reviewed.    Significant Labs:  CBC (Last 3 Results):   Recent Labs   Lab 06/23/19  0421 06/24/19  0500 06/25/19  0446   WBC 10.73 8.89 8.92   RBC 4.25* 3.92* 3.95*   HGB 11.5* 10.7* 10.8*   HCT 35.5* 32.3* 33.2*    224 254   MCV 84 82 84   MCH 27.1 27.3 27.3   MCHC 32.4 33.1 32.5     CMP (Last 3 Results):   Recent Labs   Lab 06/21/19  1122 06/21/19  1941  06/23/19  1232 06/24/19  0500 06/25/19  0446   GLU 90 95   < > 279* 108 229*   CALCIUM 9.4 7.7*   < > 8.9 8.7 8.9   ALBUMIN 3.5 2.6*  --   --   --   --    PROT 7.5 5.2*  --   --   --   --    * 134*   < > 130* 131* 130*   K 4.9 4.4   < > 4.2 3.9 4.1   CO2 20* 20*   < > 26 24 27    104   < > 99 97 96   BUN 10 10   < > 4* 3* 4*   CREATININE 0.8 0.6   < > 0.8 0.5 0.7   ALKPHOS 45* 36*  --   --   --   --    ALT 28 25  --   --   --   --    AST 31 22  --   --   --   --    BILITOT 0.5 0.3  --   --   --   --     < > = values in this interval not displayed.     All pertinent lab results within the last 24 hours have been reviewed.     Significant Diagnostics:  I have reviewed all pertinent imaging results/findings within the past 24 hours.    Assessment/Plan:     * Large bowel obstruction  Cale Gullage Sr. is a 88 y.o. male with LBO secondary to sigmoid colon mass with cecal pneumatosis on CT scan s/p exlap, TAC w/ EI on 6/21/19     -NPO   - NGT with bilious output  -MIVFs at 100; dC at 10pm when fluids start  - TPN tonight  -WOCN for new ileostomy  -OOB, PT, IS, ok for lovenox    Dispo: Awaiting resolution of  ileus          W Bandar Frey MD  Colorectal Surgery  [facility]

## 2019-06-25 NOTE — PT/OT/SLP PROGRESS
Physical Therapy Treatment    Patient Name:  Cale Anthony Sr.   MRN:  [id number]    Recommendations:     Discharge Recommendations:  home health PT   Discharge Equipment Recommendations: none   Barriers to discharge: increased instability from baseline    Assessment:     Cale Anthony Sr. is a 88 y.o. male admitted with a medical diagnosis of Large bowel obstruction.  He presents with the following impairments/functional limitations:  impaired functional mobilty, impaired balance, impaired self care skills, weakness, gait instability.  Patient progressing well towards goals with less drowsiness and improved overall stability today from previous session.  Patient was safe without an assistive device today without trial of walker needed.  Patient remains appropriate for balance and safety training.    Rehab Prognosis: Good; patient would benefit from acute skilled PT services to address these deficits and reach maximum level of function.  Patient remains appropriate for  PT upon discharge to increase home safety and independence.   Recent Surgery: Procedure(s) (LRB):  COLECTOMY, TOTAL, ABDOMINAL with end ileostomy (N/A) 4 Days Post-Op    Plan:     During this hospitalization, patient to be seen 4 x/week to address the identified rehab impairments via gait training, therapeutic activities, therapeutic exercises, neuromuscular re-education and progress toward the following goals:    · Plan of Care Expires:  07/22/19    Subjective     Chief Complaint: I have continued to feel nauseated.  Pain/Comfort:  · Pain Rating 1: 0/10  · Pain Rating Post-Intervention 1: 0/10      Objective:     Communicated with RN prior to session.  Patient found supine with peripheral IV, telemetry upon PT entry to room reporting plan for placement of NG tube later in day.    General Precautions: Standard, fall   Orthopedic Precautions:N/A   Braces: N/A     Functional Mobility:  · Bed Mobility:     · Supine to Sit: supervision  · Transfers:      · Sit to Stand:  contact guard assistance with no AD  · Gait: Patient ambulated 250 ft with intermittent CGA vs SBA without an assistive device      AM-PAC 6 CLICK MOBILITY  Turning over in bed (including adjusting bedclothes, sheets and blankets)?: 4  Sitting down on and standing up from a chair with arms (e.g., wheelchair, bedside commode, etc.): 3  Moving from lying on back to sitting on the side of the bed?: 3  Moving to and from a bed to a chair (including a wheelchair)?: 3  Need to walk in hospital room?: 3  Climbing 3-5 steps with a railing?: 3  Basic Mobility Total Score: 19       Therapeutic Activities and Exercises:   Gait:  Patient with improved balance and stability but still with some path deviations and benefits from cues for upright posture and to widen base of support with turning.     Patient Education:    Patient and Family member educated on home safety and Fall risk by explanation and demonstration.  Patient was receptive to education and verbalizes understanding.    Patient left up in chair with all lines intact, RN notified and family present..    GOALS:   Multidisciplinary Problems     Physical Therapy Goals        Problem: Physical Therapy Goal    Goal Priority Disciplines Outcome Goal Variances Interventions   Physical Therapy Goal     PT, PT/OT Ongoing (interventions implemented as appropriate)     Description:  PT goals until 6/30/19    1. Pt supine to sit with supervision- met 6/25  2. Pt sit to supine with supervision-not met  3. Pt sit to stand with no AD with supervision-not met  4. Pt to perform gait 300ft with no AD with supervision.-not met                       Time Tracking:     PT Received On: 06/25/19  PT Start Time: 0751     PT Stop Time: 0808  PT Total Time (min): 17 min     Billable Minutes: Gait Training 17 min    Treatment Type: Treatment  PT/PTA: PT           Darrion Dallas, PT  06/25/2019

## 2019-06-25 NOTE — PLAN OF CARE
Problem: Adult Inpatient Plan of Care  Goal: Plan of Care Review  Outcome: Ongoing (interventions implemented as appropriate)  POC discussed with pt. And verbalized understanding. AAOx4, VSS on RA and telemetry in place with NSR. Abd midline with a tefla dressing and dried drainage. R ileostomy in place and draining thin brown liquid; voids via the urinal. Clear liquids tolerated well, and pain managed with PRN medications per current MAR. Pt had one episode of emesis during the shift, and reported relief of nausea after words.  Remains free of falls and injury. Safety precautions maintained, call light within reach, will continue to monitor.

## 2019-06-25 NOTE — ASSESSMENT & PLAN NOTE
Cale Anthony . is a 88 y.o. male with LBO secondary to sigmoid colon mass with cecal pneumatosis on CT scan s/p exlap, TAC w/ EI on 6/21/19     -NPO   - NGT with bilious output  -MIVFs at 100; dC at 10pm when fluids start  - TPN tonight  -WOCN for new ileostomy  -OOB, PT, IS, ok for lovenox    Dispo: Awaiting resolution of ileus

## 2019-06-25 NOTE — NURSING
Patient seen for new ostomy consult. Initial ostomy education begun.  Goals of education include:     Pouch emptying, sizing/cuting pouch, and applying pouch   Stoma and jakob-stomal care   Food choices and hydration   Obtaining supplies    Discussed and demonstrated cutting ostomy pouch and emptying pouch of stool and gas.  P  Discussed meal planning with particular foods to avoid.  Discussed importance of hydration and increasing fluid intake.  Explained process of ordering supplies to be delivered to patients home. Provided reading materials regarding todays training and will follow up with patient tomorrow.    Patient more alert and talkative today. Daughter at bedside and very attentive to learning ostomy care. Daughter states patient has big family and will have support with ostomy care at home. Patient will likely require some assistance but appears to have very strong support system.     Demonstrated opening and closing of pouch and how to empty. Discussed that patient should try to focus on emptying pouch while here in hospital. Patient 's daughter has worked in healthcare and is familiar with ostomy appliances. Demonstrated measuring the stoma and applying new pouch. Stoma is well budded and pink with peristomal skin intact.     Ostomy will continue to follow.   Jessy Sepulveda RN CN [facility]   [phone]

## 2019-06-25 NOTE — CONSULTS
Double lumen placed in right brachial vein, 37cm in length and 0cm exposed. Arm circumference 28cm. Lot# [id number].

## 2019-06-25 NOTE — PLAN OF CARE
Problem: Adult Inpatient Plan of Care  Goal: Plan of Care Review  Outcome: Ongoing (interventions implemented as appropriate)  POC reviewed with pt and family at bedside, all questions and concerns addressed. VSS on RA. NPO. R nare NG tube placed today to LIWS with large amount of green output. Voids per urinal with adequate UOP. RQ ostomy intact with scant output. R PICC placed today, TPN to be initiated tonight. Pt worked with with PT today and ambulated in the hallways. No complaints of pain. Tele monitored running NSR. Pt resting with call light in reach and son at bedside. Will continue to monitor.

## 2019-06-26 PROBLEM — E43 SEVERE MALNUTRITION: Status: ACTIVE | Noted: 2019-06-26

## 2019-06-26 LAB
ANION GAP SERPL CALC-SCNC: 8 MMOL/L (ref 8–16)
BASOPHILS # BLD AUTO: 0.03 K/UL (ref 0–0.2)
BASOPHILS NFR BLD: 0.4 % (ref 0–1.9)
BUN SERPL-MCNC: 6 MG/DL (ref 8–23)
CALCIUM SERPL-MCNC: 8.9 MG/DL (ref 8.7–10.5)
CHLORIDE SERPL-SCNC: 97 MMOL/L (ref 95–110)
CO2 SERPL-SCNC: 29 MMOL/L (ref 23–29)
CREAT SERPL-MCNC: 0.6 MG/DL (ref 0.5–1.4)
DIFFERENTIAL METHOD: ABNORMAL
EOSINOPHIL # BLD AUTO: 0.2 K/UL (ref 0–0.5)
EOSINOPHIL NFR BLD: 2.2 % (ref 0–8)
ERYTHROCYTE [DISTWIDTH] IN BLOOD BY AUTOMATED COUNT: 14.2 % (ref 11.5–14.5)
EST. GFR  (AFRICAN AMERICAN): >60 ML/MIN/1.73 M^2
EST. GFR  (NON AFRICAN AMERICAN): >60 ML/MIN/1.73 M^2
GLUCOSE SERPL-MCNC: 105 MG/DL (ref 70–110)
HCT VFR BLD AUTO: 32.1 % (ref 40–54)
HGB BLD-MCNC: 10.5 G/DL (ref 14–18)
IMM GRANULOCYTES # BLD AUTO: 0.03 K/UL (ref 0–0.04)
IMM GRANULOCYTES NFR BLD AUTO: 0.4 % (ref 0–0.5)
LYMPHOCYTES # BLD AUTO: 1.2 K/UL (ref 1–4.8)
LYMPHOCYTES NFR BLD: 15.1 % (ref 18–48)
MAGNESIUM SERPL-MCNC: 2 MG/DL (ref 1.6–2.6)
MCH RBC QN AUTO: 27.2 PG (ref 27–31)
MCHC RBC AUTO-ENTMCNC: 32.7 G/DL (ref 32–36)
MCV RBC AUTO: 83 FL (ref 82–98)
MONOCYTES # BLD AUTO: 0.5 K/UL (ref 0.3–1)
MONOCYTES NFR BLD: 5.5 % (ref 4–15)
NEUTROPHILS # BLD AUTO: 6.2 K/UL (ref 1.8–7.7)
NEUTROPHILS NFR BLD: 76.4 % (ref 38–73)
NRBC BLD-RTO: 0 /100 WBC
PHOSPHATE SERPL-MCNC: 3.1 MG/DL (ref 2.7–4.5)
PLATELET # BLD AUTO: 278 K/UL (ref 150–350)
PMV BLD AUTO: 10.3 FL (ref 9.2–12.9)
POTASSIUM SERPL-SCNC: 3.5 MMOL/L (ref 3.5–5.1)
RBC # BLD AUTO: 3.86 M/UL (ref 4.6–6.2)
SODIUM SERPL-SCNC: 134 MMOL/L (ref 136–145)
WBC # BLD AUTO: 8.16 K/UL (ref 3.9–12.7)

## 2019-06-26 PROCEDURE — 25000003 PHARM REV CODE 250: Performed by: STUDENT IN AN ORGANIZED HEALTH CARE EDUCATION/TRAINING PROGRAM

## 2019-06-26 PROCEDURE — 85025 COMPLETE CBC W/AUTO DIFF WBC: CPT

## 2019-06-26 PROCEDURE — 83735 ASSAY OF MAGNESIUM: CPT

## 2019-06-26 PROCEDURE — 25000003 PHARM REV CODE 250: Performed by: COLON & RECTAL SURGERY

## 2019-06-26 PROCEDURE — 94761 N-INVAS EAR/PLS OXIMETRY MLT: CPT

## 2019-06-26 PROCEDURE — 63600175 PHARM REV CODE 636 W HCPCS: Performed by: STUDENT IN AN ORGANIZED HEALTH CARE EDUCATION/TRAINING PROGRAM

## 2019-06-26 PROCEDURE — S0028 INJECTION, FAMOTIDINE, 20 MG: HCPCS | Performed by: STUDENT IN AN ORGANIZED HEALTH CARE EDUCATION/TRAINING PROGRAM

## 2019-06-26 PROCEDURE — A4216 STERILE WATER/SALINE, 10 ML: HCPCS | Performed by: COLON & RECTAL SURGERY

## 2019-06-26 PROCEDURE — B4185 PARENTERAL SOL 10 GM LIPIDS: HCPCS | Performed by: STUDENT IN AN ORGANIZED HEALTH CARE EDUCATION/TRAINING PROGRAM

## 2019-06-26 PROCEDURE — 84100 ASSAY OF PHOSPHORUS: CPT

## 2019-06-26 PROCEDURE — 80048 BASIC METABOLIC PNL TOTAL CA: CPT

## 2019-06-26 PROCEDURE — 97110 THERAPEUTIC EXERCISES: CPT

## 2019-06-26 PROCEDURE — 20600001 HC STEP DOWN PRIVATE ROOM

## 2019-06-26 RX ADMIN — Medication 10 ML: at 12:06

## 2019-06-26 RX ADMIN — MUPIROCIN 1 G: 20 OINTMENT TOPICAL at 09:06

## 2019-06-26 RX ADMIN — FAMOTIDINE 20 MG: 10 INJECTION, SOLUTION INTRAVENOUS at 09:06

## 2019-06-26 RX ADMIN — ENOXAPARIN SODIUM 40 MG: 100 INJECTION SUBCUTANEOUS at 09:06

## 2019-06-26 RX ADMIN — SOYBEAN OIL 250 ML: 20 INJECTION, SOLUTION INTRAVENOUS at 09:06

## 2019-06-26 RX ADMIN — Medication 10 ML: at 06:06

## 2019-06-26 NOTE — ASSESSMENT & PLAN NOTE
Malnutrition in the context of Chronic Illness/Injury    Related to (etiology):  Inadequate Energy Intake    Signs and Symptoms (as evidenced by):  Energy Intake: <50% of estimated energy requirement for 1 month  Body Fat Depletion: severe depletion of orbitals and triceps   Muscle Mass Depletion: severe depletion of temples, clavicle region and lower extremities   Weight Loss: 28% x 1 year     Interventions:  Collaboration and Referral of Nutrition Care  Parenteral Nutrition    Nutrition Diagnosis Status:  New

## 2019-06-26 NOTE — PROGRESS NOTES
[facility]  Colorectal Surgery  Progress Note    Patient Name: Cale Anthony Sr.  MRN: [id number]  Admission Date: 6/21/2019  Hospital Length of Stay: 5 days  Attending Physician: Dino Rodríguez MD    Subjective:     Interval History: NGT placed yesterday w/ 1.4L OP, denies any nausea this am, +ostomy OP, pain well controlled, PICC placed and started on TPN    Post-Op Info:  Procedure(s) (LRB):  COLECTOMY, TOTAL, ABDOMINAL with end ileostomy (N/A)   5 Days Post-Op      Medications:  Continuous Infusions:   TPN ADULT CENTRAL LINE CUSTOM 75 mL/hr at 06/25/19 2202     Scheduled Meds:   enoxaparin  40 mg Subcutaneous Daily    famotidine (PF)  20 mg Intravenous BID    mupirocin  1 g Nasal BID    sodium chloride 0.9%  10 mL Intravenous Q6H    tamsulosin  0.4 mg Oral Daily     PRN Meds:   HYDROmorphone    ibuprofen    ondansetron    oxyCODONE    simethicone    sodium chloride 0.9%        Objective:     Vital Signs (Most Recent):  Temp: 98 °F (36.7 °C) (06/26/19 0807)  Pulse: 80 (06/26/19 0807)  Resp: (!) 28 (06/26/19 0807)  BP: (!) 152/69 (06/26/19 0807)  SpO2: 97 % (06/26/19 0807) Vital Signs (24h Range):  Temp:  [97.3 °F (36.3 °C)-98.6 °F (37 °C)] 98 °F (36.7 °C)  Pulse:  [75-86] 80  Resp:  [16-28] 28  SpO2:  [91 %-98 %] 97 %  BP: (125-159)/(57-77) 152/69     Intake/Output - Last 3 Shifts       06/24 0700 - 06/25 0659 06/25 0700 - 06/26 0659 06/26 0700 - 06/27 0659    P.O. 500 240     I.V. (mL/kg) 1200 (20.2) 1679.2 (28.3)     IV Piggyback 50      TPN  709.3     Total Intake(mL/kg) 1750 (29.5) 2628.4 (44.2)     Urine (mL/kg/hr) 875 (0.6) 1000 (0.7)     Emesis/NG output 0      Drains  1550     Stool 30 50     Blood 0      Total Output 905 2600     Net +845 +28.4            Urine Occurrence  3 x     Stool Occurrence  0 x     Emesis Occurrence 1 x            Physical Exam   Constitutional: He is oriented to person, place, and time. No distress.   Eyes: EOM are normal.   Neck: Neck supple.    Cardiovascular: Normal rate and regular rhythm.   Pulmonary/Chest: Effort normal. No respiratory distress.   Abdominal: Soft.   Mod distention, ileostomy in place, pink/viable, stool in bag   Musculoskeletal: Normal range of motion.   Neurological: He is alert and oriented to person, place, and time.   Skin: Skin is warm and dry.     Significant Labs:  BMP (Last 3 Results):   Recent Labs   Lab 06/24/19  0500 06/25/19  0446 06/26/19  0332    229* 105   * 130* 134*   K 3.9 4.1 3.5   CL 97 96 97   CO2 24 27 29   BUN 3* 4* 6*   CREATININE 0.5 0.7 0.6   CALCIUM 8.7 8.9 8.9   MG 1.7 1.7 2.0     CBC (Last 3 Results):   Recent Labs   Lab 06/24/19  0500 06/25/19  0446 06/26/19  0332   WBC 8.89 8.92 8.16   RBC 3.92* 3.95* 3.86*   HGB 10.7* 10.8* 10.5*   HCT 32.3* 33.2* 32.1*    254 278   MCV 82 84 83   MCH 27.3 27.3 27.2   MCHC 33.1 32.5 32.7       Significant Diagnostics:  I have reviewed all pertinent imaging results/findings within the past 24 hours.    Assessment/Plan:     * Large bowel obstruction  Cale Anthony Sr. is a 88 y.o. male with LBO secondary to sigmoid colon mass with cecal pneumatosis on CT scan s/p exlap, TAC w/ EI on 6/21/19     -cont NPO, NGT  -cont TPN  -WOCN for new ileostomy  -OOB, PT, IS, lovenox    Dispo: Awaiting resolution of ileus          Lobito Mccloud MD  Colorectal Surgery  [facility]

## 2019-06-26 NOTE — SUBJECTIVE & OBJECTIVE
Subjective:     Interval History: NGT placed yesterday w/ 1.4L OP, denies any nausea this am, +ostomy OP, pain well controlled, PICC placed and started on TPN    Post-Op Info:  Procedure(s) (LRB):  COLECTOMY, TOTAL, ABDOMINAL with end ileostomy (N/A)   5 Days Post-Op      Medications:  Continuous Infusions:   TPN ADULT CENTRAL LINE CUSTOM 75 mL/hr at 06/25/19 2202     Scheduled Meds:   enoxaparin  40 mg Subcutaneous Daily    famotidine (PF)  20 mg Intravenous BID    mupirocin  1 g Nasal BID    sodium chloride 0.9%  10 mL Intravenous Q6H    tamsulosin  0.4 mg Oral Daily     PRN Meds:   HYDROmorphone    ibuprofen    ondansetron    oxyCODONE    simethicone    sodium chloride 0.9%        Objective:     Vital Signs (Most Recent):  Temp: 98 °F (36.7 °C) (06/26/19 0807)  Pulse: 80 (06/26/19 0807)  Resp: (!) 28 (06/26/19 0807)  BP: (!) 152/69 (06/26/19 0807)  SpO2: 97 % (06/26/19 0807) Vital Signs (24h Range):  Temp:  [97.3 °F (36.3 °C)-98.6 °F (37 °C)] 98 °F (36.7 °C)  Pulse:  [75-86] 80  Resp:  [16-28] 28  SpO2:  [91 %-98 %] 97 %  BP: (125-159)/(57-77) 152/69     Intake/Output - Last 3 Shifts       06/24 0700 - 06/25 0659 06/25 0700 - 06/26 0659 06/26 0700 - 06/27 0659    P.O. 500 240     I.V. (mL/kg) 1200 (20.2) 1679.2 (28.3)     IV Piggyback 50      TPN  709.3     Total Intake(mL/kg) 1750 (29.5) 2628.4 (44.2)     Urine (mL/kg/hr) 875 (0.6) 1000 (0.7)     Emesis/NG output 0      Drains  1550     Stool 30 50     Blood 0      Total Output 905 2600     Net +845 +28.4            Urine Occurrence  3 x     Stool Occurrence  0 x     Emesis Occurrence 1 x            Physical Exam   Constitutional: He is oriented to person, place, and time. No distress.   Eyes: EOM are normal.   Neck: Neck supple.   Cardiovascular: Normal rate and regular rhythm.   Pulmonary/Chest: Effort normal. No respiratory distress.   Abdominal: Soft.   Mod distention, ileostomy in place, pink/viable, stool in bag   Musculoskeletal: Normal  range of motion.   Neurological: He is alert and oriented to person, place, and time.   Skin: Skin is warm and dry.     Significant Labs:  BMP (Last 3 Results):   Recent Labs   Lab 06/24/19  0500 06/25/19  0446 06/26/19  0332    229* 105   * 130* 134*   K 3.9 4.1 3.5   CL 97 96 97   CO2 24 27 29   BUN 3* 4* 6*   CREATININE 0.5 0.7 0.6   CALCIUM 8.7 8.9 8.9   MG 1.7 1.7 2.0     CBC (Last 3 Results):   Recent Labs   Lab 06/24/19  0500 06/25/19 0446 06/26/19  0332   WBC 8.89 8.92 8.16   RBC 3.92* 3.95* 3.86*   HGB 10.7* 10.8* 10.5*   HCT 32.3* 33.2* 32.1*    254 278   MCV 82 84 83   MCH 27.3 27.3 27.2   MCHC 33.1 32.5 32.7       Significant Diagnostics:  I have reviewed all pertinent imaging results/findings within the past 24 hours.

## 2019-06-26 NOTE — PLAN OF CARE
Problem: Adult Inpatient Plan of Care  Goal: Plan of Care Review  Outcome: Ongoing (interventions implemented as appropriate)  AAO x 4. ML abd incision with DB. RLQ ileostomy. R nare NG tube. Pt voids in urinal and ambulated with stand by assistance. NPO, no complaints of N/V overnight. VSS on RA. On tele running NSR. No complaints of pain overnight. Pt remained free of falls overnight. POC reviewed with pt who verbalized understanding. Bed in low position, call light in reach. No signs of distress or concerns noted at this time. WCTM.

## 2019-06-26 NOTE — PHYSICIAN QUERY
PT Name: Cale Anthony Sr.  MR #: [id number]     Physician Query Form - Documentation Clarification      CDS/: Nicky Guzman RN, CDS               Contact information: [email or web address]                                                                                                                         [phone]    This form is a permanent document in the medical record.     Query Date: June 26, 2019    By submitting this query, we are merely seeking further clarification of documentation. Please utilize your independent clinical judgment when addressing the question(s) below.    The Medical record reflects the following:    Supporting Clinical Findings Location in Medical Record   OPERATIVE FINDINGS:  Mass in the distal sigmoid colon causing complete proximal colonic obstruction and significant colonic distention with areas of patchy   ischemia in the cecum necessitating a total abdominal colectomy.   Op Note 6/21   The colon was noted to be markedly distended distally with a distal extension of the distention tapering down to a point in the distal sigmoid colon where the mass could be palpated   within the lumen of the colon.  Distal to this, the distal sigmoid colon and rectum appeared decompressed.  There were areas on the cecum and the ascending colon where the bowel was markedly distended where there was suspicion of   ischemic changes in the wall of the colon.  Given this as well as the findings of pneumatosis on the preoperative CT scan, we elected to proceed with a total abdominal colectomy.   Op Note 6/21                                                                            Doctor, please further specify the diagnosis of __patchy ischemia__    Provider Use Only    [ x] Acute bowel ischemia, please further specify as           [ x  ] Focal              [   ] Diffuse             [   ] Other, please specify ___________          [   ] Unable to determine extent     [   ] Chronic bowel  ischemia     [ x ] Other intestinal diagnosis, please specify _Large Bowel Obstruction______________________                                                                                                           [  ] Clinically Undetermined

## 2019-06-26 NOTE — PT/OT/SLP PROGRESS
Occupational Therapy   Treatment    Name: Cale Anthony Sr.  MRN: [id number]  Admitting Diagnosis:  Large bowel obstruction  5 Days Post-Op    Recommendations:     Discharge Recommendations: home with home health  Discharge Equipment Recommendations:  none  Barriers to discharge:  None    Assessment:     Cale Anthony Sr. is a 88 y.o. male with a medical diagnosis of Large bowel obstruction.  He presents with impairments listed below. Pt did well to participate and tolerate session. Pt presents w/ impaired balance w/ ambulation, making home an overall safety and fall risk. Pt progressing towards goals. Pt displayed global deconditioning requiring increased assist for ADLs and mobility at this time. Pt would benefit from skilled OT services to improve independence and overall occupational functioning.     Performance deficits affecting function are impaired endurance, impaired self care skills, impaired functional mobilty, weakness, gait instability, impaired balance, impaired cardiopulmonary response to activity, decreased safety awareness.     Rehab Prognosis:  Good; patient would benefit from acute skilled OT services to address these deficits and reach maximum level of function.       Plan:     Patient to be seen 2 x/week to address the above listed problems via self-care/home management, therapeutic activities, therapeutic exercises  · Plan of Care Expires: 07/23/19  · Plan of Care Reviewed with: patient, son    Subjective     Pain/Comfort:  · Pain Rating 1: 0/10  · Pain Rating Post-Intervention 1: 0/10    Objective:     Communicated with: RN prior to session.  Patient found up in chair with telemetry, peripheral IV upon OT entry to room.    General Precautions: Standard, fall   Orthopedic Precautions:N/A   Braces: N/A     Occupational Performance:     Functional Mobility/Transfers:  · Patient completed Sit <> Stand Transfer with contact guard assistance  with  no assistive device   · Functional Mobility: Pt  ambulated ~350 ft at [facility] to min a w/ HHA w/o AD. Pt required increased assist as ambulation prolonged.     Activities of Daily Living:  · Upper Body Dressing: minimum assistance donned gown in standing       [facility] 6 Click ADL: 18    Treatment & Education:  Pt and pt's family were educated on POC.     Patient left up in chair with all lines intact, call button in reach and Family presentEducation:      GOALS:   Multidisciplinary Problems     Occupational Therapy Goals        Problem: Occupational Therapy Goal    Goal Priority Disciplines Outcome Interventions   Occupational Therapy Goal     OT, PT/OT Ongoing (interventions implemented as appropriate)    Description:  Goals to be met by: 7/1/19     Patient will increase functional independence with ADLs by performing:    UE Dressing with Modified [place].  LE Dressing with Modified [place].  Grooming while standing with Modified [place].  Toileting from toilet with Modified [place] for hygiene and clothing management.   Rolling to Bilateral with Modified [place].   Supine to sit with Modified [place].  Step transfer with Modified [place]  Toilet transfer to toilet with Modified [place].                      Time Tracking:     OT Date of Treatment: 06/26/19  OT Start Time: 1450  OT Stop Time: 1500  OT Total Time (min): 10 min    Billable Minutes:Therapeutic Exercise 8 minutes    Jose Gonzalez, OT  6/26/2019

## 2019-06-26 NOTE — NURSING
Called to room to find NG tube dislodged around 0900. After re-advancing, paged MD to inquire about ordering KUB. Paged 3 more times, still with no response. Reordered KUB, still no response from MD.

## 2019-06-26 NOTE — PROGRESS NOTES
Ostomy care follow up:  Pt sitting up in chair with son at bedside.  Pt and son report no concerns or questions regarding ostomy care at this time.  Son states he relates to pt stating he is familiar with ostomy care because he had an ostomy himself about 2 years ago. Ostomy pouch is intact with no leaks with liquid brown/green output.  Pt on clear liquid diet today.  Will follow up with pt tomorrow. [id number]

## 2019-06-26 NOTE — PLAN OF CARE
Problem: Occupational Therapy Goal  Goal: Occupational Therapy Goal  Goals to be met by: 7/1/19     Patient will increase functional independence with ADLs by performing:    UE Dressing with Modified [place].  LE Dressing with Modified [place].  Grooming while standing with Modified [place].  Toileting from toilet with Modified [place] for hygiene and clothing management.   Rolling to Bilateral with Modified [place].   Supine to sit with Modified [place].  Step transfer with Modified [place]  Toilet transfer to toilet with Modified [place].     Outcome: Ongoing (interventions implemented as appropriate)  Continue OT POC     Comments: Jose Gonzalez OTR/L  6/26/2019

## 2019-06-26 NOTE — PROGRESS NOTES
"[facility]  Adult Nutrition  Progress Note    SUMMARY       Recommendations    Recommendation/Intervention:     Pt with severe malnutrition.     1. To better meet needs, recommend Custom TPN 90 gm AA / 270 gm Dex + IV lipids to provide 1778 kcal, 90 gm protein, and GIR 3.2.   2. As medically able, ADAT to Low Fiber/Residue.   3. RD following.     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 NPO, NG tube, TPN + IV lipids infusing. Pt reports very poor appetite for 1 month PTA 2/2 stomach issues. States that sometimes he would not eat for days. Per MD note, ~60lb wt loss in 1 year. NFPE completed. Pt with severe muscle/fat wasting. Pt with severe malnutrition.  Nutrition Discharge Planning: unable to determine at this time    Nutrition Risk Screen    Nutrition Risk Screen: dysphagia or difficulty swallowing    Nutrition/Diet History    Spiritual, Cultural Beliefs, Yazidi Practices, Values that Affect Care: no  Factors Affecting Nutritional Intake: altered gastrointestinal function, NPO    Anthropometrics    Temp: 96.9 °F (36.1 °C)  Height Method: Stated  Height: 5' 5" (165.1 cm)  Height (inches): 65 in  Weight Method: Stated  Weight: 59.4 kg (131 lb)  Weight (lb): 131 lb  Ideal Body Weight (IBW), Male: 136 lb  % Ideal Body Weight, Male (lb): 96.32 lb  BMI (Calculated): 21.8  BMI Grade: (P) 18.5-24.9 - normal  Weight Loss: (P) unintentional  Usual Body Weight (UBW), kg: (P) 82.27 kg(~60 lb wt loss in 1 year per MD note)  % Usual Body Weight: (P) 72.38  % Weight Change From Usual Weight: (P) -27.77 %     Lab/Procedures/Meds    Pertinent Labs Reviewed: reviewed  Pertinent Labs Comments: Na 134, BUN " 6  Pertinent Medications Reviewed: reviewed    Estimated/Assessed Needs    Weight Used For Calorie Calculations: 59.4 kg (130 lb 15.3 oz)  Energy Calorie Requirements (kcal): 1782 kcal/day  Energy Need Method: Kcal/kg(30)  Protein Requirements: 77-89 gm/day(1.3-1.5 gm/kg)  Weight Used For Protein Calculations: 59.4 kg (130 lb 15.3 oz)  Fluid Requirements (mL): 1 mL/kcal or per MD     RDA Method (mL): 1782     Nutrition Prescription Ordered    Current Diet Order: NPO  Current Nutrition Support Formula Ordered: (Custom TPN 90 gm AA / 180 gm Dex + IV lipids)  Current Nutrition Support Rate Ordered: 75 (ml)  Current Nutrition Support Frequency Ordered: mL/hr    Evaluation of Received Nutrient/Fluid Intake    Parenteral Calories (kcal): 972  Parenteral Protein (gm): 90  Parenteral Fluid (mL): 1800  Lipid Calories (kcals): 500 kcals  GIR (Glucose Infusion Rate) (mg/kg/min): 2.1 mg/kg/min  Total Calories (kcal): 1478  % Kcal Needs: 82  % Protein Needs: 113  I/O: +1.5L since admit  Energy Calories Required: not meeting needs  Protein Required: meeting needs  Fluid Required: (per MD)  Comments: LBM 6/25(ostomy)  Tolerance: tolerating  % Intake of Estimated Energy Needs: 75 - 100 %  % Meal Intake: NPO    Nutrition Risk    Level of Risk/Frequency of Follow-up: (f/u 1 x wk)     Assessment and Plan    Severe malnutrition  Malnutrition in the context of Chronic Illness/Injury    Related to (etiology):  Inadequate Energy Intake    Signs and Symptoms (as evidenced by):  Energy Intake: <50% of estimated energy requirement for 1 month  Body Fat Depletion: severe depletion of orbitals and triceps   Muscle Mass Depletion: severe depletion of temples, clavicle region and lower extremities   Weight Loss: 28% x 1 year     Interventions:  Collaboration and Referral of Nutrition Care  Parenteral Nutrition    Nutrition Diagnosis Status:  New           Monitor and Evaluation    Food and Nutrient Intake: energy intake, parenteral nutrition  intake  Food and Nutrient Adminstration: enteral and parenteral nutrition administration  Anthropometric Measurements: body mass index, weight change, weight  Biochemical Data, Medical Tests and Procedures: electrolyte and renal panel, gastrointestinal profile, glucose/endocrine profile, lipid profile, inflammatory profile  Nutrition-Focused Physical Findings: overall appearance     Malnutrition Assessment  Malnutrition Type: chronic illness          Weight Loss (Malnutrition): (28% x 1 year)  Energy Intake (Malnutrition): less than or equal to 50% for greater than or equal to 1 month   Orbital Region (Subcutaneous Fat Loss): severe depletion  Upper Arm Region (Subcutaneous Fat Loss): severe depletion   [place] Region (Muscle Loss): severe depletion  Clavicle Bone Region (Muscle Loss): severe depletion  Clavicle and Acromion Bone Region (Muscle Loss): severe depletion  Patellar Region (Muscle Loss): severe depletion  Anterior Thigh Region (Muscle Loss): severe depletion  Posterior Calf Region (Muscle Loss): (RYLIE)                 Nutrition Follow-Up    RD Follow-up?: Yes

## 2019-06-26 NOTE — PLAN OF CARE
Problem: Adult Inpatient Plan of Care  Goal: Plan of Care Review  Outcome: Ongoing (interventions implemented as appropriate)  POC reviewed with pt and family at bedside, all questions and concerns addressed. VSS on RA. NPO. TPN and IVF infusing through R PICC per MAR. Voids per urinal with adequate UOP. R ostomy intact with small amount of output. R nare NG tube re inserted and to LIWS, + output. Ambulated in hallways with therapy today. Tele monitored running NSR. No complaints of pain. Pt resting with call light in reach and family at bedside. Will continue to monitor.

## 2019-06-26 NOTE — PLAN OF CARE
Problem: Adult Inpatient Plan of Care  Goal: Plan of Care Review      Recommendations    Recommendation/Intervention:     Pt with severe malnutrition.     1. To better meet needs, recommend Custom TPN 90 gm AA / 270 gm Dex + IV lipids to provide 1778 kcal, 90 gm protein, and GIR 3.2.   2. As medically able, ADAT to Low Fiber/Residue.   3. RD following.     Goals: meet >85% EEN/EPN  Nutrition Goal Status: new

## 2019-06-26 NOTE — ASSESSMENT & PLAN NOTE
Cale Anthony . is a 88 y.o. 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

## 2019-06-27 LAB
ANION GAP SERPL CALC-SCNC: 6 MMOL/L (ref 8–16)
BASOPHILS # BLD AUTO: 0.04 K/UL (ref 0–0.2)
BASOPHILS NFR BLD: 0.4 % (ref 0–1.9)
BUN SERPL-MCNC: 7 MG/DL (ref 8–23)
CALCIUM SERPL-MCNC: 8.6 MG/DL (ref 8.7–10.5)
CHLORIDE SERPL-SCNC: 98 MMOL/L (ref 95–110)
CO2 SERPL-SCNC: 29 MMOL/L (ref 23–29)
CREAT SERPL-MCNC: 0.6 MG/DL (ref 0.5–1.4)
DIFFERENTIAL METHOD: ABNORMAL
EOSINOPHIL # BLD AUTO: 0.3 K/UL (ref 0–0.5)
EOSINOPHIL NFR BLD: 2.8 % (ref 0–8)
ERYTHROCYTE [DISTWIDTH] IN BLOOD BY AUTOMATED COUNT: 14.2 % (ref 11.5–14.5)
EST. GFR  (AFRICAN AMERICAN): >60 ML/MIN/1.73 M^2
EST. GFR  (NON AFRICAN AMERICAN): >60 ML/MIN/1.73 M^2
GLUCOSE SERPL-MCNC: 107 MG/DL (ref 70–110)
HCT VFR BLD AUTO: 32 % (ref 40–54)
HGB BLD-MCNC: 10.6 G/DL (ref 14–18)
IMM GRANULOCYTES # BLD AUTO: 0.07 K/UL (ref 0–0.04)
IMM GRANULOCYTES NFR BLD AUTO: 0.8 % (ref 0–0.5)
LYMPHOCYTES # BLD AUTO: 1.4 K/UL (ref 1–4.8)
LYMPHOCYTES NFR BLD: 15.5 % (ref 18–48)
MAGNESIUM SERPL-MCNC: 2 MG/DL (ref 1.6–2.6)
MCH RBC QN AUTO: 27.1 PG (ref 27–31)
MCHC RBC AUTO-ENTMCNC: 33.1 G/DL (ref 32–36)
MCV RBC AUTO: 82 FL (ref 82–98)
MONOCYTES # BLD AUTO: 0.7 K/UL (ref 0.3–1)
MONOCYTES NFR BLD: 7.5 % (ref 4–15)
NEUTROPHILS # BLD AUTO: 6.8 K/UL (ref 1.8–7.7)
NEUTROPHILS NFR BLD: 73 % (ref 38–73)
NRBC BLD-RTO: 0 /100 WBC
PHOSPHATE SERPL-MCNC: 3.6 MG/DL (ref 2.7–4.5)
PLATELET # BLD AUTO: 275 K/UL (ref 150–350)
PMV BLD AUTO: 9.5 FL (ref 9.2–12.9)
POTASSIUM SERPL-SCNC: 3.8 MMOL/L (ref 3.5–5.1)
RBC # BLD AUTO: 3.91 M/UL (ref 4.6–6.2)
SODIUM SERPL-SCNC: 133 MMOL/L (ref 136–145)
WBC # BLD AUTO: 9.29 K/UL (ref 3.9–12.7)

## 2019-06-27 PROCEDURE — 84100 ASSAY OF PHOSPHORUS: CPT

## 2019-06-27 PROCEDURE — A4216 STERILE WATER/SALINE, 10 ML: HCPCS | Performed by: COLON & RECTAL SURGERY

## 2019-06-27 PROCEDURE — 20600001 HC STEP DOWN PRIVATE ROOM

## 2019-06-27 PROCEDURE — S0028 INJECTION, FAMOTIDINE, 20 MG: HCPCS | Performed by: STUDENT IN AN ORGANIZED HEALTH CARE EDUCATION/TRAINING PROGRAM

## 2019-06-27 PROCEDURE — 25000003 PHARM REV CODE 250: Performed by: STUDENT IN AN ORGANIZED HEALTH CARE EDUCATION/TRAINING PROGRAM

## 2019-06-27 PROCEDURE — 83735 ASSAY OF MAGNESIUM: CPT

## 2019-06-27 PROCEDURE — 80048 BASIC METABOLIC PNL TOTAL CA: CPT

## 2019-06-27 PROCEDURE — 25000003 PHARM REV CODE 250: Performed by: COLON & RECTAL SURGERY

## 2019-06-27 PROCEDURE — B4185 PARENTERAL SOL 10 GM LIPIDS: HCPCS | Performed by: STUDENT IN AN ORGANIZED HEALTH CARE EDUCATION/TRAINING PROGRAM

## 2019-06-27 PROCEDURE — 85025 COMPLETE CBC W/AUTO DIFF WBC: CPT

## 2019-06-27 PROCEDURE — 63600175 PHARM REV CODE 636 W HCPCS: Performed by: STUDENT IN AN ORGANIZED HEALTH CARE EDUCATION/TRAINING PROGRAM

## 2019-06-27 RX ADMIN — FAMOTIDINE 20 MG: 10 INJECTION, SOLUTION INTRAVENOUS at 09:06

## 2019-06-27 RX ADMIN — Medication 10 ML: at 06:06

## 2019-06-27 RX ADMIN — ENOXAPARIN SODIUM 40 MG: 100 INJECTION SUBCUTANEOUS at 09:06

## 2019-06-27 RX ADMIN — TAMSULOSIN HYDROCHLORIDE 0.4 MG: 0.4 CAPSULE ORAL at 09:06

## 2019-06-27 RX ADMIN — SOYBEAN OIL 250 ML: 20 INJECTION, SOLUTION INTRAVENOUS at 09:06

## 2019-06-27 RX ADMIN — Medication 10 ML: at 12:06

## 2019-06-27 NOTE — PROGRESS NOTES
[facility]  Colorectal Surgery  Progress Note    Patient Name: Cale Anthony Sr.  MRN: [id number]  Admission Date: 6/21/2019  Hospital Length of Stay: 6 days  Attending Physician: Dino Rodríguez MD    Subjective:     Interval History: NGT fell out, replaced yesterday, continues to small amt of ileostomy OP, pain controlled     Post-Op Info:  Procedure(s) (LRB):  COLECTOMY, TOTAL, ABDOMINAL with end ileostomy (N/A)   6 Days Post-Op      Medications:  Continuous Infusions:   TPN ADULT CENTRAL LINE CUSTOM 75 mL/hr at 06/26/19 2127    TPN ADULT CENTRAL LINE CUSTOM       Scheduled Meds:   enoxaparin  40 mg Subcutaneous Daily    famotidine (PF)  20 mg Intravenous BID    fat emulsion 20%  250 mL Intravenous Daily    fat emulsion 20%  250 mL Intravenous Daily    sodium chloride 0.9%  10 mL Intravenous Q6H    tamsulosin  0.4 mg Oral Daily     PRN Meds:   HYDROmorphone    ibuprofen    ondansetron    oxyCODONE    simethicone    sodium chloride 0.9%        Objective:     Vital Signs (Most Recent):  Temp: 97.2 °F (36.2 °C) (06/27/19 0728)  Pulse: 72 (06/27/19 0728)  Resp: 14 (06/27/19 0728)  BP: (!) 148/73 (06/27/19 0728)  SpO2: 100 % (06/27/19 0728) Vital Signs (24h Range):  Temp:  [96.9 °F (36.1 °C)-98.8 °F (37.1 °C)] 97.2 °F (36.2 °C)  Pulse:  [72-90] 72  Resp:  [14-18] 14  SpO2:  [96 %-100 %] 100 %  BP: (132-162)/(66-81) 148/73     Intake/Output - Last 3 Shifts       06/25 0700 - 06/26 0659 06/26 0700 - 06/27 0659 06/27 0700 - 06/28 0659    P.O. 240      I.V. (mL/kg) 1679.2 (28.3)      IV Piggyback       .3 1702.4     Total Intake(mL/kg) 2628.4 (44.2) 1702.4 (28.7)     Urine (mL/kg/hr) 1000 (0.7) 0 (0)     Emesis/NG output       Drains 1550 1000     Stool 50 75     Blood       Total Output 2600 1075     Net +28.4 +627.4            Urine Occurrence 3 x 11 x     Stool Occurrence 0 x 0 x           Physical Exam   Constitutional: He is oriented to person, place, and time. No distress.    Eyes: EOM are normal.   Neck: Neck supple.   Cardiovascular: Normal rate and regular rhythm.   Pulmonary/Chest: Effort normal. No respiratory distress.   Abdominal: Soft. There is no tenderness.   Mild distention, ileostomy in place-pink/viable, small amt of stool in bag   Musculoskeletal: Normal range of motion.   Neurological: He is alert and oriented to person, place, and time.   Skin: Skin is warm and dry.         Significant Labs:  BMP (Last 3 Results):   Recent Labs   Lab 06/25/19 0446 06/26/19  0332 06/27/19  0415   * 105 107   * 134* 133*   K 4.1 3.5 3.8   CL 96 97 98   CO2 27 29 29   BUN 4* 6* 7*   CREATININE 0.7 0.6 0.6   CALCIUM 8.9 8.9 8.6*   MG 1.7 2.0 2.0     CBC (Last 3 Results):   Recent Labs   Lab 06/25/19 0446 06/26/19  0332 06/27/19  0415   WBC 8.92 8.16 9.29   RBC 3.95* 3.86* 3.91*   HGB 10.8* 10.5* 10.6*   HCT 33.2* 32.1* 32.0*    278 275   MCV 84 83 82   MCH 27.3 27.2 27.1   MCHC 32.5 32.7 33.1       Significant Diagnostics:  I have reviewed all pertinent imaging results/findings within the past 24 hours.    Assessment/Plan:     * Large bowel obstruction  Cale Anthony Sr. is a 88 y.o. male with LBO secondary to sigmoid colon mass with cecal pneumatosis on CT scan s/p exlap, TAC w/ EI on 6/21/19     -cont NPO, NGT  -cont TPN  -WOCN for new ileostomy  -OOB, PT, IS, lovenox    Dispo: Awaiting resolution of ileus          Lobito Mccloud MD  Colorectal Surgery  [facility]

## 2019-06-27 NOTE — SUBJECTIVE & OBJECTIVE
Subjective:     Interval History: NGT fell out, replaced yesterday, continues to small amt of ileostomy OP, pain controlled     Post-Op Info:  Procedure(s) (LRB):  COLECTOMY, TOTAL, ABDOMINAL with end ileostomy (N/A)   6 Days Post-Op      Medications:  Continuous Infusions:   TPN ADULT CENTRAL LINE CUSTOM 75 mL/hr at 06/26/19 2127    TPN ADULT CENTRAL LINE CUSTOM       Scheduled Meds:   enoxaparin  40 mg Subcutaneous Daily    famotidine (PF)  20 mg Intravenous BID    fat emulsion 20%  250 mL Intravenous Daily    fat emulsion 20%  250 mL Intravenous Daily    sodium chloride 0.9%  10 mL Intravenous Q6H    tamsulosin  0.4 mg Oral Daily     PRN Meds:   HYDROmorphone    ibuprofen    ondansetron    oxyCODONE    simethicone    sodium chloride 0.9%        Objective:     Vital Signs (Most Recent):  Temp: 97.2 °F (36.2 °C) (06/27/19 0728)  Pulse: 72 (06/27/19 0728)  Resp: 14 (06/27/19 0728)  BP: (!) 148/73 (06/27/19 0728)  SpO2: 100 % (06/27/19 0728) Vital Signs (24h Range):  Temp:  [96.9 °F (36.1 °C)-98.8 °F (37.1 °C)] 97.2 °F (36.2 °C)  Pulse:  [72-90] 72  Resp:  [14-18] 14  SpO2:  [96 %-100 %] 100 %  BP: (132-162)/(66-81) 148/73     Intake/Output - Last 3 Shifts       06/25 0700 - 06/26 0659 06/26 0700 - 06/27 0659 06/27 0700 - 06/28 0659    P.O. 240      I.V. (mL/kg) 1679.2 (28.3)      IV Piggyback       .3 1702.4     Total Intake(mL/kg) 2628.4 (44.2) 1702.4 (28.7)     Urine (mL/kg/hr) 1000 (0.7) 0 (0)     Emesis/NG output       Drains 1550 1000     Stool 50 75     Blood       Total Output 2600 1075     Net +28.4 +627.4            Urine Occurrence 3 x 11 x     Stool Occurrence 0 x 0 x           Physical Exam   Constitutional: He is oriented to person, place, and time. No distress.   Eyes: EOM are normal.   Neck: Neck supple.   Cardiovascular: Normal rate and regular rhythm.   Pulmonary/Chest: Effort normal. No respiratory distress.   Abdominal: Soft. There is no tenderness.   Mild distention,  ileostomy in place-pink/viable, small amt of stool in bag   Musculoskeletal: Normal range of motion.   Neurological: He is alert and oriented to person, place, and time.   Skin: Skin is warm and dry.         Significant Labs:  BMP (Last 3 Results):   Recent Labs   Lab 06/25/19 0446 06/26/19 0332 06/27/19 0415   * 105 107   * 134* 133*   K 4.1 3.5 3.8   CL 96 97 98   CO2 27 29 29   BUN 4* 6* 7*   CREATININE 0.7 0.6 0.6   CALCIUM 8.9 8.9 8.6*   MG 1.7 2.0 2.0     CBC (Last 3 Results):   Recent Labs   Lab 06/25/19 0446 06/26/19 0332 06/27/19 0415   WBC 8.92 8.16 9.29   RBC 3.95* 3.86* 3.91*   HGB 10.8* 10.5* 10.6*   HCT 33.2* 32.1* 32.0*    278 275   MCV 84 83 82   MCH 27.3 27.2 27.1   MCHC 32.5 32.7 33.1       Significant Diagnostics:  I have reviewed all pertinent imaging results/findings within the past 24 hours.

## 2019-06-27 NOTE — NURSING
NG tube pulled out per patient on accident. Notified MD Frey, instructed to leave NG tube out. Notified patient and family to let nurse know if any nausea occurs. Will call team if Nausea returns.

## 2019-06-27 NOTE — NURSING
Ostomy nurse follow up.    Patient sitting up in chair. Patient far more talkative today. Daughter at bedside. Verbally discussed entire process of changing the appliance. Patient demonstrated opening and closing an empty pouch. Patient and nurse instructed to have patient do the emptying of pouch today. Patient verbalized understanding.   Patient was able to verbally return the process of cutting pouch and applying to cleansed skin.     Will plan for pouch change tomorrow as he is likely to discharge then.   Pouch is intact and stoma appears pink and well budded.    Jessy Sepulveda RN [facility]   [phone]

## 2019-06-27 NOTE — PLAN OF CARE
Problem: Adult Inpatient Plan of Care  Goal: Plan of Care Review  Outcome: Ongoing (interventions implemented as appropriate)  AAO x 4. ML abd incision with DB. RLQ ileostomy. R nare NG tube. Pt voids in urinal and ambulates with stand by assistance. NPO with TPN going at 75, no complaints of N/V overnight. VSS on RA. On tele running NSR. No complaints of pain overnight. Pt remained free of falls overnight. POC reviewed with pt who verbalized understanding. Bed in low position, call light in reach. No signs of distress or concerns noted at this time. WCTM.

## 2019-06-28 LAB
ANION GAP SERPL CALC-SCNC: 7 MMOL/L (ref 8–16)
BASOPHILS # BLD AUTO: 0.06 K/UL (ref 0–0.2)
BASOPHILS NFR BLD: 0.7 % (ref 0–1.9)
BUN SERPL-MCNC: 9 MG/DL (ref 8–23)
CALCIUM SERPL-MCNC: 8.7 MG/DL (ref 8.7–10.5)
CHLORIDE SERPL-SCNC: 101 MMOL/L (ref 95–110)
CO2 SERPL-SCNC: 27 MMOL/L (ref 23–29)
CREAT SERPL-MCNC: 0.5 MG/DL (ref 0.5–1.4)
DIFFERENTIAL METHOD: ABNORMAL
EOSINOPHIL # BLD AUTO: 0.4 K/UL (ref 0–0.5)
EOSINOPHIL NFR BLD: 4.9 % (ref 0–8)
ERYTHROCYTE [DISTWIDTH] IN BLOOD BY AUTOMATED COUNT: 14.2 % (ref 11.5–14.5)
EST. GFR  (AFRICAN AMERICAN): >60 ML/MIN/1.73 M^2
EST. GFR  (NON AFRICAN AMERICAN): >60 ML/MIN/1.73 M^2
GLUCOSE SERPL-MCNC: 105 MG/DL (ref 70–110)
HCT VFR BLD AUTO: 30.3 % (ref 40–54)
HGB BLD-MCNC: 9.9 G/DL (ref 14–18)
IMM GRANULOCYTES # BLD AUTO: 0.11 K/UL (ref 0–0.04)
IMM GRANULOCYTES NFR BLD AUTO: 1.3 % (ref 0–0.5)
LYMPHOCYTES # BLD AUTO: 1.6 K/UL (ref 1–4.8)
LYMPHOCYTES NFR BLD: 19.2 % (ref 18–48)
MAGNESIUM SERPL-MCNC: 2 MG/DL (ref 1.6–2.6)
MCH RBC QN AUTO: 26.8 PG (ref 27–31)
MCHC RBC AUTO-ENTMCNC: 32.7 G/DL (ref 32–36)
MCV RBC AUTO: 82 FL (ref 82–98)
MONOCYTES # BLD AUTO: 0.7 K/UL (ref 0.3–1)
MONOCYTES NFR BLD: 7.6 % (ref 4–15)
NEUTROPHILS # BLD AUTO: 5.7 K/UL (ref 1.8–7.7)
NEUTROPHILS NFR BLD: 66.3 % (ref 38–73)
NRBC BLD-RTO: 0 /100 WBC
PHOSPHATE SERPL-MCNC: 4 MG/DL (ref 2.7–4.5)
PLATELET # BLD AUTO: 271 K/UL (ref 150–350)
PMV BLD AUTO: 9.9 FL (ref 9.2–12.9)
POTASSIUM SERPL-SCNC: 4.1 MMOL/L (ref 3.5–5.1)
RBC # BLD AUTO: 3.69 M/UL (ref 4.6–6.2)
SODIUM SERPL-SCNC: 135 MMOL/L (ref 136–145)
TRIGL SERPL-MCNC: 139 MG/DL (ref 30–150)
WBC # BLD AUTO: 8.54 K/UL (ref 3.9–12.7)

## 2019-06-28 PROCEDURE — 84478 ASSAY OF TRIGLYCERIDES: CPT

## 2019-06-28 PROCEDURE — A4217 STERILE WATER/SALINE, 500 ML: HCPCS | Performed by: STUDENT IN AN ORGANIZED HEALTH CARE EDUCATION/TRAINING PROGRAM

## 2019-06-28 PROCEDURE — 63600175 PHARM REV CODE 636 W HCPCS: Performed by: STUDENT IN AN ORGANIZED HEALTH CARE EDUCATION/TRAINING PROGRAM

## 2019-06-28 PROCEDURE — 25000003 PHARM REV CODE 250: Performed by: STUDENT IN AN ORGANIZED HEALTH CARE EDUCATION/TRAINING PROGRAM

## 2019-06-28 PROCEDURE — A4216 STERILE WATER/SALINE, 10 ML: HCPCS | Performed by: COLON & RECTAL SURGERY

## 2019-06-28 PROCEDURE — 97116 GAIT TRAINING THERAPY: CPT

## 2019-06-28 PROCEDURE — 80048 BASIC METABOLIC PNL TOTAL CA: CPT

## 2019-06-28 PROCEDURE — 20600001 HC STEP DOWN PRIVATE ROOM

## 2019-06-28 PROCEDURE — 25000003 PHARM REV CODE 250: Performed by: COLON & RECTAL SURGERY

## 2019-06-28 PROCEDURE — 84100 ASSAY OF PHOSPHORUS: CPT

## 2019-06-28 PROCEDURE — B4185 PARENTERAL SOL 10 GM LIPIDS: HCPCS | Performed by: STUDENT IN AN ORGANIZED HEALTH CARE EDUCATION/TRAINING PROGRAM

## 2019-06-28 PROCEDURE — 85025 COMPLETE CBC W/AUTO DIFF WBC: CPT

## 2019-06-28 PROCEDURE — 36415 COLL VENOUS BLD VENIPUNCTURE: CPT

## 2019-06-28 PROCEDURE — S0028 INJECTION, FAMOTIDINE, 20 MG: HCPCS | Performed by: STUDENT IN AN ORGANIZED HEALTH CARE EDUCATION/TRAINING PROGRAM

## 2019-06-28 PROCEDURE — 83735 ASSAY OF MAGNESIUM: CPT

## 2019-06-28 PROCEDURE — 94761 N-INVAS EAR/PLS OXIMETRY MLT: CPT

## 2019-06-28 RX ADMIN — TAMSULOSIN HYDROCHLORIDE 0.4 MG: 0.4 CAPSULE ORAL at 08:06

## 2019-06-28 RX ADMIN — Medication 10 ML: at 12:06

## 2019-06-28 RX ADMIN — FAMOTIDINE 20 MG: 10 INJECTION, SOLUTION INTRAVENOUS at 09:06

## 2019-06-28 RX ADMIN — Medication 10 ML: at 06:06

## 2019-06-28 RX ADMIN — SOYBEAN OIL 250 ML: 20 INJECTION, SOLUTION INTRAVENOUS at 09:06

## 2019-06-28 RX ADMIN — FAMOTIDINE 20 MG: 10 INJECTION, SOLUTION INTRAVENOUS at 08:06

## 2019-06-28 RX ADMIN — ENOXAPARIN SODIUM 40 MG: 100 INJECTION SUBCUTANEOUS at 08:06

## 2019-06-28 RX ADMIN — MAGNESIUM SULFATE HEPTAHYDRATE: 500 INJECTION, SOLUTION INTRAMUSCULAR; INTRAVENOUS at 09:06

## 2019-06-28 NOTE — PHYSICIAN QUERY
PT Name: Cale Anthony Sr.  MR #: [id number]    Physician Query Form - Consultant Diagnosis Clarification     CDS/: Nicky Guzman RN, CDS               Contact information: [email or web address]                                                                                                                         [phone]  This form is a permanent document in the medical record.     Query Date: June 28, 2019      By submitting this query, we are merely seeking further clarification of documentation.  Please utilize your independent clinical judgment when addressing the question(s) below.      The Medical record contains the following:   Diagnosis Supporting Clinical Information Location in Medical Record    Pt with severe malnutrition    Pt reports very poor appetite for 1 month PTA 2/2 stomach issues. States that sometimes he would not eat for days. Per MD note, ~60lb wt loss in 1 year.    BMI (Calculated): 21.8    % Weight Change From Usual Weight: (P) -27.77 %    Energy Intake: <50% of estimated energy requirement for 1 month    Body Fat Depletion: severe depletion of orbitals and triceps   Muscle Mass Depletion: severe depletion of temples, clavicle region and lower extremities    Custom TPN 90 gm AA / 270 gm Dex + IV lipids to provide 1778 kcal, 90 gm protein, and GIR 3.2.   RD PN 6/26    RD PN 6/26          RD PN 6/26    RD PN 6/26      RD PN 6/26      RD PN 6/26 RD PN 6/26         Do you agree with the Consultants diagnosis of ___severe malnutrition____?    [x  ] Yes   [  ] No   [  ] Other/Clarification of findings:   [  ] Clinically undetermined

## 2019-06-28 NOTE — PROGRESS NOTES
[facility]  Colorectal Surgery  Progress Note    Patient Name: Cale Anthony Sr.  MRN: [id number]  Admission Date: 6/21/2019  Hospital Length of Stay: 7 days  Attending Physician: Dino Rodríguez MD    Subjective:     Interval History: NGT fell out in the morning and was left out. Did well. No N/V. Clears today. In very good spirits.    Post-Op Info:  Procedure(s) (LRB):  COLECTOMY, TOTAL, ABDOMINAL with end ileostomy (N/A)   7 Days Post-Op      Medications:  Continuous Infusions:   TPN ADULT CENTRAL LINE CUSTOM 75 mL/hr at 06/27/19 2147    TPN ADULT CENTRAL LINE CUSTOM       Scheduled Meds:   enoxaparin  40 mg Subcutaneous Daily    famotidine (PF)  20 mg Intravenous BID    fat emulsion 20%  250 mL Intravenous Daily    sodium chloride 0.9%  10 mL Intravenous Q6H    tamsulosin  0.4 mg Oral Daily     PRN Meds:   HYDROmorphone    ibuprofen    ondansetron    oxyCODONE    simethicone    sodium chloride 0.9%        Objective:     Vital Signs (Most Recent):  Temp: 97.4 °F (36.3 °C) (06/28/19 0710)  Pulse: 76 (06/28/19 0725)  Resp: 17 (06/28/19 0710)  BP: 138/64 (06/28/19 0710)  SpO2: 99 % (06/28/19 0710) Vital Signs (24h Range):  Temp:  [97.4 °F (36.3 °C)-98.3 °F (36.8 °C)] 97.4 °F (36.3 °C)  Pulse:  [63-85] 76  Resp:  [14-17] 17  SpO2:  [96 %-100 %] 99 %  BP: (117-138)/(58-64) 138/64     Intake/Output - Last 3 Shifts       06/26 0700 - 06/27 0659 06/27 0700 - 06/28 0659 06/28 0700 - 06/29 0659    P.O.  25     I.V. (mL/kg)       TPN 1702.4 1662.7     Total Intake(mL/kg) 1702.4 (28.7) 1687.7 (28.4)     Urine (mL/kg/hr) 0 (0) 1150 (0.8)     Drains 1000 200     Stool 75 50     Total Output 1075 1400     Net +627.4 +287.7            Urine Occurrence 11 x 3 x     Stool Occurrence 0 x 1 x           Physical Exam   Constitutional: He is oriented to person, place, and time. No distress.   Eyes: EOM are normal.   Neck: Neck supple.   Cardiovascular: Normal rate and regular rhythm.   Pulmonary/Chest:  Effort normal. No respiratory distress.   Abdominal: Soft. There is no tenderness.   Distention improved, ileostomy in place-pink/viable, increasing green/brown output   Musculoskeletal: Normal range of motion.   Neurological: He is alert and oriented to person, place, and time.   Skin: Skin is warm and dry.         Significant Labs:  BMP (Last 3 Results):   Recent Labs   Lab 06/26/19 0332 06/27/19 0415 06/28/19  0425    107 105   * 133* 135*   K 3.5 3.8 4.1   CL 97 98 101   CO2 29 29 27   BUN 6* 7* 9   CREATININE 0.6 0.6 0.5   CALCIUM 8.9 8.6* 8.7   MG 2.0 2.0 2.0     CBC (Last 3 Results):   Recent Labs   Lab 06/26/19 0332 06/27/19 0415 06/28/19  0425   WBC 8.16 9.29 8.54   RBC 3.86* 3.91* 3.69*   HGB 10.5* 10.6* 9.9*   HCT 32.1* 32.0* 30.3*    275 271   MCV 83 82 82   MCH 27.2 27.1 26.8*   MCHC 32.7 33.1 32.7       Significant Diagnostics:  I have reviewed all pertinent imaging results/findings within the past 24 hours.    Assessment/Plan:     * Large bowel obstruction  Cale Anthony . is a 88 y.o. 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

## 2019-06-28 NOTE — PLAN OF CARE
06/28/19 1111   Post-Acute Status   Post-Acute Authorization Home Health/Hospice   Pt not medically ready for discharge. Plan is for patient to have home health for post acute care. Per pt's insurance, referral has to be sent to [facility] who selects home health agency. Referral will be sent to [facility] once home health orders are written.    Sanjana Smith LMSW  [facility]  [id number]

## 2019-06-28 NOTE — PLAN OF CARE
Problem: Adult Inpatient Plan of Care  Goal: Plan of Care Review  Outcome: Ongoing (interventions implemented as appropriate)  Goal: Plan of Care Review  POC reviewed with patient and family. AAOX4. VSS. ML ABD incision intact. No complaints of nausea throughout shift. TPN infusing at 75ml/hr to R. PICC. No complaints of pain. Voids per urinal. Pt. Assisted in emptying ostomy. 25ml of output from RLQ ileostomy. Diet advanced to clear liquids, tolerating diet well consuming 25% of meals. Pt. Up in chair throughout shift, ambulated with stand by assist. Ambulated in halls with physical therapy.Tele monitor removed per orders. Frequent rounding completed per nurse. Family at bedside, call light within reach.

## 2019-06-28 NOTE — ASSESSMENT & PLAN NOTE
Cale Anthony . is a 88 y.o. male with LBO secondary to sigmoid colon mass with cecal pneumatosis on CT scan s/p exlap, TAC w/ EI on 6/21/19     -NGT out 6/27; trying clears today  -cont TPN until tolerating diet  -WOCN for new ileostomy  -OOB, PT, IS, lovenox    Dispo: Likely DC over the weekend or early next week

## 2019-06-28 NOTE — SUBJECTIVE & OBJECTIVE
Subjective:     Interval History: NGT fell out in the morning and was left out. Did well. No N/V. Clears today. In very good spirits.    Post-Op Info:  Procedure(s) (LRB):  COLECTOMY, TOTAL, ABDOMINAL with end ileostomy (N/A)   7 Days Post-Op      Medications:  Continuous Infusions:   TPN ADULT CENTRAL LINE CUSTOM 75 mL/hr at 06/27/19 2147    TPN ADULT CENTRAL LINE CUSTOM       Scheduled Meds:   enoxaparin  40 mg Subcutaneous Daily    famotidine (PF)  20 mg Intravenous BID    fat emulsion 20%  250 mL Intravenous Daily    sodium chloride 0.9%  10 mL Intravenous Q6H    tamsulosin  0.4 mg Oral Daily     PRN Meds:   HYDROmorphone    ibuprofen    ondansetron    oxyCODONE    simethicone    sodium chloride 0.9%        Objective:     Vital Signs (Most Recent):  Temp: 97.4 °F (36.3 °C) (06/28/19 0710)  Pulse: 76 (06/28/19 0725)  Resp: 17 (06/28/19 0710)  BP: 138/64 (06/28/19 0710)  SpO2: 99 % (06/28/19 0710) Vital Signs (24h Range):  Temp:  [97.4 °F (36.3 °C)-98.3 °F (36.8 °C)] 97.4 °F (36.3 °C)  Pulse:  [63-85] 76  Resp:  [14-17] 17  SpO2:  [96 %-100 %] 99 %  BP: (117-138)/(58-64) 138/64     Intake/Output - Last 3 Shifts       06/26 0700 - 06/27 0659 06/27 0700 - 06/28 0659 06/28 0700 - 06/29 0659    P.O.  25     I.V. (mL/kg)       TPN 1702.4 1662.7     Total Intake(mL/kg) 1702.4 (28.7) 1687.7 (28.4)     Urine (mL/kg/hr) 0 (0) 1150 (0.8)     Drains 1000 200     Stool 75 50     Total Output 1075 1400     Net +627.4 +287.7            Urine Occurrence 11 x 3 x     Stool Occurrence 0 x 1 x           Physical Exam   Constitutional: He is oriented to person, place, and time. No distress.   Eyes: EOM are normal.   Neck: Neck supple.   Cardiovascular: Normal rate and regular rhythm.   Pulmonary/Chest: Effort normal. No respiratory distress.   Abdominal: Soft. There is no tenderness.   Distention improved, ileostomy in place-pink/viable, increasing green/brown output   Musculoskeletal: Normal range of motion.    Neurological: He is alert and oriented to person, place, and time.   Skin: Skin is warm and dry.         Significant Labs:  BMP (Last 3 Results):   Recent Labs   Lab 06/26/19 0332 06/27/19 0415 06/28/19 0425    107 105   * 133* 135*   K 3.5 3.8 4.1   CL 97 98 101   CO2 29 29 27   BUN 6* 7* 9   CREATININE 0.6 0.6 0.5   CALCIUM 8.9 8.6* 8.7   MG 2.0 2.0 2.0     CBC (Last 3 Results):   Recent Labs   Lab 06/26/19  0332 06/27/19 0415 06/28/19  0425   WBC 8.16 9.29 8.54   RBC 3.86* 3.91* 3.69*   HGB 10.5* 10.6* 9.9*   HCT 32.1* 32.0* 30.3*    275 271   MCV 83 82 82   MCH 27.2 27.1 26.8*   MCHC 32.7 33.1 32.7       Significant Diagnostics:  I have reviewed all pertinent imaging results/findings within the past 24 hours.

## 2019-06-28 NOTE — PLAN OF CARE
Problem: Adult Inpatient Plan of Care  Goal: Plan of Care Review  POC reviewed with patient and family. AAOX4. VSS. ML ABD incision intact. NG to R.nare removed per patient on accident. Notified MD Frey. No complaints of nausea throughout shift. TPN infusing at 75ml/hr to R. PICC. No complaints of pain. Voids per urinal. Pt. Assisted in emptying ostomy. 50ml of output from RLQ ileostomy. NPO diet maintained. Pt. Up in chair throughout shift, ambulated with stand by assist. Tele monitor in place, NSR. Frequent rounding completed per nurse. Family at bedside, call light within reach.

## 2019-06-28 NOTE — NURSING
Ostomy follow up.    Patient able to  mirror in bathroom for pouch change. Patient assisted with removal of pouch. Peristomal skin intact and area cleansed with warm water.  Pouch cut to 35 mm and applied.     Patient's wife at bedside. Reviewed ostomy care and appliance changes with her as well.      Patient has large family that are willing to help with ostomy care as well as son whom had ostomy in the past.   I and O flow sheet and food list at bedside. Supplies in purple bag and several bags pre-cut to 35 mm.     Patient has been emptying his pouch with the nurse.     Patient and wife deny any further questions.     Patient to MI home with HH.     Jessy Sepulveda RN CWCN CN   [phone]

## 2019-06-28 NOTE — PLAN OF CARE
CM met with patient and patient's son and daughter. Patient lives with spouse, Alberta ( [phone]). Patient and family states patient was independent with all ADLs prior to hospital admit. Patient would like to receive Home Health Care for post acute ostomy care/education. Patient denies having a  provider preference. CM and SW will continue to follow for discharge needs.    PCP: Mal Santoyo MD    Pharmacy:   [id number] - [place], [place] - [address]  [address]  [place] [id number]  Phone: [phone] Fax: [phone]    Payor: [employer] MANAGED MEDICARE / Plan: [employer] CHOICES 65 / Product Type: Medicare Advantage /      06/26/19 1110   Discharge Reassessment   Assessment Type Discharge Planning Reassessment   Discharge Plan A Home;Home with family;Home Health   Discharge Plan B Home;Home with family   DME Needed Upon Discharge  colostomy/ostomy supplies

## 2019-06-28 NOTE — PLAN OF CARE
Problem: Adult Inpatient Plan of Care  Goal: Plan of Care Review  Outcome: Ongoing (interventions implemented as appropriate)  POC reviewed with pt who verbalized understanding. AAOx4. VSS on RA. Remains free of falls and injury. TPN and Lipids infusing throughout shift. No compaints of pain or nausea. NPO. Voiding per urinal. Tele monitored. Resting well between care. SCDs in place. No acute events. No distress noted. Son at bedside. Will continue to monitor.

## 2019-06-28 NOTE — PT/OT/SLP PROGRESS
"Physical Therapy Treatment    Patient Name:  Cale Anthony Sr.   MRN:  [id number]    Recommendations:     Discharge Recommendations:  home health PT   Discharge Equipment Recommendations: none(pt has rollator available for him to use upon D/C)   Barriers to discharge: Decreased caregiver support (pt's wife uses a cane for mobility)    Assessment:     Cale Anthony Sr. is a 88 y.o. male admitted with a medical diagnosis of Large bowel obstruction.  He presents with the following impairments/functional limitations:  gait instability, impaired balance, impaired functional mobilty . Pt remains with instability to the R and anterior with gait, improved with safety with RW. Pt educated to use the RW for mobility for safety at this time.    Rehab Prognosis: Good; patient would benefit from acute skilled PT services to address these deficits and reach maximum level of function.    Recent Surgery: Procedure(s) (LRB):  COLECTOMY, TOTAL, ABDOMINAL with end ileostomy (N/A) 7 Days Post-Op    Plan:     During this hospitalization, patient to be seen 4 x/week to address the identified rehab impairments via gait training, therapeutic activities, therapeutic exercises, neuromuscular re-education and progress toward the following goals:    · Plan of Care Expires:  07/22/19    Subjective   "I just need to get my strength back"  Pain/Comfort:  · Pain Rating 1: 0/10  · Pain Rating Post-Intervention 1: 0/10      Objective:     Communicated with nurse prior to session.  Patient found up in chair with telemetry upon PT entry to room.     General Precautions: Standard, fall   Orthopedic Precautions:N/A   Braces: N/A     Functional Mobility:  · Transfers:     · Sit to Stand:  supervision with no AD  · Gait: 200ft with no AD with CGA with anterior and R sided instability and pt reaching for the wall rail for support. Pt then ambulated 200ft with RW with SBA with flexed trunk but improved stability.      AM-PAC 6 CLICK MOBILITY  Turning over " in bed (including adjusting bedclothes, sheets and blankets)?: 4  Sitting down on and standing up from a chair with arms (e.g., wheelchair, bedside commode, etc.): 4  Moving from lying on back to sitting on the side of the bed?: 3  Moving to and from a bed to a chair (including a wheelchair)?: 3  Need to walk in hospital room?: 3  Climbing 3-5 steps with a railing?: 3  Basic Mobility Total Score: 20     Patient left up in chair with all lines intact, call button in reach, nurse notified and family present..    GOALS:   Multidisciplinary Problems     Physical Therapy Goals        Problem: Physical Therapy Goal    Goal Priority Disciplines Outcome Goal Variances Interventions   Physical Therapy Goal     PT, PT/OT Ongoing (interventions implemented as appropriate)     Description:  PT goals until 7/2/19    1. Pt supine to sit with supervision- met 6/25  2. Pt sit to supine with supervision-not met  3. Pt sit to stand with no AD with supervision- met  4. Pt to perform gait 300ft with no AD with supervision.-not met  Revised goal: gait 300ft with RW with supervision-not met                        Time Tracking:     PT Received On: 06/28/19  PT Start Time: 1144     PT Stop Time: 1202  PT Total Time (min): 18 min     Billable Minutes: Gait Training 18    Treatment Type: Treatment  PT/PTA: PT           Fara Mancera, PT  06/28/2019

## 2019-06-28 NOTE — PLAN OF CARE
Problem: Physical Therapy Goal  Goal: Physical Therapy Goal  PT goals until 7/2/19    1. Pt supine to sit with supervision- met 6/25  2. Pt sit to supine with supervision-not met  3. Pt sit to stand with no AD with supervision- met  4. Pt to perform gait 300ft with no AD with supervision.-not met  Revised goal: gait 300ft with RW with supervision-not met      Outcome: Ongoing (interventions implemented as appropriate)  Pt's goals revised as needed to include Rw for support during gait and pt will continue to benefit from skilled PT services to work towards improved functional mobility including: bed mobility, transfers, and gait.   Fara Mancera, PT  6/28/2019

## 2019-06-29 LAB
ANION GAP SERPL CALC-SCNC: 6 MMOL/L (ref 8–16)
BASOPHILS # BLD AUTO: 0.04 K/UL (ref 0–0.2)
BASOPHILS NFR BLD: 0.5 % (ref 0–1.9)
BUN SERPL-MCNC: 10 MG/DL (ref 8–23)
CALCIUM SERPL-MCNC: 9 MG/DL (ref 8.7–10.5)
CHLORIDE SERPL-SCNC: 101 MMOL/L (ref 95–110)
CO2 SERPL-SCNC: 26 MMOL/L (ref 23–29)
CREAT SERPL-MCNC: 0.6 MG/DL (ref 0.5–1.4)
DIFFERENTIAL METHOD: ABNORMAL
EOSINOPHIL # BLD AUTO: 0.4 K/UL (ref 0–0.5)
EOSINOPHIL NFR BLD: 4.6 % (ref 0–8)
ERYTHROCYTE [DISTWIDTH] IN BLOOD BY AUTOMATED COUNT: 14.2 % (ref 11.5–14.5)
EST. GFR  (AFRICAN AMERICAN): >60 ML/MIN/1.73 M^2
EST. GFR  (NON AFRICAN AMERICAN): >60 ML/MIN/1.73 M^2
GLUCOSE SERPL-MCNC: 110 MG/DL (ref 70–110)
HCT VFR BLD AUTO: 30.2 % (ref 40–54)
HGB BLD-MCNC: 10 G/DL (ref 14–18)
IMM GRANULOCYTES # BLD AUTO: 0.11 K/UL (ref 0–0.04)
IMM GRANULOCYTES NFR BLD AUTO: 1.4 % (ref 0–0.5)
LYMPHOCYTES # BLD AUTO: 1.6 K/UL (ref 1–4.8)
LYMPHOCYTES NFR BLD: 20.1 % (ref 18–48)
MAGNESIUM SERPL-MCNC: 2.1 MG/DL (ref 1.6–2.6)
MCH RBC QN AUTO: 27.5 PG (ref 27–31)
MCHC RBC AUTO-ENTMCNC: 33.1 G/DL (ref 32–36)
MCV RBC AUTO: 83 FL (ref 82–98)
MONOCYTES # BLD AUTO: 0.5 K/UL (ref 0.3–1)
MONOCYTES NFR BLD: 6.7 % (ref 4–15)
NEUTROPHILS # BLD AUTO: 5.4 K/UL (ref 1.8–7.7)
NEUTROPHILS NFR BLD: 66.7 % (ref 38–73)
NRBC BLD-RTO: 0 /100 WBC
PHOSPHATE SERPL-MCNC: 4.2 MG/DL (ref 2.7–4.5)
PLATELET # BLD AUTO: 277 K/UL (ref 150–350)
PMV BLD AUTO: 9.5 FL (ref 9.2–12.9)
POTASSIUM SERPL-SCNC: 4.4 MMOL/L (ref 3.5–5.1)
RBC # BLD AUTO: 3.64 M/UL (ref 4.6–6.2)
SODIUM SERPL-SCNC: 133 MMOL/L (ref 136–145)
WBC # BLD AUTO: 8.09 K/UL (ref 3.9–12.7)

## 2019-06-29 PROCEDURE — S0028 INJECTION, FAMOTIDINE, 20 MG: HCPCS | Performed by: STUDENT IN AN ORGANIZED HEALTH CARE EDUCATION/TRAINING PROGRAM

## 2019-06-29 PROCEDURE — 25000003 PHARM REV CODE 250: Performed by: STUDENT IN AN ORGANIZED HEALTH CARE EDUCATION/TRAINING PROGRAM

## 2019-06-29 PROCEDURE — 94761 N-INVAS EAR/PLS OXIMETRY MLT: CPT

## 2019-06-29 PROCEDURE — 63600175 PHARM REV CODE 636 W HCPCS: Performed by: STUDENT IN AN ORGANIZED HEALTH CARE EDUCATION/TRAINING PROGRAM

## 2019-06-29 PROCEDURE — 83735 ASSAY OF MAGNESIUM: CPT

## 2019-06-29 PROCEDURE — 85025 COMPLETE CBC W/AUTO DIFF WBC: CPT

## 2019-06-29 PROCEDURE — 25000003 PHARM REV CODE 250: Performed by: COLON & RECTAL SURGERY

## 2019-06-29 PROCEDURE — 84100 ASSAY OF PHOSPHORUS: CPT

## 2019-06-29 PROCEDURE — 80048 BASIC METABOLIC PNL TOTAL CA: CPT

## 2019-06-29 PROCEDURE — B4185 PARENTERAL SOL 10 GM LIPIDS: HCPCS | Performed by: STUDENT IN AN ORGANIZED HEALTH CARE EDUCATION/TRAINING PROGRAM

## 2019-06-29 PROCEDURE — 20600001 HC STEP DOWN PRIVATE ROOM

## 2019-06-29 PROCEDURE — A4217 STERILE WATER/SALINE, 500 ML: HCPCS | Performed by: STUDENT IN AN ORGANIZED HEALTH CARE EDUCATION/TRAINING PROGRAM

## 2019-06-29 PROCEDURE — A4216 STERILE WATER/SALINE, 10 ML: HCPCS | Performed by: COLON & RECTAL SURGERY

## 2019-06-29 RX ORDER — THIAMINE HCL 100 MG
100 TABLET ORAL DAILY
Status: DISCONTINUED | OUTPATIENT
Start: 2019-06-29 | End: 2019-06-30 | Stop reason: HOSPADM

## 2019-06-29 RX ADMIN — TAMSULOSIN HYDROCHLORIDE 0.4 MG: 0.4 CAPSULE ORAL at 09:06

## 2019-06-29 RX ADMIN — Medication 10 ML: at 08:06

## 2019-06-29 RX ADMIN — SOYBEAN OIL 250 ML: 20 INJECTION, SOLUTION INTRAVENOUS at 08:06

## 2019-06-29 RX ADMIN — Medication 10 ML: at 05:06

## 2019-06-29 RX ADMIN — Medication 100 MG: at 09:06

## 2019-06-29 RX ADMIN — FAMOTIDINE 20 MG: 10 INJECTION, SOLUTION INTRAVENOUS at 09:06

## 2019-06-29 RX ADMIN — ENOXAPARIN SODIUM 40 MG: 100 INJECTION SUBCUTANEOUS at 09:06

## 2019-06-29 RX ADMIN — MAGNESIUM SULFATE HEPTAHYDRATE: 500 INJECTION, SOLUTION INTRAMUSCULAR; INTRAVENOUS at 08:06

## 2019-06-29 RX ADMIN — Medication 10 ML: at 12:06

## 2019-06-29 RX ADMIN — FAMOTIDINE 20 MG: 10 INJECTION, SOLUTION INTRAVENOUS at 08:06

## 2019-06-29 NOTE — ASSESSMENT & PLAN NOTE
Cale Anthony . is a 88 y.o. male with LBO secondary to sigmoid colon mass with cecal pneumatosis on CT scan s/p exlap, TAC w/ EI on 6/21/19     -NGT out 6/27; trying LRD today  -cont TPN one more day  -WOCN for new ileostomy  -OOB, PT, IS, lovenox    Dispo: Likely DC over the weekend or early next week

## 2019-06-29 NOTE — PLAN OF CARE
Problem: Adult Inpatient Plan of Care  Goal: Plan of Care Review  Outcome: Ongoing (interventions implemented as appropriate)  POC reviewed with pt who verbalized understanding. AAOx4. VSS on RA. Remains free of falls and injury. TPN and Lipids infusing throughout shift. No compaints of pain or nausea. Tolerating CL diet. Voiding per urinal. Resting well between care. SCDs in place. No acute events. No distress noted. Son at bedside. Will continue to monitor.

## 2019-06-29 NOTE — SUBJECTIVE & OBJECTIVE
Subjective:     Interval History: no acute events overnight. Tolerating clears. Ostomy with good function.     Post-Op Info:  Procedure(s) (LRB):  COLECTOMY, TOTAL, ABDOMINAL with end ileostomy (N/A)   8 Days Post-Op      Medications:  Continuous Infusions:   TPN ADULT CENTRAL LINE CUSTOM 75 mL/hr at 06/28/19 2137     Scheduled Meds:   enoxaparin  40 mg Subcutaneous Daily    famotidine (PF)  20 mg Intravenous BID    fat emulsion 20%  250 mL Intravenous Daily    sodium chloride 0.9%  10 mL Intravenous Q6H    tamsulosin  0.4 mg Oral Daily     PRN Meds:   HYDROmorphone    ibuprofen    ondansetron    oxyCODONE    simethicone    sodium chloride 0.9%        Objective:     Vital Signs (Most Recent):  Temp: 97.9 °F (36.6 °C) (06/29/19 0359)  Pulse: 64 (06/29/19 0359)  Resp: 16 (06/29/19 0359)  BP: (!) 144/64 (06/29/19 0359)  SpO2: 100 % (06/29/19 0359) Vital Signs (24h Range):  Temp:  [97.4 °F (36.3 °C)-98.5 °F (36.9 °C)] 97.9 °F (36.6 °C)  Pulse:  [64-83] 64  Resp:  [16-17] 16  SpO2:  [95 %-100 %] 100 %  BP: (112-152)/(61-93) 144/64     Intake/Output - Last 3 Shifts       06/27 0700 - 06/28 0659 06/28 0700 - 06/29 0659    P.O. 25 550    Other  0    TPN 1662.7 1560.3    Total Intake(mL/kg) 1687.7 (28.4) 2110.3 (35.5)    Urine (mL/kg/hr) 1150 (0.8) 500 (0.4)    Drains 200     Stool 50 25    Total Output 1400 525    Net +287.7 +1585.3          Urine Occurrence 3 x 3 x    Stool Occurrence 1 x 0 x          Physical Exam   Constitutional: He is oriented to person, place, and time. No distress.   Eyes: EOM are normal.   Neck: Neck supple.   Cardiovascular: Normal rate and regular rhythm.   Pulmonary/Chest: Effort normal. No respiratory distress.   Abdominal: Soft. There is no tenderness.   Distention improved, ileostomy in place-pink/viable, stool and gas in ostomy   Musculoskeletal: Normal range of motion.   Neurological: He is alert and oriented to person, place, and time.   Skin: Skin is warm and dry.      Significant Labs:  pending    Significant Diagnostics:  pending

## 2019-06-29 NOTE — PROGRESS NOTES
[facility]  Colorectal Surgery  Progress Note    Patient Name: Cale Anthony Sr.  MRN: [id number]  Admission Date: 6/21/2019  Hospital Length of Stay: 8 days  Attending Physician: Dino Rodríguez MD    Subjective:     Interval History: no acute events overnight. Tolerating clears. Ostomy with good function.     Post-Op Info:  Procedure(s) (LRB):  COLECTOMY, TOTAL, ABDOMINAL with end ileostomy (N/A)   8 Days Post-Op      Medications:  Continuous Infusions:   TPN ADULT CENTRAL LINE CUSTOM 75 mL/hr at 06/28/19 2137     Scheduled Meds:   enoxaparin  40 mg Subcutaneous Daily    famotidine (PF)  20 mg Intravenous BID    fat emulsion 20%  250 mL Intravenous Daily    sodium chloride 0.9%  10 mL Intravenous Q6H    tamsulosin  0.4 mg Oral Daily     PRN Meds:   HYDROmorphone    ibuprofen    ondansetron    oxyCODONE    simethicone    sodium chloride 0.9%        Objective:     Vital Signs (Most Recent):  Temp: 97.9 °F (36.6 °C) (06/29/19 0359)  Pulse: 64 (06/29/19 0359)  Resp: 16 (06/29/19 0359)  BP: (!) 144/64 (06/29/19 0359)  SpO2: 100 % (06/29/19 0359) Vital Signs (24h Range):  Temp:  [97.4 °F (36.3 °C)-98.5 °F (36.9 °C)] 97.9 °F (36.6 °C)  Pulse:  [64-83] 64  Resp:  [16-17] 16  SpO2:  [95 %-100 %] 100 %  BP: (112-152)/(61-93) 144/64     Intake/Output - Last 3 Shifts       06/27 0700 - 06/28 0659 06/28 0700 - 06/29 0659    P.O. 25 550    Other  0    TPN 1662.7 1560.3    Total Intake(mL/kg) 1687.7 (28.4) 2110.3 (35.5)    Urine (mL/kg/hr) 1150 (0.8) 500 (0.4)    Drains 200     Stool 50 25    Total Output 1400 525    Net +287.7 +1585.3          Urine Occurrence 3 x 3 x    Stool Occurrence 1 x 0 x          Physical Exam   Constitutional: He is oriented to person, place, and time. No distress.   Eyes: EOM are normal.   Neck: Neck supple.   Cardiovascular: Normal rate and regular rhythm.   Pulmonary/Chest: Effort normal. No respiratory distress.   Abdominal: Soft. There is no tenderness.   Distention  improved, ileostomy in place-pink/viable, stool and gas in ostomy   Musculoskeletal: Normal range of motion.   Neurological: He is alert and oriented to person, place, and time.   Skin: Skin is warm and dry.     Significant Labs:  pending    Significant Diagnostics:  pending    Assessment/Plan:     * Large bowel obstruction  Cale Anthony Sr. is a 88 y.o. male with LBO secondary to sigmoid colon mass with cecal pneumatosis on CT scan s/p exlap, TAC w/ EI on 6/21/19     -NGT out 6/27; trying LRD today  -cont TPN one more day  -WOCN for new ileostomy  -OOB, PT, IS, lovenox    Dispo: Likely DC over the weekend or early next week          Garrett Painting MD  Colorectal Surgery  [facility] [address]

## 2019-06-29 NOTE — PLAN OF CARE
Problem: Adult Inpatient Plan of Care  Goal: Plan of Care Review  Outcome: Ongoing (interventions implemented as appropriate)  Plan of Care reviewed w/ pt and family at bedside. AVSS on RA. Midline incision w/ staples JOSE CRUZ. RUQ ileostomy w/ liquid stool, changed this shift per WOC RN. TPN via R PICC. Advanced to low fiber/residue diet, tolerating small amts PO. Voiding CYU in urinal. Ambulating in kramer w/ x1 assist and RW. No c/o pain this shift.

## 2019-06-29 NOTE — NURSING
Ostomy pouch intact.     Son at bedside. Discussed products and care. Patient taking shower soon and showed son the stickers for the pouch.     Discussed with nurse Jess to assess pouch after shower and assist with changing as needed.    Supplies at bedside.     Patient denies any further questions at this time.  Jessy Sepulveda RN [facility]   [phone]

## 2019-06-30 VITALS
TEMPERATURE: 98 F | OXYGEN SATURATION: 100 % | HEIGHT: 65 IN | RESPIRATION RATE: 18 BRPM | WEIGHT: 131 LBS | BODY MASS INDEX: 21.83 KG/M2 | SYSTOLIC BLOOD PRESSURE: 116 MMHG | HEART RATE: 78 BPM | DIASTOLIC BLOOD PRESSURE: 63 MMHG

## 2019-06-30 LAB
ANION GAP SERPL CALC-SCNC: 8 MMOL/L (ref 8–16)
BASOPHILS # BLD AUTO: 0.06 K/UL (ref 0–0.2)
BASOPHILS NFR BLD: 0.8 % (ref 0–1.9)
BUN SERPL-MCNC: 10 MG/DL (ref 8–23)
CALCIUM SERPL-MCNC: 9.4 MG/DL (ref 8.7–10.5)
CHLORIDE SERPL-SCNC: 100 MMOL/L (ref 95–110)
CO2 SERPL-SCNC: 25 MMOL/L (ref 23–29)
CREAT SERPL-MCNC: 0.6 MG/DL (ref 0.5–1.4)
DIFFERENTIAL METHOD: ABNORMAL
EOSINOPHIL # BLD AUTO: 0.3 K/UL (ref 0–0.5)
EOSINOPHIL NFR BLD: 3.7 % (ref 0–8)
ERYTHROCYTE [DISTWIDTH] IN BLOOD BY AUTOMATED COUNT: 14.1 % (ref 11.5–14.5)
EST. GFR  (AFRICAN AMERICAN): >60 ML/MIN/1.73 M^2
EST. GFR  (NON AFRICAN AMERICAN): >60 ML/MIN/1.73 M^2
GLUCOSE SERPL-MCNC: 106 MG/DL (ref 70–110)
HCT VFR BLD AUTO: 31.3 % (ref 40–54)
HGB BLD-MCNC: 10.4 G/DL (ref 14–18)
IMM GRANULOCYTES # BLD AUTO: 0.09 K/UL (ref 0–0.04)
IMM GRANULOCYTES NFR BLD AUTO: 1.2 % (ref 0–0.5)
LYMPHOCYTES # BLD AUTO: 1.8 K/UL (ref 1–4.8)
LYMPHOCYTES NFR BLD: 24.1 % (ref 18–48)
MAGNESIUM SERPL-MCNC: 2.1 MG/DL (ref 1.6–2.6)
MCH RBC QN AUTO: 27.4 PG (ref 27–31)
MCHC RBC AUTO-ENTMCNC: 33.2 G/DL (ref 32–36)
MCV RBC AUTO: 83 FL (ref 82–98)
MONOCYTES # BLD AUTO: 0.6 K/UL (ref 0.3–1)
MONOCYTES NFR BLD: 8.2 % (ref 4–15)
NEUTROPHILS # BLD AUTO: 4.7 K/UL (ref 1.8–7.7)
NEUTROPHILS NFR BLD: 62 % (ref 38–73)
NRBC BLD-RTO: 0 /100 WBC
PHOSPHATE SERPL-MCNC: 4.4 MG/DL (ref 2.7–4.5)
PLATELET # BLD AUTO: 333 K/UL (ref 150–350)
PMV BLD AUTO: 9.8 FL (ref 9.2–12.9)
POTASSIUM SERPL-SCNC: 4.5 MMOL/L (ref 3.5–5.1)
RBC # BLD AUTO: 3.79 M/UL (ref 4.6–6.2)
SODIUM SERPL-SCNC: 133 MMOL/L (ref 136–145)
WBC # BLD AUTO: 7.55 K/UL (ref 3.9–12.7)

## 2019-06-30 PROCEDURE — 25000003 PHARM REV CODE 250: Performed by: STUDENT IN AN ORGANIZED HEALTH CARE EDUCATION/TRAINING PROGRAM

## 2019-06-30 PROCEDURE — 63600175 PHARM REV CODE 636 W HCPCS: Performed by: STUDENT IN AN ORGANIZED HEALTH CARE EDUCATION/TRAINING PROGRAM

## 2019-06-30 PROCEDURE — 25000003 PHARM REV CODE 250: Performed by: COLON & RECTAL SURGERY

## 2019-06-30 PROCEDURE — 80048 BASIC METABOLIC PNL TOTAL CA: CPT

## 2019-06-30 PROCEDURE — 85025 COMPLETE CBC W/AUTO DIFF WBC: CPT

## 2019-06-30 PROCEDURE — 83735 ASSAY OF MAGNESIUM: CPT

## 2019-06-30 PROCEDURE — A4216 STERILE WATER/SALINE, 10 ML: HCPCS | Performed by: COLON & RECTAL SURGERY

## 2019-06-30 PROCEDURE — 84100 ASSAY OF PHOSPHORUS: CPT

## 2019-06-30 PROCEDURE — S0028 INJECTION, FAMOTIDINE, 20 MG: HCPCS | Performed by: STUDENT IN AN ORGANIZED HEALTH CARE EDUCATION/TRAINING PROGRAM

## 2019-06-30 RX ORDER — OXYCODONE AND ACETAMINOPHEN 5; 325 MG/1; MG/1
1 TABLET ORAL EVERY 4 HOURS PRN
Qty: 18 TABLET | Refills: 0 | Status: SHIPPED | OUTPATIENT
Start: 2019-06-30 | End: 2019-07-08

## 2019-06-30 RX ADMIN — ENOXAPARIN SODIUM 40 MG: 100 INJECTION SUBCUTANEOUS at 09:06

## 2019-06-30 RX ADMIN — Medication 10 ML: at 12:06

## 2019-06-30 RX ADMIN — Medication 100 MG: at 09:06

## 2019-06-30 RX ADMIN — Medication 10 ML: at 06:06

## 2019-06-30 RX ADMIN — TAMSULOSIN HYDROCHLORIDE 0.4 MG: 0.4 CAPSULE ORAL at 09:06

## 2019-06-30 RX ADMIN — FAMOTIDINE 20 MG: 10 INJECTION, SOLUTION INTRAVENOUS at 09:06

## 2019-06-30 NOTE — PROGRESS NOTES
[facility]  Colorectal Surgery  Progress Note    Patient Name: Cale Anthony Sr.  MRN: [id number]  Admission Date: 6/21/2019  Hospital Length of Stay: 9 days  Attending Physician: Dino Rodríguez MD    Subjective:     Interval History: No acute events overnight. Tolerating diet. Good ostomy output. Pain well controlled.     Post-Op Info:  Procedure(s) (LRB):  COLECTOMY, TOTAL, ABDOMINAL with end ileostomy (N/A)   9 Days Post-Op      Medications:  Continuous Infusions:   TPN ADULT CENTRAL LINE CUSTOM 75 mL/hr at 06/29/19 2056     Scheduled Meds:   enoxaparin  40 mg Subcutaneous Daily    famotidine (PF)  20 mg Intravenous BID    fat emulsion 20%  250 mL Intravenous Daily    sodium chloride 0.9%  10 mL Intravenous Q6H    tamsulosin  0.4 mg Oral Daily    thiamine  100 mg Oral Daily     PRN Meds:   HYDROmorphone    ibuprofen    ondansetron    oxyCODONE    simethicone    sodium chloride 0.9%        Objective:     Vital Signs (Most Recent):  Temp: 98.1 °F (36.7 °C) (06/30/19 0400)  Pulse: 76 (06/30/19 0400)  Resp: 18 (06/30/19 0400)  BP: 126/79 (06/30/19 0400)  SpO2: 99 % (06/30/19 0400) Vital Signs (24h Range):  Temp:  [96.1 °F (35.6 °C)-98.6 °F (37 °C)] 98.1 °F (36.7 °C)  Pulse:  [67-85] 76  Resp:  [16-18] 18  SpO2:  [97 %-100 %] 99 %  BP: (122-146)/(56-87) 126/79     Intake/Output - Last 3 Shifts       06/28 0700 - 06/29 0659 06/29 0700 - 06/30 0659 06/30 0700 - 07/01 0659    P.O. 550 950     Other 0      TPN 1560.3 1599.4     Total Intake(mL/kg) 2110.3 (35.5) 2549.4 (42.9)     Urine (mL/kg/hr) 500 (0.4) 600 (0.4)     Drains       Stool 25 725     Total Output 525 1325     Net +1585.3 +1224.4            Urine Occurrence 3 x 7 x     Stool Occurrence 0 x 0 x           Physical Exam   Constitutional: He is oriented to person, place, and time. No distress.   Eyes: EOM are normal.   Neck: Neck supple.   Cardiovascular: Normal rate and regular rhythm.   Pulmonary/Chest: Effort normal. No  respiratory distress.   Abdominal: Soft. There is no tenderness.   Distention improved, ileostomy in place-pink/viable, stool and gas in ostomy   Musculoskeletal: Normal range of motion.   Neurological: He is alert and oriented to person, place, and time.   Skin: Skin is warm and dry.     Significant Labs:  CBC:   Recent Labs   Lab 06/30/19 0416   WBC 7.55   RBC 3.79*   HGB 10.4*   HCT 31.3*      MCV 83   MCH 27.4   MCHC 33.2     CMP:   Recent Labs   Lab 06/30/19 0416      CALCIUM 9.4   *   K 4.5   CO2 25      BUN 10   CREATININE 0.6       Significant Diagnostics:  I have reviewed all pertinent imaging results/findings within the past 24 hours.    Assessment/Plan:     * Large bowel obstruction  Cale Anthony . is a 88 y.o. male with LBO secondary to sigmoid colon mass with cecal pneumatosis on CT scan s/p exlap, TAC w/ EI on 6/21/19     -NGT out 6/27; continued LRD  -wean TPN  -WOCN for new ileostomy  -OOB, PT, IS, lovenox    Dispo: Likely d/c today with home health.       Garrett Painting MD  Colorectal Surgery  [facility]

## 2019-06-30 NOTE — ASSESSMENT & PLAN NOTE
Cale Anthony . is a 88 y.o. male with LBO secondary to sigmoid colon mass with cecal pneumatosis on CT scan s/p exlap, TAC w/ EI on 6/21/19     -NGT out 6/27; continued LRD  -wean TPN  -WOCN for new ileostomy  -OOB, PT, IS, lovenox    Dispo: Likely d/c today with home health.

## 2019-06-30 NOTE — DISCHARGE INSTRUCTIONS
Important: if your ileostomy is putting out more than 1L or 1000ml of fluid a day, please call the office so we can advise you on how to get this to slow down.       Discharge Instructions for Total Abdominal Colectomy  A total abdominal colectomy is surgery to remove your colon. Your colon, also called the large intestine, is part of your bowel. A colectomy is done to remove disease, such as cancer, polyps, and inflammatory bowel disease, and to relieve the symptoms you have been having, such as bleeding, blockage, and pain.  Activity  · Ask your friends and family to help with chores and errands while you recover.  · Walk on a regular basis. Start with short walks each day. Gradually increase the distance you walk and how often you walk.  · Dont lift anything heavier than 10 pounds for the first 6 weeks after your surgery.  · Dont drive for 2 weeks after surgery or as directed by your doctor. Dont drive while you are taking prescription pain medicine.  · Ask your doctor when you can expect to return to work. Most people are able to return to work within 4 to 6 weeks after surgery.  Other home care  · Diarrhea or loose stools are common after surgery, and can last weeks to months. If you have watery, or bloody diarrhea, call your surgeon. This may be a sign of a bowel infection or other problem.  · Follow the diet prescribed for you in the hospital. Slowly add foods until you get back to your regular diet. If a food gives you stomach or bowel problems, avoid it for a while.  · Initially, you may be on a low fiber diet. After this, adding fiber can help with the diarrhea. If it is severe, your doctor may add a medicine for the diarrhea as well.  · You may use pain medicine as directed by your provider. Discuss your best option before leaving the hospital and at your post operative visit.  · Use nutritional supplements or shakes as directed by your doctor.  · Drink at least 8 glasses of water every day, unless  directed otherwise. It's very important to avoid dehydration, especially if you have an ostomy (a bag that collects stool) or diarrhea.   · Take your medicines exactly as directed. Dont skip doses.  · Shower or bathe as directed by your healthcare provider. Gently wash your incision with soap and water and pat dry.  · Avoid tub baths until the staples in your incision have been removed.  Follow-up care  Make a follow-up appointment as directed by our staff.     When to call your healthcare provider  Call your healthcare provider right away if you have any of the following:  · Fever of 100.4°F (38°C) or higher, or as directed by your healthcare provider  · Diarrhea that lasts more than 3 days  · Nausea and vomiting that wont go away  · Pain in your abdomen that gets worse or isnt relieved by pain medicine  · Drainage or redness around your incision  · Bright red or dark black stools   Date Last Reviewed: 1/1/2017 © 2000-2017 [employer]. [address] [place] [address]. All rights reserved. This information is not intended as a substitute for professional medical care. Always follow your healthcare professional's instructions.        Discharge Instructions for Ileostomy  During an ileostomy, a surgeon removes the colon (large intestine) and part of the last section of the ileum (small intestine) if they are diseased. The surgeon may also disconnect parts of the intestine if they have been injured. Disconnection allows time for injured intestines to heal; then they are reconnected. In other cases, the ileostomy is permanent. During the ileostomy, the end of the ileum is brought through the abdominal wall. This makes an opening, called a stoma, for the contents of your intestines and mucus to pass out of the body. The following are general guidelines to follow after your ileostomy. Your healthcare provider and ostomy nurse will go over any information that is specific to your  condition.  Activity  Tips for activities include the following:   · Dont lift anything heavier than 5 pounds until your healthcare provider says it is OK.  · Dont drive until after your first healthcare provider's appointment following your surgery.  · If you ride in a car for more than short trips, stop often to stretch your legs.  · Ask your healthcare provider about when you can expect to return to work. Most people are able to return to work within 4 to 6 weeks after surgery.  · Increase your activity gradually. Take short walks on a level surface.  · Dont overexert yourself to the point of fatigue. If you become tired, rest.  Other home care  Suggestions for home care:  · Take care of your stoma as directed. Your healthcare provider and ostomy nurse discussed how to do this with you before you left the hospital.  · Ask your healthcare provider or ostomy nurse for a patient education sheet about ileostomy care before you leave the hospital. This will help remind you how to care for yourself. A wound-ostomy-continence nurse will likely see you before and after surgery for questions and teaching. Let the nurse know if you want a significant other to be present for the education on your ostomy care.   · Ask your healthcare provider to prescribe medicines to reduce the output from your ostomy if necessary.  · Dont be alarmed by bowel movements that contain mucus. This is common following this procedure. Increased gassiness is also common.   · Shower or bathe as instructed by your healthcare provider.  · Wash the incision site with soap and water and pat dry.  · Check your incision every day for redness, drainage, swelling, or separation of the skin.  · Dont take any over-the-counter medicine unless your healthcare provider tells you to do so.     When to call your healthcare provider  Call your healthcare provider right away if you have any of the following:  · Excessive bleeding from your stoma  · A change in  your stoma's color or a stoma that looks like it's getting longer   · Bulging skin around your stoma  · Blood in your stool  · Fever above 100.4°F (38.0°C) or higher, or as directed by your healthcare provider  · Redness, swelling, bleeding, or drainage from your incision  · Constipation or diarrhea  · Nausea or vomiting  · Increased pain in the belly or around the stoma   Date Last Reviewed: 7/1/2016 © 2000-2017 [employer]. [address] [place] [address]. All rights reserved. This information is not intended as a substitute for professional medical care. Always follow your healthcare professional's instructions.

## 2019-06-30 NOTE — PLAN OF CARE
Problem: Adult Inpatient Plan of Care  Goal: Plan of Care Review  Outcome: Ongoing (interventions implemented as appropriate)  POC reviewed with pt who verbalized understanding. AAOx4. VSS on RA. Remains free of falls and injury. TPN and Lipids infusing throughout shift. No compaints of pain or nausea. Tolerating diet. R ileostomy in place. Voiding per urinal. Resting well between care. SCDs in place. No acute events. No distress noted. Daughter at bedside. Will continue to monitor.

## 2019-06-30 NOTE — SUBJECTIVE & OBJECTIVE
Subjective:     Interval History: No acute events overnight. Tolerating diet. Good ostomy output. Pain well controlled.     Post-Op Info:  Procedure(s) (LRB):  COLECTOMY, TOTAL, ABDOMINAL with end ileostomy (N/A)   9 Days Post-Op      Medications:  Continuous Infusions:   TPN ADULT CENTRAL LINE CUSTOM 75 mL/hr at 06/29/19 2056     Scheduled Meds:   enoxaparin  40 mg Subcutaneous Daily    famotidine (PF)  20 mg Intravenous BID    fat emulsion 20%  250 mL Intravenous Daily    sodium chloride 0.9%  10 mL Intravenous Q6H    tamsulosin  0.4 mg Oral Daily    thiamine  100 mg Oral Daily     PRN Meds:   HYDROmorphone    ibuprofen    ondansetron    oxyCODONE    simethicone    sodium chloride 0.9%        Objective:     Vital Signs (Most Recent):  Temp: 98.1 °F (36.7 °C) (06/30/19 0400)  Pulse: 76 (06/30/19 0400)  Resp: 18 (06/30/19 0400)  BP: 126/79 (06/30/19 0400)  SpO2: 99 % (06/30/19 0400) Vital Signs (24h Range):  Temp:  [96.1 °F (35.6 °C)-98.6 °F (37 °C)] 98.1 °F (36.7 °C)  Pulse:  [67-85] 76  Resp:  [16-18] 18  SpO2:  [97 %-100 %] 99 %  BP: (122-146)/(56-87) 126/79     Intake/Output - Last 3 Shifts       06/28 0700 - 06/29 0659 06/29 0700 - 06/30 0659 06/30 0700 - 07/01 0659    P.O. 550 950     Other 0      TPN 1560.3 1599.4     Total Intake(mL/kg) 2110.3 (35.5) 2549.4 (42.9)     Urine (mL/kg/hr) 500 (0.4) 600 (0.4)     Drains       Stool 25 725     Total Output 525 1325     Net +1585.3 +1224.4            Urine Occurrence 3 x 7 x     Stool Occurrence 0 x 0 x           Physical Exam   Constitutional: He is oriented to person, place, and time. No distress.   Eyes: EOM are normal.   Neck: Neck supple.   Cardiovascular: Normal rate and regular rhythm.   Pulmonary/Chest: Effort normal. No respiratory distress.   Abdominal: Soft. There is no tenderness.   Distention improved, ileostomy in place-pink/viable, stool and gas in ostomy   Musculoskeletal: Normal range of motion.   Neurological: He is alert and  oriented to person, place, and time.   Skin: Skin is warm and dry.     Significant Labs:  CBC:   Recent Labs   Lab 06/30/19 0416   WBC 7.55   RBC 3.79*   HGB 10.4*   HCT 31.3*      MCV 83   MCH 27.4   MCHC 33.2     CMP:   Recent Labs   Lab 06/30/19 0416      CALCIUM 9.4   *   K 4.5   CO2 25      BUN 10   CREATININE 0.6       Significant Diagnostics:  I have reviewed all pertinent imaging results/findings within the past 24 hours.

## 2019-06-30 NOTE — HOSPITAL COURSE
Patient underwent a total abdominal colectomy which he tolerated very well. His post operative course was complicated by an ileus which eventually resolved. By time of discharge patient was having adequate ostomy output, good pain control, was tolerating a diet, and was working well with PT and ambulating in the halls. Stable for discharge for close follow up.

## 2019-06-30 NOTE — PLAN OF CARE
[facility]    HOME HEALTH ORDERS  FACE TO FACE ENCOUNTER    Patient Name: Cale Anthony Sr.  YOB: 1930    PCP: Mal Santoyo MD   PCP Address: [address] / [place] [address]  PCP Phone Number: [phone]  PCP Fax: [phone]    Encounter Date: 06/30/2019    Admit to Home Health    Diagnoses:  Active Hospital Problems    Diagnosis  POA    *Large bowel obstruction [K56.609]  Unknown    Severe malnutrition [E43]  Yes    Ileus following gastrointestinal surgery [K91.30]  No    Pneumatosis intestinalis of large intestine [K63.89]  Yes      Resolved Hospital Problems   No resolved problems to display.       Future Appointments   Date Time Provider Department Center   7/9/2019  7:15 AM LAB, [facility] [place]     Follow-up Information     Dino Rodríguez MD In 2 weeks.    Specialty:  Colon and Rectal Surgery  Why:  For wound re-check  Contact information:  [address] [facility] [address]  [phone]                     I have seen and examined this patient face to face today. My clinical findings that support the need for the home health skilled services and home bound status are the following:  Weakness/numbness causing balance and gait disturbance due to Weakness/Debility, Anemia and Surgery making it taxing to leave home.    Allergies:Review of patient's allergies indicates:  No Known Allergies    Diet: regular diet    Activities: activity as tolerated, no driving while on analgesics and no heavy lifting for 6 weeks from day of surgery    Nursing:   SN to complete comprehensive assessment including routine vital signs. Instruct on disease process and s/s of complications to report to MD. Review/verify medication list sent home with the patient at time of discharge  and instruct patient/caregiver as needed. Frequency may be adjusted depending on start of care date.    Notify MD if SBP > 160 or < 90; DBP > 90 or < 50; HR > 120 or < 50; Temp >  101;      CONSULTS:    Physical Therapy to evaluate and treat. Evaluate for home safety and equipment needs; Establish/upgrade home exercise program. Perform / instruct on therapeutic exercises, gait training, transfer training, and Range of Motion.  Occupational Therapy to evaluate and treat. Evaluate home environment for safety and equipment needs. Perform/Instruct on transfers, ADL training, ROM, and therapeutic exercises.  Aide to provide assistance with personal care, ADLs, and vital signs.    MISCELLANEOUS CARE:  Colostomy Care:  Instruct patient/caregiver to empty bag when full and PRN. and Monitor skin integrity.    WOUND CARE ORDERS  n/a      Medications: Review discharge medications with patient and family and provide education.      Current Discharge Medication List      START taking these medications    Details   oxyCODONE-acetaminophen (PERCOCET) 5-325 mg per tablet Take 1 tablet by mouth every 4 (four) hours as needed for Pain.  Qty: 18 tablet, Refills: 0         CONTINUE these medications which have NOT CHANGED    Details   ergocalciferol (ERGOCALCIFEROL) 50,000 unit Cap Take 1 capsule (50,000 Units total) by mouth every 7 days.  Qty: 12 capsule, Refills: 3    Associated Diagnoses: Vitamin D deficiency      omeprazole (PRILOSEC) 20 MG capsule TAKE 1 CAPSULE BY MOUTH EVERY DAY AS NEEDED  Qty: 30 capsule, Refills: 0    Associated Diagnoses: Gastroesophageal reflux disease, esophagitis presence not specified      omeprazole 20 mg TbEC Take 1 tablet by mouth daily as needed.  Qty: 30 each, Refills: 2    Associated Diagnoses: Gastroesophageal reflux disease, esophagitis presence not specified         STOP taking these medications       docusate sodium (COLACE) 100 MG capsule Comments:   Reason for Stopping:         sulfamethoxazole-trimethoprim 800-160mg (BACTRIM DS) 800-160 mg Tab Comments:   Reason for Stopping:               I certify that this patient is confined to his home and needs intermittent  skilled nursing care, physical therapy and occupational therapy.

## 2019-06-30 NOTE — DISCHARGE SUMMARY
"[facility]  Colorectal Surgery  Discharge Summary      Patient Name: Cale Anthony Sr.  MRN: [id number]  Admission Date: 6/21/2019  Hospital Length of Stay: 9 days  Discharge Date and Time:  06/30/2019 10:35 AM  Attending Physician: Dino Rodríguez MD   Discharging Provider: Garrett Painting MD  Primary Care Provider: Mal Santoyo MD     HPI:  Cale Anthony Sr. is a 88 y.o. male with no known medical history (hasn't seen a doctor for most of his life) who presents with progressive anorexia, inability to tolerate PO with N/V, and constipation. He established care with a PCP about a month ago and was tried on various bowel regimens without relief. He now presents with about 10 days without a bowel movement although he continues to pass flatus. Some abdominal cramping but no "pain." Has lost ~60 pounds in the past year. Never had a colonoscopy. Family history significant for colon cancer in a brother at age ~80.    Procedure(s) (LRB):  COLECTOMY, TOTAL, ABDOMINAL with end ileostomy (N/A)     Hospital Course:  Patient underwent a total abdominal colectomy which he tolerated very well. His post operative course was complicated by an ileus which eventually resolved. By time of discharge patient was having adequate ostomy output, good pain control, was tolerating a diet, and was working well with PT and ambulating in the halls. Stable for discharge for close follow up.     Consults (From admission, onward)        Status Ordering Provider     Inpatient consult to Colorectal Surgery  Once     Provider:  (Not yet assigned)    Completed TRAY NAVA     Inpatient consult to PICC team ([facility])  Once     Provider:  (Not yet assigned)    Completed MAX HERNANDEZ          Significant Diagnostic Studies: Labs:   CMP   Recent Labs   Lab 06/29/19  0455 06/30/19  0416   * 133*   K 4.4 4.5    100   CO2 26 25    106   BUN 10 10   CREATININE 0.6 0.6   CALCIUM 9.0 9.4   ANIONGAP 6* 8 "   ESTGFRAFRICA >60.0 >60.0   EGFRNONAA >60.0 >60.0    and CBC   Recent Labs   Lab 06/29/19  0455 06/30/19  0416   WBC 8.09 7.55   HGB 10.0* 10.4*   HCT 30.2* 31.3*    333       Pending Diagnostic Studies:     None        Final Active Diagnoses:    Diagnosis Date Noted POA    PRINCIPAL PROBLEM:  Large bowel obstruction [K56.609] 06/21/2019 Unknown    Severe malnutrition [E43] 06/26/2019 Yes    Ileus following gastrointestinal surgery [K91.30] 06/25/2019 No    Pneumatosis intestinalis of large intestine [K63.89]  Yes      Problems Resolved During this Admission:      Discharged Condition: good    Disposition: Home or Self Care    Follow Up:  Follow-up Information     Dino Rodríguez MD In 2 weeks.    Specialty:  Colon and Rectal Surgery  Why:  For wound re-check  Contact information:  [address]  [facility] [address]  [phone]                 Patient Instructions:      Diet Adult Regular     Notify your health care provider if you experience any of the following:  temperature >100.4     Notify your health care provider if you experience any of the following:  persistent nausea and vomiting or diarrhea     Notify your health care provider if you experience any of the following:  severe uncontrolled pain     Notify your health care provider if you experience any of the following:  redness, tenderness, or signs of infection (pain, swelling, redness, odor or green/yellow discharge around incision site)     Notify your health care provider if you experience any of the following:  difficulty breathing or increased cough     Notify your health care provider if you experience any of the following:  severe persistent headache     Notify your health care provider if you experience any of the following:  worsening rash     Notify your health care provider if you experience any of the following:  persistent dizziness, light-headedness, or visual disturbances     Notify your health care provider if you  experience any of the following:  increased confusion or weakness     Activity as tolerated   Order Comments: No lifting greater than 10 pounds for 6 weeks from day of surgery.  No pushing/pulling such as vacuuming or raking.  No straining, avoid constipation and take stool softeners as described and laxatives as needed.  No driving while on narcotics and until you can react quickly without pain.     Shower on day dressing removed (No bath)   Order Comments: You can shower when you get home. In the shower, it is okay to let warm soapy water rinse over your wound. Do not submerge your wound in any tubs, baths, spas, or pools.     Medications:  Reconciled Home Medications:      Medication List      START taking these medications    oxyCODONE-acetaminophen 5-325 mg per tablet  Commonly known as:  PERCOCET  Take 1 tablet by mouth every 4 (four) hours as needed for Pain.        CONTINUE taking these medications    ergocalciferol 50,000 unit Cap  Commonly known as:  ERGOCALCIFEROL  Take 1 capsule (50,000 Units total) by mouth every 7 days.     * omeprazole 20 mg Tbec  Take 1 tablet by mouth daily as needed.     * omeprazole 20 MG capsule  Commonly known as:  PRILOSEC  TAKE 1 CAPSULE BY MOUTH EVERY DAY AS NEEDED         * This list has 2 medication(s) that are the same as other medications prescribed for you. Read the directions carefully, and ask your doctor or other care provider to review them with you.            STOP taking these medications    docusate sodium 100 MG capsule  Commonly known as:  COLACE     sulfamethoxazole-trimethoprim 800-160mg 800-160 mg Tab  Commonly known as:  BACTRIM DS            Garrett Painting MD  Colorectal Surgery  [facility]

## 2019-06-30 NOTE — NURSING
Discharge Plans reviewed w/ pt and fanily at bedside. AVSS on RA. TPN stopped this shift. R PICC removed, no s/s complications. All Rx given to pt. Awaiting transport.

## 2019-07-01 NOTE — PLAN OF CARE
GEOFF meyer faxed cosigned HH orders to [facility] at [phone].    Sanjana Smith LMSW  [facility]  [id number]

## 2019-07-01 NOTE — PLAN OF CARE
7/1/19; 1317 This CM received call from Leslee Sanders with [facility] requesting CM/SW to send MD signed HH orders via fax -# [phone]    CM informed SW of request. SW will send request to [facility].

## 2019-07-02 ENCOUNTER — TELEPHONE (OUTPATIENT)
Dept: SURGERY | Facility: CLINIC | Age: 84
End: 2019-07-02

## 2019-07-02 PROCEDURE — G0180 PR HOME HEALTH MD CERTIFICATION: ICD-10-PCS | Mod: ,,, | Performed by: COLON & RECTAL SURGERY

## 2019-07-02 PROCEDURE — G0180 MD CERTIFICATION HHA PATIENT: HCPCS | Mod: ,,, | Performed by: COLON & RECTAL SURGERY

## 2019-07-02 NOTE — TELEPHONE ENCOUNTER
----- Message from Chris Millard sent at 7/2/2019 12:37 PM CDT -----  Contact: pt son Willie Tapia called in about wanting to talk to the office. Pt has a procedure done. Willie wants to see what the results were.        Willie can be reached at [phone]        TY

## 2019-07-03 NOTE — PHYSICIAN QUERY
PT Name: Cale Anthony Sr.  MR #: [id number]    Physician Query Form - Pathology Findings Clarification     CDS/: Nicky Guzman RN, CDS               Contact information: [email or web address]                         [phone]  This form is a permanent document in the medical record.     Query Date: July 3, 2019      By submitting this query, we are merely seeking further clarification of documentation.  Please utilize your independent clinical judgment when addressing the question(s) below.      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 Kuo/ Dr. Rodríguez    Path Report       Resulted: 7/2     Please document the clinical significance of the Pathologists findings of __Adenocarcinoma__.    [ x  ] I agree with the Pathology Findings   [   ] I do not agree with the Pathology Findings   [   ] Other/Clarification of Findings:   [  ] Clinically Undetermined       Please document in your progress notes daily for the duration of treatment until resolved and include in your discharge summary.

## 2019-07-05 ENCOUNTER — HOSPITAL ENCOUNTER (EMERGENCY)
Facility: HOSPITAL | Age: 84
Discharge: HOME | End: 2019-07-05
Attending: EMERGENCY MEDICINE
Payer: MEDICARE

## 2019-07-05 VITALS
SYSTOLIC BLOOD PRESSURE: 142 MMHG | OXYGEN SATURATION: 100 % | WEIGHT: 115 LBS | TEMPERATURE: 99 F | DIASTOLIC BLOOD PRESSURE: 77 MMHG | HEART RATE: 79 BPM | HEIGHT: 65 IN | RESPIRATION RATE: 16 BRPM | BODY MASS INDEX: 19.16 KG/M2

## 2019-07-05 DIAGNOSIS — R11.2 NAUSEA & VOMITING: Primary | ICD-10-CM

## 2019-07-05 DIAGNOSIS — R11.2 POST-OPERATIVE NAUSEA AND VOMITING: ICD-10-CM

## 2019-07-05 DIAGNOSIS — Z98.890 POST-OPERATIVE NAUSEA AND VOMITING: ICD-10-CM

## 2019-07-05 DIAGNOSIS — N30.00 ACUTE CYSTITIS WITHOUT HEMATURIA: ICD-10-CM

## 2019-07-05 LAB
ALBUMIN SERPL BCP-MCNC: 3.9 G/DL (ref 3.5–5.2)
ALP SERPL-CCNC: 81 U/L (ref 55–135)
ALT SERPL W/O P-5'-P-CCNC: 82 U/L (ref 10–44)
ANION GAP SERPL CALC-SCNC: 9 MMOL/L (ref 8–16)
AST SERPL-CCNC: 27 U/L (ref 10–40)
BACTERIA #/AREA URNS AUTO: NORMAL /HPF
BASOPHILS # BLD AUTO: 0.05 K/UL (ref 0–0.2)
BASOPHILS NFR BLD: 0.4 % (ref 0–1.9)
BILIRUB SERPL-MCNC: 0.6 MG/DL (ref 0.1–1)
BILIRUB UR QL STRIP: NEGATIVE
BUN SERPL-MCNC: 16 MG/DL (ref 6–30)
BUN SERPL-MCNC: 20 MG/DL (ref 8–23)
CALCIUM SERPL-MCNC: 11.3 MG/DL (ref 8.7–10.5)
CHLORIDE SERPL-SCNC: 103 MMOL/L (ref 95–110)
CHLORIDE SERPL-SCNC: 98 MMOL/L (ref 95–110)
CLARITY UR REFRACT.AUTO: ABNORMAL
CO2 SERPL-SCNC: 25 MMOL/L (ref 23–29)
COLOR UR AUTO: YELLOW
CREAT SERPL-MCNC: 0.6 MG/DL (ref 0.5–1.4)
CREAT SERPL-MCNC: 0.9 MG/DL (ref 0.5–1.4)
DIFFERENTIAL METHOD: ABNORMAL
EOSINOPHIL # BLD AUTO: 0 K/UL (ref 0–0.5)
EOSINOPHIL NFR BLD: 0.3 % (ref 0–8)
ERYTHROCYTE [DISTWIDTH] IN BLOOD BY AUTOMATED COUNT: 14.4 % (ref 11.5–14.5)
EST. GFR  (AFRICAN AMERICAN): >60 ML/MIN/1.73 M^2
EST. GFR  (NON AFRICAN AMERICAN): >60 ML/MIN/1.73 M^2
GLUCOSE SERPL-MCNC: 115 MG/DL (ref 70–110)
GLUCOSE SERPL-MCNC: 91 MG/DL (ref 70–110)
GLUCOSE UR QL STRIP: ABNORMAL
HCT VFR BLD AUTO: 41.4 % (ref 40–54)
HCT VFR BLD CALC: 33 %PCV (ref 36–54)
HGB BLD-MCNC: 13.7 G/DL (ref 14–18)
HGB UR QL STRIP: ABNORMAL
HYALINE CASTS UR QL AUTO: 0 /LPF
IMM GRANULOCYTES # BLD AUTO: 0.11 K/UL (ref 0–0.04)
IMM GRANULOCYTES NFR BLD AUTO: 0.9 % (ref 0–0.5)
KETONES UR QL STRIP: ABNORMAL
LEUKOCYTE ESTERASE UR QL STRIP: NEGATIVE
LIPASE SERPL-CCNC: 104 U/L (ref 4–60)
LYMPHOCYTES # BLD AUTO: 1.4 K/UL (ref 1–4.8)
LYMPHOCYTES NFR BLD: 11.8 % (ref 18–48)
MCH RBC QN AUTO: 27.1 PG (ref 27–31)
MCHC RBC AUTO-ENTMCNC: 33.1 G/DL (ref 32–36)
MCV RBC AUTO: 82 FL (ref 82–98)
MICROSCOPIC COMMENT: NORMAL
MONOCYTES # BLD AUTO: 0.5 K/UL (ref 0.3–1)
MONOCYTES NFR BLD: 4.3 % (ref 4–15)
NEUTROPHILS # BLD AUTO: 9.6 K/UL (ref 1.8–7.7)
NEUTROPHILS NFR BLD: 82.3 % (ref 38–73)
NITRITE UR QL STRIP: POSITIVE
NRBC BLD-RTO: 0 /100 WBC
PH UR STRIP: 6 [PH] (ref 5–8)
PLATELET # BLD AUTO: 489 K/UL (ref 150–350)
PMV BLD AUTO: 9.6 FL (ref 9.2–12.9)
POC IONIZED CALCIUM: 1.24 MMOL/L (ref 1.06–1.42)
POC TCO2 (MEASURED): 24 MMOL/L (ref 23–29)
POTASSIUM BLD-SCNC: 4.2 MMOL/L (ref 3.5–5.1)
POTASSIUM SERPL-SCNC: 5.6 MMOL/L (ref 3.5–5.1)
PROT SERPL-MCNC: 8.6 G/DL (ref 6–8.4)
PROT UR QL STRIP: ABNORMAL
RBC # BLD AUTO: 5.05 M/UL (ref 4.6–6.2)
RBC #/AREA URNS AUTO: 3 /HPF (ref 0–4)
SAMPLE: ABNORMAL
SODIUM BLD-SCNC: 135 MMOL/L (ref 136–145)
SODIUM SERPL-SCNC: 132 MMOL/L (ref 136–145)
SP GR UR STRIP: 1.02 (ref 1–1.03)
SQUAMOUS #/AREA URNS AUTO: 0 /HPF
URN SPEC COLLECT METH UR: ABNORMAL
WBC # BLD AUTO: 11.62 K/UL (ref 3.9–12.7)
WBC #/AREA URNS AUTO: 1 /HPF (ref 0–5)

## 2019-07-05 PROCEDURE — 99284 PR EMERGENCY DEPT VISIT,LEVEL IV: ICD-10-PCS | Mod: ,,, | Performed by: PHYSICIAN ASSISTANT

## 2019-07-05 PROCEDURE — 63600175 PHARM REV CODE 636 W HCPCS: Performed by: PHYSICIAN ASSISTANT

## 2019-07-05 PROCEDURE — 99284 EMERGENCY DEPT VISIT MOD MDM: CPT | Mod: ,,, | Performed by: PHYSICIAN ASSISTANT

## 2019-07-05 PROCEDURE — 25000003 PHARM REV CODE 250: Performed by: PHYSICIAN ASSISTANT

## 2019-07-05 PROCEDURE — 99285 EMERGENCY DEPT VISIT HI MDM: CPT | Mod: 25

## 2019-07-05 PROCEDURE — 80053 COMPREHEN METABOLIC PANEL: CPT

## 2019-07-05 PROCEDURE — 93005 ELECTROCARDIOGRAM TRACING: CPT

## 2019-07-05 PROCEDURE — 96361 HYDRATE IV INFUSION ADD-ON: CPT

## 2019-07-05 PROCEDURE — 93010 EKG 12-LEAD: ICD-10-PCS | Mod: ,,, | Performed by: INTERNAL MEDICINE

## 2019-07-05 PROCEDURE — 83690 ASSAY OF LIPASE: CPT

## 2019-07-05 PROCEDURE — 93010 ELECTROCARDIOGRAM REPORT: CPT | Mod: ,,, | Performed by: INTERNAL MEDICINE

## 2019-07-05 PROCEDURE — 96374 THER/PROPH/DIAG INJ IV PUSH: CPT

## 2019-07-05 PROCEDURE — 80047 BASIC METABLC PNL IONIZED CA: CPT | Mod: 91

## 2019-07-05 PROCEDURE — 85025 COMPLETE CBC W/AUTO DIFF WBC: CPT

## 2019-07-05 PROCEDURE — 81001 URINALYSIS AUTO W/SCOPE: CPT

## 2019-07-05 RX ORDER — CEFTRIAXONE 1 G/1
1 INJECTION, POWDER, FOR SOLUTION INTRAMUSCULAR; INTRAVENOUS
Status: COMPLETED | OUTPATIENT
Start: 2019-07-05 | End: 2019-07-05

## 2019-07-05 RX ORDER — CEPHALEXIN 500 MG/1
500 CAPSULE ORAL EVERY 12 HOURS
Qty: 14 CAPSULE | Refills: 0 | Status: SHIPPED | OUTPATIENT
Start: 2019-07-05 | End: 2019-07-12

## 2019-07-05 RX ORDER — LOPERAMIDE HYDROCHLORIDE 2 MG/1
2 CAPSULE ORAL 2 TIMES DAILY
Qty: 30 CAPSULE | Refills: 0 | Status: SHIPPED | OUTPATIENT
Start: 2019-07-05 | End: 2019-07-15

## 2019-07-05 RX ADMIN — SODIUM CHLORIDE 1000 ML: 0.9 INJECTION, SOLUTION INTRAVENOUS at 11:07

## 2019-07-05 RX ADMIN — CEFTRIAXONE SODIUM 1 G: 1 INJECTION, POWDER, FOR SOLUTION INTRAMUSCULAR; INTRAVENOUS at 11:07

## 2019-07-05 RX ADMIN — SODIUM CHLORIDE 1000 ML: 0.9 INJECTION, SOLUTION INTRAVENOUS at 10:07

## 2019-07-05 NOTE — ED TRIAGE NOTES
Pt reports colon resection approx. 1 week ago with decreased urine output since surgery. C/o n/v beginning last night. Denies abd pain. Denies chest pain. Denies SOB. Denies fever.

## 2019-07-05 NOTE — PROVIDER PROGRESS NOTES - EMERGENCY DEPT.
Encounter Date: 7/5/2019    ED Physician Progress Notes           ED Attending Sign-out Progress Note:  Patient signed out to me at shift change by Dr. Chacko in care of [facility] to f/u repeat chemistry, PO challenge, CRS dispo and overall dispo. Expectation is that pt would be discharged home with prescription for antibiotics for UTI.    BMP w/ K 42, iCalcium 1.24 (normal).  CRS recommends d/c home.  Passed PO challenger per PA.  Discharging on keflex.

## 2019-07-05 NOTE — CONSULTS
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 Presents today 2 weeks after his surgery, discharged on 6/30. Presents today with weakness, nausea/vomiting. States he had one episode of vomiting last night at 4 am. Has had these episodes of intermittent nausea and mucous vomiting prior to surgery. Denies any fevers, CP, SOB, dysuria, blood in stool or urine. Ileostomy output is recorded by his daughters, states about 200cc per day. Is not taking any antidiarrhetics.  Denies any abdominal pain. Has decreased appetite, but tolerating some soft foods. Does endorse some lightheadedness. Labs mostly unremarkable, concentrated on cbc, ua demonstrating +nitrites, given abx in ER. He presents today with his son in good spirits. His son is concerned about his weight loss and food intake.     PMH: History reviewed. No pertinent past medical history.    PSH:   Past Surgical History:   Procedure Laterality Date    COLECTOMY, TOTAL, ABDOMINAL with end ileostomy N/A 6/21/2019    Performed by Dino Rodríguez MD at [facility] OR [address] [place] [address]       Allergies: Review of patient's allergies indicates:  No Known Allergies  Meds:   Patient's Medications   New Prescriptions    No medications on file   Previous Medications    ERGOCALCIFEROL (ERGOCALCIFEROL) 50,000 UNIT CAP    Take 1 capsule (50,000 Units total) by mouth every 7 days.    OMEPRAZOLE (PRILOSEC) 20 MG CAPSULE    TAKE 1 CAPSULE BY MOUTH EVERY DAY AS NEEDED    OMEPRAZOLE 20 MG TBEC    Take 1 tablet by mouth daily as needed.    OXYCODONE-ACETAMINOPHEN (PERCOCET) 5-325 MG PER TABLET    Take 1 tablet by mouth every 4 (four) hours as needed for Pain.   Modified Medications    No medications on file   Discontinued Medications    No medications on file       Family History    Problem Relation Age of Onset    Diabetes Mother     No Known Problems Father     No Known Problems Sister     Cancer Brother        Review of Systems - For pertinent positives please see HPI   Constitutional: Negative for fever and chills.   HENT: Negative for congestion and ear pain.   Respiratory: Negative for cough and shortness of breath.   Cardiovascular: Negative for chest pain and palpitations.   Gastrointestinal: see HPI   Genitourinary: +minimal UOP. Negative for dysuria and hematuria.   Musculoskeletal: Negative for myalgias and arthralgias.   Skin: Negative for rash and wound.   Neurological: +near syncope.   Psychiatric/Behavioral: Negative for confusion and dysphoric mood    OBJECTIVE:     Vital Signs (Most Recent)  Temp: 98.5 °F (36.9 °C) (07/05/19 0950)  Pulse: 80 (07/05/19 1200)  Resp: 16 (07/05/19 1200)  BP: (!) 157/74 (07/05/19 1200)  SpO2: 100 % (07/05/19 1200)    Vital Signs Range (Last 24H):  Temp:  [98.5 °F (36.9 °C)]   Pulse:  [78-98]   Resp:  [16-21]   BP: (115-157)/(58-83)   SpO2:  [97 %-100 %]     I & O (Last 24H):    Intake/Output Summary (Last 24 hours) at 7/5/2019 1320  Last data filed at 7/5/2019 1220  Gross per 24 hour   Intake 2000 ml   Output --   Net 2000 ml       Physical Exam:  General: well developed, well nourished, no distress. Cathectic.   Head: NC / AT  EOMI / no scleral icterus   Neck: supple, no tracheal deviation  Lungs:  clear to auscultation bilaterally and normal respiratory effort  Heart: regular rate and rhythm, S1, S2 normal, no murmur, rub or gallop  Abdomen: soft, non-tender non-distented; bowel sounds normal; no masses,  no organomegaly   Midline wound with staples is clean and dry.  RLQ ileostomy with thin green succus. Bag in place with good seal. Stoma is pink and patent   Neuro: A&O x3, no focal deficits   Ext: wwp, no edema         Laboratory:  CBC:   Recent Labs   Lab 07/05/19  1039 07/05/19  1403   WBC 11.62  --    RBC 5.05  --    HGB 13.7*  --     HCT 41.4 33*   *  --    MCV 82  --    MCH 27.1  --    MCHC 33.1  --      BMP:   Recent Labs   Lab 07/05/19  1039   *   *   K 5.6*   CL 98   CO2 25   BUN 20   CREATININE 0.9   CALCIUM 11.3*     Labs within the past 24 hours have been reviewed.    Path: T4N1     ASSESSMENT/PLAN:     89 yo male with hx of perforated colon cancer s/p TAC and end ileostomy presents with dehydration and UTI    - near syncope/weakness most likely secondary to dehydration from poor PO intake and ileostomy output  - recommend strict recording of ileostomy   - loperamide 2mg BID to slow ileostomy output  - encouraged increased fluid intake and boost 2-3x per day to supplement calories  - given 2L in the ER for dehydration and ceftriaxone for UTI  - will plan for short term follow up with Dr. Rodríguez on Monday as patient has not had follow up appointment yet, staples to be d/melinda on Monday   - will need oncology referral to discuss adjuvant therapy     Donnie Degroot, PGY-5  General Surgery  [phone]

## 2019-07-05 NOTE — ED PROVIDER NOTES
Encounter Date: 7/5/2019       History     Chief Complaint   Patient presents with    Post-op Problem     abd surg, vomiting, feeling lightheaded has ileostomy bag     88-year-old male with history of recent large bowel obstruction POD #14 s/p ex-lap, total abdominal colectomy with end ileostomy (6/21- Dr. Rodríguez) presents for nausea, vomiting and lightheadedness.  Patient ate and peaches yesterday for the 1st time after advancing his diet from soft foods and subsequently had several episodes of emesis.  He also vomited after drinking water.  He reports associated lightheadedness and sensation of presyncope.  Son reports decreased urine output.  The symptoms have all since resolved.  He denies abdominal pain, chest pain, shortness of breath, back pain, fevers, dysuria, hematuria or decreased output from ileostomy.        Review of patient's allergies indicates:  No Known Allergies  History reviewed. No pertinent past medical history.  Past Surgical History:   Procedure Laterality Date    COLECTOMY, TOTAL, ABDOMINAL with end ileostomy N/A 6/21/2019    Performed by Dino Rodríguez MD at [facility] OR [address] [place] [address]     Family History   Problem Relation Age of Onset    Diabetes Mother     No Known Problems Father     No Known Problems Sister     Cancer Brother      Social History     Tobacco Use    Smoking status: Never Smoker    Smokeless tobacco: Never Used   Substance Use Topics    Alcohol use: Not Currently    Drug use: Not Currently     Review of Systems   Constitutional: Positive for unexpected weight change. Negative for chills, diaphoresis, fatigue and fever.   Respiratory: Negative for cough and shortness of breath.    Cardiovascular: Negative for chest pain and palpitations.   Gastrointestinal: Positive for nausea and vomiting. Negative for abdominal distention, abdominal pain, anal bleeding, blood in stool, constipation, diarrhea and rectal pain.   Endocrine: Negative for polydipsia and polyuria.   Genitourinary:  Positive for decreased urine volume. Negative for difficulty urinating, dysuria, flank pain, frequency, hematuria and urgency.   Musculoskeletal: Negative for back pain and myalgias.   Skin: Negative for color change and pallor.   Neurological: Positive for light-headedness. Negative for dizziness, syncope, weakness and headaches.   Hematological: Negative for adenopathy. Does not bruise/bleed easily.       Physical Exam     Initial Vitals [07/05/19 0950]   BP Pulse Resp Temp SpO2   (!) 141/82 98 18 98.5 °F (36.9 °C) 100 %      MAP       --         Physical Exam    Vitals reviewed.  Constitutional: He is not diaphoretic. He appears cachectic.  Non-toxic appearance. No distress.   Son at bedside   HENT:   Head: Normocephalic and atraumatic.   Mouth/Throat: Mucous membranes are dry.   Eyes: EOM are normal. Pupils are equal, round, and reactive to light. No scleral icterus.   Neck: Normal range of motion. Neck supple.   Cardiovascular: Normal rate, regular rhythm, normal heart sounds and intact distal pulses. Exam reveals no gallop and no friction rub.    No murmur heard.  Pulmonary/Chest: Breath sounds normal. No respiratory distress. He has no wheezes. He has no rhonchi. He has no rales. He exhibits no tenderness.   Abdominal: Soft. Bowel sounds are normal.   Large midline abdominal incision with staples in place.  No erythema, discharge or bleeding.  Ileostomy in place right upper quadrant.  Small amount of liquid brownish stool in bag.  No abdominal tenderness or guarding   Musculoskeletal: Normal range of motion.   Neurological: He is alert and oriented to person, place, and time.   Skin: Skin is warm and dry.   Psychiatric: He has a normal mood and affect.         ED Course   Procedures  Labs Reviewed   CBC W/ AUTO DIFFERENTIAL   COMPREHENSIVE METABOLIC PANEL   URINALYSIS, REFLEX TO URINE CULTURE   LIPASE          Imaging Results    None          Medical Decision Making:   History:   Old Medical Records: I  decided to obtain old medical records.  Clinical Tests:   Lab Tests: Ordered and Reviewed  Medical Tests: Ordered and Reviewed  Other:   I have discussed this case with another health care provider.       <> Summary of the Discussion: Colorectal surgery consulted due to postoperative nausea and vomiting. They recommend starting loperamide for increased output from ileostomy.  Patient will follow up in clinic with their service.       APC / Resident Notes:   88-year-old male presenting for nausea and several episodes of emesis s/p total abdominal colectomy 2 weeks ago.  On ED arrival, he is mildly hypertensive otherwise normal vitals.  He appears cachectic but nontoxic.  Differential diagnosis includes dehydration, electrolyte derangement, infection.  Low suspicion for bowel obstruction given good output from ostomy.  Will check labs, give fluids and consult Colorectal surgery.    Labs notable for nitrite positive UTI.  Will give ceftriaxone.  Chemistries notable for hyperkalemia with potassium of 5.6 despite normal renal function. Calcium is also elevated at 11.3.  Suspect this due to hemoconcentration from dehydration given that his hemoglobin, WBCs and platelets of also increased.  Will continue to hydrate and recheck electrolytes.    Patient seen and evaluated by Colorectal surgery.  They recommend loperamide for increased output from ileostomy.  Repeat i-STAT shows normalized potassium of 4.2 and normal ionized calcium of 1.24.  The patient is tolerating p.o..  Given clinical improvement and reassuring workup, I do not believe he requires further ED treatment is stable for discharge. Will discharge with Keflex and loperamide.  Patient will follow up in Colorectal surgery Clinic. Stressed the importance of follow-up, strict ED return precautions given.  Patient and son voice understanding and is comfortable with discharge. I discussed this patient with my supervising physician.    Mirta Andrew PA-C                        Clinical Impression:       ICD-10-CM ICD-9-CM   1. Nausea & vomiting R11.2 787.01   2. Post-operative nausea and vomiting R11.2 787.01    Z98.890    3. Acute cystitis without hematuria N30.00 595.0         Disposition:   Disposition: Discharged                        Mirta Andrew PA-C  07/05/19 7097

## 2019-07-05 NOTE — ED NOTES
Patient identifiers verified and correct for [facility]..    LOC: The patient is awake and alert; oriented x 3 and speaking quietly but appropriately.  APPEARANCE: Patient resting comfortably, patient is clean and well groomed.  SKIN: warm and dry, normal skin turgor & moist mucus membranes, skin intact, no breakdown noted. Ostomy and incision site clean, dry, and intact. No redness, swelling, or purulent drainage noted. Pt denies pain.  MUSCULOSKELETAL: Patient moving all extremities well, no obvious swelling or deformities noted.  RESPIRATORY: Airway is open and patent; respirations are spontaneous, normal effort and rate. Denies SOB.  CARDIAC: Patient has a normal rate, no peripheral edema noted, capillary refill < 3 seconds; No complaints of chest pain.   ABDOMEN: Soft and non tender to palpation, no distention noted. Pt reports n/v since last night.    Pt placed on cardiac monitor, continuous pulse ox, cycling blood pressures. Side rails up x2, call bell in reach, bed in low position with brake engaged. Will continue to monitor.

## 2019-07-08 ENCOUNTER — OFFICE VISIT (OUTPATIENT)
Dept: WOUND CARE | Facility: CLINIC | Age: 84
End: 2019-07-08
Payer: MEDICARE

## 2019-07-08 ENCOUNTER — OFFICE VISIT (OUTPATIENT)
Dept: SURGERY | Facility: CLINIC | Age: 84
End: 2019-07-08
Payer: MEDICARE

## 2019-07-08 VITALS
HEART RATE: 64 BPM | WEIGHT: 119.5 LBS | HEIGHT: 65 IN | BODY MASS INDEX: 19.91 KG/M2 | DIASTOLIC BLOOD PRESSURE: 59 MMHG | SYSTOLIC BLOOD PRESSURE: 115 MMHG

## 2019-07-08 DIAGNOSIS — Z93.2 ILEOSTOMY PRESENT: ICD-10-CM

## 2019-07-08 DIAGNOSIS — C18.7 MALIGNANT NEOPLASM OF SIGMOID COLON: Primary | ICD-10-CM

## 2019-07-08 DIAGNOSIS — Z71.89 ENCOUNTER FOR OSTOMY CARE EDUCATION: ICD-10-CM

## 2019-07-08 DIAGNOSIS — Z43.2 ATTENTION TO ILEOSTOMY: Primary | ICD-10-CM

## 2019-07-08 PROCEDURE — 99999 PR PBB SHADOW E&M-EST. PATIENT-LVL II: CPT | Mod: PBBFAC,,, | Performed by: CLINICAL NURSE SPECIALIST

## 2019-07-08 PROCEDURE — 99024 PR POST-OP FOLLOW-UP VISIT: ICD-10-PCS | Mod: S$GLB,,, | Performed by: CLINICAL NURSE SPECIALIST

## 2019-07-08 PROCEDURE — 99024 POSTOP FOLLOW-UP VISIT: CPT | Mod: S$GLB,,, | Performed by: CLINICAL NURSE SPECIALIST

## 2019-07-08 PROCEDURE — 99024 POSTOP FOLLOW-UP VISIT: CPT | Mod: S$GLB,,, | Performed by: COLON & RECTAL SURGERY

## 2019-07-08 PROCEDURE — 99024 PR POST-OP FOLLOW-UP VISIT: ICD-10-PCS | Mod: S$GLB,,, | Performed by: COLON & RECTAL SURGERY

## 2019-07-08 PROCEDURE — 99999 PR PBB SHADOW E&M-EST. PATIENT-LVL III: ICD-10-PCS | Mod: PBBFAC,,, | Performed by: COLON & RECTAL SURGERY

## 2019-07-08 PROCEDURE — 99999 PR PBB SHADOW E&M-EST. PATIENT-LVL III: CPT | Mod: PBBFAC,,, | Performed by: COLON & RECTAL SURGERY

## 2019-07-08 PROCEDURE — 99999 PR PBB SHADOW E&M-EST. PATIENT-LVL II: ICD-10-PCS | Mod: PBBFAC,,, | Performed by: CLINICAL NURSE SPECIALIST

## 2019-07-08 NOTE — PROGRESS NOTES
CRS Post-operative visit    Visit Info:     Procedure: Exploratory laparotomy with subtotal colectomy and end ileostomy.    Date of Procedure: June 21, 2019    Indication: 88-year-old male who presented to the Emergency Room with complaints of abdominal pain, distention and several   days of obstipation.  A CT scan demonstrated a mass in the distal sigmoid colon causing proximal obstruction.  There was significant colonic distention present with areas suspicious for pneumatosis in the region of the cecum.   At the time of surgery he was found to have a obstructing sigmoid mass with significant proximal dilation of the colon and areas of patchy ischemia and the cecum and ascending colon.  A subtotal colectomy was performed with an end ileostomy.    Date of Discharge: June 30, 2019    Current Status:  He was seen in the ER on 07/05/2019 with weakness, nausea, and vomiting.  Labs revealed hemoconcentration.  His family was not certain about the volume of his ostomy output.  He also had a positive urinalysis and was treated with antibiotics for UTI.  He was discharged home with follow-up with us.    He reports doing much better now.  His ostomy output has slowed and he is using Imodium from time to time as well.  He is eating with no nausea or vomiting and has no abdominal pain. No fevers or chills.    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  Margins negative (proximal, distal and mesenteric)  Nineteen benign lymph nodes (0/14)  Four tubular adenomas  Appendix - Mild hyperplastic changes  Tumor deposits (x 2)  SYNOPTIC REPORT  Procedure - Right hemicolectomy  Tumor Site - Right colon  Tumor Size - 4.0 x 3.8 x 0.1cm  Macroscopic Tumor Perforation - Not Identified  Histologic Type - Adenocarcinoma  Histologic Grade - Moderately differentiated  Microscopic Tumor Extension - Tumor invades through muscularis propia into subserosal adipose tissue  Margins - All negative (Proximal,  distal and mesenteric margins)  Treatment Effect - Not applicable  Lymphovascular Invasion - Not Identified  Perineural Invasion - Present  Tumor Deposits - Identified (two)  Pathologic Staging - p T3 N1c Mx  Number of Lymph Nodes Examined - 19  Number of Lymph Nodes Involved 0  IHC Interpretation  No loss of nuclear expression of MMR proteins: low probability of microsatellite instability      Physical Exam:  General: Unknown male in NAD   Neuro: aaox4   Respiratory: resps even unlabored  Abdomen:  Soft, nontender, nondistended, well-healed midline incision, viable and functioning RLQ end ileostomy  Extremities: Warm dry and intact    Assessment:  Obstructing sigmoid colon cancer ([id number]), s/p subtotal colectomy with end ileostomy    Plan:  Diet and activity as tolerated.  Imodium as needed to slow ostomy output.  He is not a candidate for adjuvant chemotherapy given his age.    Follow-up with urology for urinary tract infection, given his history of elevated PSA as well as a urethral stricture noted at the time of the surgery when we attempted to place a Jauregui catheter.  RTO 3 weeks.    Dino Rodríguez MD, FACS, FASCRS  Senior Staff Surgeon  Department of Colon & Rectal Surgery     This note was created using voice recognition software, and may contain some unrecognized transcriptional errors.

## 2019-07-08 NOTE — PROGRESS NOTES
This patient is unknown to me and is here in clinic today for first post op evaluation related to ileostomy. Surgery done 6/21 as emergency by Dr. Rodríguez. The patient reports no  problems with  new ostomy. The patient is receiving home health care with [facility].   Pain level today is reported as  0.    The ileostomy is 28 mm frankie high budded stoma.  The patient is currently  wearing a 1piece flat KALYN pouching system by  [employer].   Average wear time is 2 days.   Peristomal skin is clear    There is no  mucocutaneous separation.  Pt is coping well with the new ostomy.  SUPPLIES/DME: sent script to N  Patient enrolled in [employer] [facility] Program. Pt gives verbal permission and understanding a representative will call them regarding samples sent and follow up needs.  Samples requested today based on needs or patient requests today.    Pouching concerns include:    Crease of lower abdomen could be problem and slight mis placement due to age and vision   But so far no issues have occurred and pt is self care .    Gave his son ordering infor and numbers for precut KALYN 28 mm and 25 mm. As well as guide to measure   SPECIAL NEEDS:  Pt counseled on skin care, nutrition, hydration as well as  how to order ostomy supplies.  I spent greater than 50% of this 45 minute visit in face to face counseling.

## 2019-07-08 NOTE — LETTER
July 17, 2019      Mal Santoyo MD  [address]  [place] [address]           [facility]  [address]  [facility] [address]  Phone: [phone]          Patient: Cale Anthony Sr.   MR Number: [id number]   YOB: 1930   Date of Visit: 7/8/2019       Dear Dr Santoyo:    Thank you for referring Cale Anthony to me for evaluation. Attached you will find relevant portions of my assessment and plan of care.    If you have questions, please do not hesitate to call me. I look forward to following Cale Anthony along with you.    Sincerely,    Dino Rodríguez MD    Enclosure  CC:  No Recipients    If you would like to receive this communication electronically, please contact [email or web address] or [phone] to request more information on [facility] access.    For providers and/or their staff who would like to refer a patient to [facility], please contact us through our one-stop-shop provider referral line, [facility], at [phone].    If you feel you have received this communication in error or would no longer like to receive these types of communications, please e-mail [email or web address]

## 2019-07-09 ENCOUNTER — LAB VISIT (OUTPATIENT)
Dept: LAB | Facility: HOSPITAL | Age: 84
End: 2019-07-09
Payer: MEDICARE

## 2019-07-09 ENCOUNTER — OFFICE VISIT (OUTPATIENT)
Dept: UROLOGY | Facility: CLINIC | Age: 84
End: 2019-07-09
Payer: MEDICARE

## 2019-07-09 VITALS
DIASTOLIC BLOOD PRESSURE: 61 MMHG | HEIGHT: 65 IN | HEART RATE: 63 BPM | BODY MASS INDEX: 19.58 KG/M2 | WEIGHT: 117.5 LBS | SYSTOLIC BLOOD PRESSURE: 131 MMHG

## 2019-07-09 DIAGNOSIS — R82.71 BACTERIA IN URINE: ICD-10-CM

## 2019-07-09 DIAGNOSIS — N40.1 BPH WITH URINARY OBSTRUCTION: ICD-10-CM

## 2019-07-09 DIAGNOSIS — R97.20 ELEVATED PSA: ICD-10-CM

## 2019-07-09 DIAGNOSIS — N13.8 BPH WITH URINARY OBSTRUCTION: ICD-10-CM

## 2019-07-09 DIAGNOSIS — N35.819 OTHER URETHRAL STRICTURE, MALE, UNSPECIFIED SITE: Primary | ICD-10-CM

## 2019-07-09 DIAGNOSIS — R31.29 MICROSCOPIC HEMATURIA: ICD-10-CM

## 2019-07-09 LAB
BILIRUB SERPL-MCNC: NORMAL MG/DL
BLOOD URINE, POC: NORMAL
COLOR, POC UA: YELLOW
COMPLEXED PSA SERPL-MCNC: 9.9 NG/ML (ref 0–4)
GLUCOSE UR QL STRIP: NORMAL
KETONES UR QL STRIP: NORMAL
LEUKOCYTE ESTERASE URINE, POC: NORMAL
NITRITE, POC UA: NORMAL
PH, POC UA: 5
PROTEIN, POC: NORMAL
SPECIFIC GRAVITY, POC UA: 1
UROBILINOGEN, POC UA: NORMAL

## 2019-07-09 PROCEDURE — 81002 URINALYSIS NONAUTO W/O SCOPE: CPT | Mod: S$GLB,,, | Performed by: NURSE PRACTITIONER

## 2019-07-09 PROCEDURE — 81002 POCT URINE DIPSTICK WITHOUT MICROSCOPE: ICD-10-PCS | Mod: S$GLB,,, | Performed by: NURSE PRACTITIONER

## 2019-07-09 PROCEDURE — 99999 PR PBB SHADOW E&M-EST. PATIENT-LVL III: CPT | Mod: PBBFAC,,, | Performed by: NURSE PRACTITIONER

## 2019-07-09 PROCEDURE — 99214 OFFICE O/P EST MOD 30 MIN: CPT | Mod: 25,S$GLB,, | Performed by: NURSE PRACTITIONER

## 2019-07-09 PROCEDURE — 87086 URINE CULTURE/COLONY COUNT: CPT

## 2019-07-09 PROCEDURE — 1101F PR PT FALLS ASSESS DOC 0-1 FALLS W/OUT INJ PAST YR: ICD-10-PCS | Mod: CPTII,S$GLB,, | Performed by: NURSE PRACTITIONER

## 2019-07-09 PROCEDURE — 88112 CYTOPATH CELL ENHANCE TECH: CPT | Mod: 26,,, | Performed by: PATHOLOGY

## 2019-07-09 PROCEDURE — 88112 CYTOLOGY SPECIMEN-URINE: ICD-10-PCS | Mod: 26,,, | Performed by: PATHOLOGY

## 2019-07-09 PROCEDURE — 87088 URINE BACTERIA CULTURE: CPT

## 2019-07-09 PROCEDURE — 88112 CYTOPATH CELL ENHANCE TECH: CPT | Performed by: PATHOLOGY

## 2019-07-09 PROCEDURE — 84153 ASSAY OF PSA TOTAL: CPT

## 2019-07-09 PROCEDURE — 87106 FUNGI IDENTIFICATION YEAST: CPT

## 2019-07-09 PROCEDURE — 1101F PT FALLS ASSESS-DOCD LE1/YR: CPT | Mod: CPTII,S$GLB,, | Performed by: NURSE PRACTITIONER

## 2019-07-09 PROCEDURE — 99214 PR OFFICE/OUTPT VISIT, EST, LEVL IV, 30-39 MIN: ICD-10-PCS | Mod: 25,S$GLB,, | Performed by: NURSE PRACTITIONER

## 2019-07-09 PROCEDURE — 99999 PR PBB SHADOW E&M-EST. PATIENT-LVL III: ICD-10-PCS | Mod: PBBFAC,,, | Performed by: NURSE PRACTITIONER

## 2019-07-09 PROCEDURE — 36415 COLL VENOUS BLD VENIPUNCTURE: CPT

## 2019-07-09 RX ORDER — CIPROFLOXACIN 250 MG/1
500 TABLET, FILM COATED ORAL ONCE
Status: CANCELLED | OUTPATIENT
Start: 2019-07-09 | End: 2019-07-09

## 2019-07-09 RX ORDER — LIDOCAINE HYDROCHLORIDE 20 MG/ML
JELLY TOPICAL ONCE
Status: CANCELLED | OUTPATIENT
Start: 2019-07-09 | End: 2019-07-09

## 2019-07-09 NOTE — PROGRESS NOTES
Subjective:       Patient ID: Cale Anthony Sr. is a 88 y.o. male.    Chief Complaint: Urinary Tract Infection and Urethral Stricture    Cale Anthony Sr. is a 88 y.o. Male with a history of elevated psa and microscopic hematuria.  He was last seen in our Urology clinic 06/13/2019 as a new patient.   He came from with his son for evaluation of blood in his urine.    This was found by his PCP when he went in for abdominal pain/constipation.  His UA showed 9 RBC's  He has never seen blood.  FOS good for him  Nocturia 2-3x depending on fluid intake.  No family history of Prostate Cancer.    He was scheduled for recheck for us but 06/21/2019 went to ER on for abdominal pain.  CT showed colon obstruction; no masses/stones/obstruction in his kidneys/bladder.     06/21/2019 underwent Exploratory laparotomy with subtotal colectomy and end ileostomy with Dr. Leslie.    He came in today for evaluation; possible urethral stricture.           Dr. Teo Marlow Called to OR for difficulty placing Jauregui catheter.   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   12 Fr silicone catheter placed easily with return of clear yellow urine.   Jauregui catheter per primary team.                                    PSA                      5.7 (H)             05/24/2019                      History reviewed. No pertinent past medical history.    History reviewed. No pertinent surgical history.    Review of patient's family history indicates:  Problem: Diabetes      Relation: Mother          Age of Onset: (Not Specified)  Problem: No Known Problems      Relation: Father          Age of Onset: (Not Specified)  Problem: No Known Problems      Relation: Sister          Age of Onset: (Not Specified)  Problem: Cancer      Relation: Brother          Age of Onset: (Not Specified)      Social History    Socioeconomic History      Marital status: Single      Spouse name: Not on file      Number of  children: Not on file      Years of education: Not on file      Highest education level: Not on file    Occupational History      Not on file    Social Needs      Financial resource strain: Not on file      Food insecurity:        Worry: Not on file        Inability: Not on file      Transportation needs:        Medical: Not on file        Non-medical: Not on file    Tobacco Use      Smoking status: Never Smoker      Smokeless tobacco: Never Used    Substance and Sexual Activity      Alcohol use: Not Currently      Drug use: Not Currently      Sexual activity: Not on file    Lifestyle      Physical activity:        Days per week: Not on file        Minutes per session: Not on file      Stress: Not on file    Relationships      Social connections:        Talks on phone: Not on file        Gets together: Not on file        Attends Mandaeism service: Not on file        Active member of club or organization: Not on file        Attends meetings of clubs or organizations: Not on file        Relationship status: Not on file    Other Topics      Concerns:        Not on file    Social History Narrative      Not on file      Allergies:  Patient has no known allergies.    Medications:  Current Outpatient Medications:   docusate sodium (COLACE) 100 MG capsule, Take 1 capsule (100 mg total) by mouth 2 (two) times daily as needed for Constipation., Disp: 60 capsule, Rfl: 3  ergocalciferol (ERGOCALCIFEROL) 50,000 unit Cap, Take 1 capsule (50,000 Units total) by mouth every 7 days., Disp: 12 capsule, Rfl: 3  omeprazole 20 mg TbEC, Take 1 tablet by mouth daily as needed., Disp: 30 each, Rfl: 2        Review of Systems   Constitutional: Negative for activity change, appetite change, chills and fever.   HENT: Negative for facial swelling and trouble swallowing.    Eyes: Negative for visual disturbance.   Respiratory: Negative for chest tightness and shortness of breath.    Cardiovascular: Negative for chest pain and palpitations.    Gastrointestinal: Negative.  Negative for abdominal distention, abdominal pain, nausea and vomiting.        No issues with ileostomy.      Genitourinary: Negative for difficulty urinating, dysuria, flank pain, hematuria, penile pain, penile swelling, scrotal swelling and testicular pain.        He reports no changes with his urine     Musculoskeletal: Negative for back pain, gait problem, myalgias and neck stiffness.   Skin: Negative for rash.   Neurological: Negative for dizziness and speech difficulty.   Hematological: Does not bruise/bleed easily.   Psychiatric/Behavioral: Negative for behavioral problems.       Objective:      Physical Exam   Nursing note and vitals reviewed.  Constitutional: He is oriented to person, place, and time. He appears well-developed and well-nourished.  Non-toxic appearance. He does not have a sickly appearance.   Urine dipped clear of infection and no signs of blood in the office UA today.     HENT:   Head: Normocephalic and atraumatic.   Right Ear: External ear normal.   Left Ear: External ear normal.   Nose: Nose normal.   Mouth/Throat: Mucous membranes are normal.   Eyes: Conjunctivae and lids are normal. No scleral icterus.   Neck: Trachea normal, normal range of motion and full passive range of motion without pain. Neck supple. No JVD present. No tracheal deviation present.   Cardiovascular: Normal rate, S1 normal and S2 normal.    Pulmonary/Chest: Effort normal. No respiratory distress. He exhibits no tenderness.   Abdominal: Soft. Normal appearance and bowel sounds are normal. There is no hepatosplenomegaly. There is no tenderness. There is no CVA tenderness.   Genitourinary: Penis normal. No penile tenderness.   Musculoskeletal: Normal range of motion.   Neurological: He is alert and oriented to person, place, and time. He has normal strength.   Skin: Skin is warm, dry and intact.     Psychiatric: He has a normal mood and affect. His behavior is normal. Judgment and thought  content normal.       Assessment:       1. Other urethral stricture, male, unspecified site    2. Bacteria in urine    3. Microscopic hematuria    4. BPH with urinary obstruction    5. Elevated PSA        Plan:         I spent 30 minutes with the patient of which more than half was spent in direct consultation with the patient in regards to our treatment and plan.    Education and recommendations of today's plan of care including home remedies.  We discussed the past findings with us and now  He does have a new PSA processing today (this was ordered prior to LBO and obstruction).  Will get him set up for cysto with Dr. Kam.  Urine sent for cytology.   F/u based on results.

## 2019-07-11 LAB — BACTERIA UR CULT: ABNORMAL

## 2019-07-15 ENCOUNTER — EXTERNAL HOME HEALTH (OUTPATIENT)
Dept: HOME HEALTH SERVICES | Facility: HOSPITAL | Age: 84
End: 2019-07-15
Payer: MEDICARE

## 2019-07-29 ENCOUNTER — OFFICE VISIT (OUTPATIENT)
Dept: SURGERY | Facility: CLINIC | Age: 84
End: 2019-07-29
Payer: MEDICARE

## 2019-07-29 VITALS
HEART RATE: 59 BPM | BODY MASS INDEX: 20.35 KG/M2 | WEIGHT: 122.13 LBS | HEIGHT: 65 IN | DIASTOLIC BLOOD PRESSURE: 70 MMHG | SYSTOLIC BLOOD PRESSURE: 135 MMHG

## 2019-07-29 DIAGNOSIS — Z93.2 ILEOSTOMY PRESENT: ICD-10-CM

## 2019-07-29 DIAGNOSIS — C18.7 MALIGNANT NEOPLASM OF SIGMOID COLON: Primary | ICD-10-CM

## 2019-07-29 PROCEDURE — 99024 PR POST-OP FOLLOW-UP VISIT: ICD-10-PCS | Mod: S$GLB,,, | Performed by: COLON & RECTAL SURGERY

## 2019-07-29 PROCEDURE — 99024 POSTOP FOLLOW-UP VISIT: CPT | Mod: S$GLB,,, | Performed by: COLON & RECTAL SURGERY

## 2019-07-29 PROCEDURE — 99999 PR PBB SHADOW E&M-EST. PATIENT-LVL III: ICD-10-PCS | Mod: PBBFAC,,, | Performed by: COLON & RECTAL SURGERY

## 2019-07-29 PROCEDURE — 99999 PR PBB SHADOW E&M-EST. PATIENT-LVL III: CPT | Mod: PBBFAC,,, | Performed by: COLON & RECTAL SURGERY

## 2019-07-29 NOTE — LETTER
August 8, 2019      Mal Santoyo MD  [address]  [place] [address]           [facility]  [address]  [facility] [address]  Phone: [phone]          Patient: Cale Anthony Sr.   MR Number: [id number]   YOB: 1930   Date of Visit: 7/29/2019       Dear Dr. Santoyo:    Thank you for referring Cale Anthony to me for evaluation. Attached you will find relevant portions of my assessment and plan of care.    If you have questions, please do not hesitate to call me. I look forward to following Cale Anthony along with you.    Sincerely,    Dino Rodríguez MD    Enclosure  CC:  No Recipients    If you would like to receive this communication electronically, please contact [email or web address] or [phone] to request more information on [employer] access.    For providers and/or their staff who would like to refer a patient to [facility], please contact us through our one-stop-shop provider referral line, [facility], at [phone].    If you feel you have received this communication in error or would no longer like to receive these types of communications, please e-mail [email or web address]

## 2019-07-30 ENCOUNTER — TELEPHONE (OUTPATIENT)
Dept: WOUND CARE | Facility: CLINIC | Age: 84
End: 2019-07-30

## 2019-07-30 NOTE — TELEPHONE ENCOUNTER
----- Message from Tri Rene RN sent at 7/29/2019 10:14 AM CDT -----  Hello ma'am,    Can you give this patient's son a call about his ostomy bag order please?  He asked if you were here and I told him that you would be here tomorrow.    Thank you,   Tri Verma

## 2019-07-30 NOTE — TELEPHONE ENCOUNTER
I called son and got VM, I left message that on the day I saw mr Anthony 7/8/19 I wrote the script and faxed to [facility] as per routine, after that it is up to [facility] or the DME they assign to call patient and arrange for supplies .

## 2019-08-08 NOTE — PROGRESS NOTES
CRS Post-operative visit    Visit Info:     Procedure: Exploratory laparotomy with subtotal colectomy and end ileostomy.    Date of Procedure: June 21, 2019    Indication: 88-year-old male who presented to the Emergency Room with complaints of abdominal pain, distention and several   days of obstipation.  A CT scan demonstrated a mass in the distal sigmoid colon causing proximal obstruction.  There was significant colonic distention present with areas suspicious for pneumatosis in the region of the cecum.   At the time of surgery he was found to have a obstructing sigmoid mass with significant proximal dilation of the colon and areas of patchy ischemia and the cecum and ascending colon.  A subtotal colectomy was performed with an end ileostomy.    Date of Discharge: June 30, 2019    Current Status:  He was seen in the ER on 07/05/2019 with weakness, nausea, and vomiting.  Labs revealed hemoconcentration.  His family was not certain about the volume of his ostomy output.  He also had a positive urinalysis and was treated with antibiotics for UTI.  He was discharged home with follow-up with us.    He reports doing much better now.  His ostomy output has slowed - emptying appliance 2-3x/day, using Imodium intermittently.  He is eating with no nausea or vomiting and has no abdominal pain. No fevers or chills.    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  Margins negative (proximal, distal and mesenteric)  Nineteen benign lymph nodes (0/14)  Four tubular adenomas  Appendix - Mild hyperplastic changes  Tumor deposits (x 2)  SYNOPTIC REPORT  Procedure - Right hemicolectomy  Tumor Site - Right colon  Tumor Size - 4.0 x 3.8 x 0.1cm  Macroscopic Tumor Perforation - Not Identified  Histologic Type - Adenocarcinoma  Histologic Grade - Moderately differentiated  Microscopic Tumor Extension - Tumor invades through muscularis propia into subserosal adipose tissue  Margins - All negative  (Proximal, distal and mesenteric margins)  Treatment Effect - Not applicable  Lymphovascular Invasion - Not Identified  Perineural Invasion - Present  Tumor Deposits - Identified (two)  Pathologic Staging - p T3 N1c Mx  Number of Lymph Nodes Examined - 19  Number of Lymph Nodes Involved 0  IHC Interpretation  No loss of nuclear expression of MMR proteins: low probability of microsatellite instability      Physical Exam:  General: Unknown male in NAD   Neuro: aaox4   Respiratory: resps even unlabored  Abdomen:  Soft, nontender, nondistended, well-healed midline incision, viable and functioning RLQ end ileostomy  Extremities: Warm dry and intact    Assessment:  Obstructing sigmoid colon cancer ([id number]), s/p subtotal colectomy with end ileostomy    Plan:  Diet and activity as tolerated.  Imodium as needed to slow ostomy output.  He is not a candidate for adjuvant chemotherapy given his age.    RTO 4 months.    Dino Rodríguez MD, FACS, FASCRS  Senior Staff Surgeon  Department of Colon & Rectal Surgery     This note was created using voice recognition software, and may contain some unrecognized transcriptional errors.

## 2019-08-21 ENCOUNTER — HOSPITAL ENCOUNTER (INPATIENT)
Facility: HOSPITAL | Age: 84
LOS: 4 days | Discharge: HOME | DRG: 872 | End: 2019-08-25
Attending: EMERGENCY MEDICINE | Admitting: HOSPITALIST
Payer: MEDICARE

## 2019-08-21 DIAGNOSIS — R33.8 ACUTE URINARY RETENTION: ICD-10-CM

## 2019-08-21 DIAGNOSIS — R78.81 BACTEREMIA DUE TO ESCHERICHIA COLI: ICD-10-CM

## 2019-08-21 DIAGNOSIS — R65.20 SEVERE SEPSIS: ICD-10-CM

## 2019-08-21 DIAGNOSIS — A41.9 SEPSIS: ICD-10-CM

## 2019-08-21 DIAGNOSIS — R07.9 CHEST PAIN: ICD-10-CM

## 2019-08-21 DIAGNOSIS — A41.9 SEVERE SEPSIS: ICD-10-CM

## 2019-08-21 DIAGNOSIS — B96.20 BACTEREMIA DUE TO ESCHERICHIA COLI: ICD-10-CM

## 2019-08-21 DIAGNOSIS — R78.81 BACTEREMIA DUE TO GRAM-NEGATIVE BACTERIA: ICD-10-CM

## 2019-08-21 DIAGNOSIS — N39.0 URINARY TRACT INFECTION WITHOUT HEMATURIA, SITE UNSPECIFIED: Primary | ICD-10-CM

## 2019-08-21 DIAGNOSIS — C18.9 MALIGNANT NEOPLASM OF COLON, UNSPECIFIED PART OF COLON: ICD-10-CM

## 2019-08-21 LAB
ANION GAP SERPL CALC-SCNC: 11 MMOL/L (ref 8–16)
ANISOCYTOSIS BLD QL SMEAR: SLIGHT
BASOPHILS # BLD AUTO: 0.04 K/UL (ref 0–0.2)
BASOPHILS NFR BLD: 0.2 % (ref 0–1.9)
BILIRUB UR QL STRIP: NEGATIVE
BUN SERPL-MCNC: 14 MG/DL (ref 8–23)
BURR CELLS BLD QL SMEAR: ABNORMAL
CALCIUM SERPL-MCNC: 9.7 MG/DL (ref 8.7–10.5)
CHLORIDE SERPL-SCNC: 100 MMOL/L (ref 95–110)
CLARITY UR REFRACT.AUTO: ABNORMAL
CO2 SERPL-SCNC: 25 MMOL/L (ref 23–29)
COLOR UR AUTO: YELLOW
CREAT SERPL-MCNC: 1.4 MG/DL (ref 0.5–1.4)
DIFFERENTIAL METHOD: ABNORMAL
EOSINOPHIL # BLD AUTO: 0 K/UL (ref 0–0.5)
EOSINOPHIL NFR BLD: 0 % (ref 0–8)
ERYTHROCYTE [DISTWIDTH] IN BLOOD BY AUTOMATED COUNT: 15.5 % (ref 11.5–14.5)
EST. GFR  (AFRICAN AMERICAN): 51.1 ML/MIN/1.73 M^2
EST. GFR  (NON AFRICAN AMERICAN): 44.2 ML/MIN/1.73 M^2
GIANT PLATELETS BLD QL SMEAR: PRESENT
GLUCOSE SERPL-MCNC: 149 MG/DL (ref 70–110)
GLUCOSE UR QL STRIP: NEGATIVE
HCT VFR BLD AUTO: 38.9 % (ref 40–54)
HGB BLD-MCNC: 12.6 G/DL (ref 14–18)
HGB UR QL STRIP: ABNORMAL
IMM GRANULOCYTES # BLD AUTO: 0.22 K/UL (ref 0–0.04)
IMM GRANULOCYTES NFR BLD AUTO: 1.1 % (ref 0–0.5)
KETONES UR QL STRIP: NEGATIVE
LACTATE SERPL-SCNC: 1.3 MMOL/L (ref 0.5–2.2)
LACTATE SERPL-SCNC: 6.1 MMOL/L (ref 0.5–2.2)
LEUKOCYTE ESTERASE UR QL STRIP: ABNORMAL
LYMPHOCYTES # BLD AUTO: 1.3 K/UL (ref 1–4.8)
LYMPHOCYTES NFR BLD: 6.4 % (ref 18–48)
MCH RBC QN AUTO: 27.5 PG (ref 27–31)
MCHC RBC AUTO-ENTMCNC: 32.4 G/DL (ref 32–36)
MCV RBC AUTO: 85 FL (ref 82–98)
MICROSCOPIC COMMENT: ABNORMAL
MONOCYTES # BLD AUTO: 0.6 K/UL (ref 0.3–1)
MONOCYTES NFR BLD: 3 % (ref 4–15)
NEUTROPHILS # BLD AUTO: 17.5 K/UL (ref 1.8–7.7)
NEUTROPHILS NFR BLD: 89.3 % (ref 38–73)
NITRITE UR QL STRIP: NEGATIVE
NRBC BLD-RTO: 0 /100 WBC
OVALOCYTES BLD QL SMEAR: ABNORMAL
PH UR STRIP: 6 [PH] (ref 5–8)
PLATELET # BLD AUTO: 189 K/UL (ref 150–350)
PLATELET BLD QL SMEAR: ABNORMAL
PMV BLD AUTO: 10.9 FL (ref 9.2–12.9)
POIKILOCYTOSIS BLD QL SMEAR: SLIGHT
POLYCHROMASIA BLD QL SMEAR: ABNORMAL
POTASSIUM SERPL-SCNC: 4.4 MMOL/L (ref 3.5–5.1)
PROT UR QL STRIP: NEGATIVE
RBC # BLD AUTO: 4.59 M/UL (ref 4.6–6.2)
RBC #/AREA URNS AUTO: 33 /HPF (ref 0–4)
SODIUM SERPL-SCNC: 136 MMOL/L (ref 136–145)
SP GR UR STRIP: 1.01 (ref 1–1.03)
URN SPEC COLLECT METH UR: ABNORMAL
WBC # BLD AUTO: 19.57 K/UL (ref 3.9–12.7)
WBC #/AREA URNS AUTO: 71 /HPF (ref 0–5)

## 2019-08-21 PROCEDURE — 96361 HYDRATE IV INFUSION ADD-ON: CPT | Mod: 59

## 2019-08-21 PROCEDURE — 81001 URINALYSIS AUTO W/SCOPE: CPT

## 2019-08-21 PROCEDURE — 87088 URINE BACTERIA CULTURE: CPT

## 2019-08-21 PROCEDURE — 83605 ASSAY OF LACTIC ACID: CPT

## 2019-08-21 PROCEDURE — 87077 CULTURE AEROBIC IDENTIFY: CPT | Mod: 59

## 2019-08-21 PROCEDURE — 93005 ELECTROCARDIOGRAM TRACING: CPT

## 2019-08-21 PROCEDURE — 51702 INSERT TEMP BLADDER CATH: CPT

## 2019-08-21 PROCEDURE — 87186 SC STD MICRODIL/AGAR DIL: CPT | Mod: 59

## 2019-08-21 PROCEDURE — 99291 CRITICAL CARE FIRST HOUR: CPT | Mod: 25

## 2019-08-21 PROCEDURE — 99291 CRITICAL CARE FIRST HOUR: CPT | Mod: ,,, | Performed by: PHYSICIAN ASSISTANT

## 2019-08-21 PROCEDURE — 25000003 PHARM REV CODE 250: Performed by: PHYSICIAN ASSISTANT

## 2019-08-21 PROCEDURE — 87086 URINE CULTURE/COLONY COUNT: CPT

## 2019-08-21 PROCEDURE — 93010 EKG 12-LEAD: ICD-10-PCS | Mod: ,,, | Performed by: INTERNAL MEDICINE

## 2019-08-21 PROCEDURE — 96375 TX/PRO/DX INJ NEW DRUG ADDON: CPT | Mod: 59

## 2019-08-21 PROCEDURE — 85025 COMPLETE CBC W/AUTO DIFF WBC: CPT

## 2019-08-21 PROCEDURE — 80048 BASIC METABOLIC PNL TOTAL CA: CPT

## 2019-08-21 PROCEDURE — 63600175 PHARM REV CODE 636 W HCPCS: Performed by: PHYSICIAN ASSISTANT

## 2019-08-21 PROCEDURE — 96365 THER/PROPH/DIAG IV INF INIT: CPT | Mod: 59

## 2019-08-21 PROCEDURE — 12000002 HC ACUTE/MED SURGE SEMI-PRIVATE ROOM

## 2019-08-21 PROCEDURE — 93010 ELECTROCARDIOGRAM REPORT: CPT | Mod: ,,, | Performed by: INTERNAL MEDICINE

## 2019-08-21 PROCEDURE — 25500020 PHARM REV CODE 255: Performed by: EMERGENCY MEDICINE

## 2019-08-21 PROCEDURE — 63600175 PHARM REV CODE 636 W HCPCS: Performed by: EMERGENCY MEDICINE

## 2019-08-21 PROCEDURE — 99291 PR CRITICAL CARE, E/M 30-74 MINUTES: ICD-10-PCS | Mod: ,,, | Performed by: PHYSICIAN ASSISTANT

## 2019-08-21 PROCEDURE — 87040 BLOOD CULTURE FOR BACTERIA: CPT | Mod: 59

## 2019-08-21 RX ORDER — ACETAMINOPHEN 500 MG
1000 TABLET ORAL
Status: COMPLETED | OUTPATIENT
Start: 2019-08-21 | End: 2019-08-21

## 2019-08-21 RX ORDER — ONDANSETRON 2 MG/ML
4 INJECTION INTRAMUSCULAR; INTRAVENOUS
Status: COMPLETED | OUTPATIENT
Start: 2019-08-21 | End: 2019-08-21

## 2019-08-21 RX ORDER — VANCOMYCIN HCL IN 5 % DEXTROSE 1G/250ML
20 PLASTIC BAG, INJECTION (ML) INTRAVENOUS
Status: COMPLETED | OUTPATIENT
Start: 2019-08-22 | End: 2019-08-22

## 2019-08-21 RX ORDER — SODIUM CHLORIDE 9 MG/ML
1000 INJECTION, SOLUTION INTRAVENOUS
Status: COMPLETED | OUTPATIENT
Start: 2019-08-21 | End: 2019-08-22

## 2019-08-21 RX ORDER — ACETAMINOPHEN 325 MG/1
650 TABLET ORAL
Status: COMPLETED | OUTPATIENT
Start: 2019-08-21 | End: 2019-08-21

## 2019-08-21 RX ADMIN — SODIUM CHLORIDE 1000 ML: 0.9 INJECTION, SOLUTION INTRAVENOUS at 06:08

## 2019-08-21 RX ADMIN — ACETAMINOPHEN 1000 MG: 500 TABLET ORAL at 04:08

## 2019-08-21 RX ADMIN — ONDANSETRON 4 MG: 2 INJECTION INTRAMUSCULAR; INTRAVENOUS at 04:08

## 2019-08-21 RX ADMIN — CEFTRIAXONE 2 G: 2 INJECTION, SOLUTION INTRAVENOUS at 04:08

## 2019-08-21 RX ADMIN — SODIUM CHLORIDE 1632 ML: 0.9 INJECTION, SOLUTION INTRAVENOUS at 04:08

## 2019-08-21 RX ADMIN — IOHEXOL 75 ML: 350 INJECTION, SOLUTION INTRAVENOUS at 08:08

## 2019-08-21 RX ADMIN — ACETAMINOPHEN 650 MG: 325 TABLET ORAL at 05:08

## 2019-08-21 NOTE — ED PROVIDER NOTES
Encounter Date: 8/21/2019       History     Chief Complaint   Patient presents with    Male  Problem     no urine in 12 hrs, back hurts, last night chills,      89-year-old  male with history of colon cancer s/p colectomy with ileostomy 6/21/19 presents to the ED complaining of urinary retention.  He was able to urinate normally around 10:00 last evening, woke up this morning with suprapubic abdominal pain and lower back pain with an inability to urinate.  He reports chills. He denies fever, chest pain, shortness of breath, nausea, vomiting, diarrhea, abdominal pain, numbness, bowel incontinence.    The history is provided by the patient.     Review of patient's allergies indicates:  No Known Allergies  History reviewed. No pertinent past medical history.  Past Surgical History:   Procedure Laterality Date    COLECTOMY, TOTAL, ABDOMINAL with end ileostomy N/A 6/21/2019    Performed by Dino Rodríguez MD at [facility] OR [address] [place] [address]     Family History   Problem Relation Age of Onset    Diabetes Mother     No Known Problems Father     No Known Problems Sister     Cancer Brother      Social History     Tobacco Use    Smoking status: Never Smoker    Smokeless tobacco: Never Used   Substance Use Topics    Alcohol use: Not Currently    Drug use: Not Currently     Review of Systems   Constitutional: Positive for chills. Negative for fever.   HENT: Negative for congestion, rhinorrhea and sore throat.    Eyes: Negative for photophobia and visual disturbance.   Respiratory: Negative for shortness of breath.    Cardiovascular: Negative for chest pain.   Gastrointestinal: Positive for abdominal pain. Negative for constipation, diarrhea, nausea and vomiting.   Genitourinary: Positive for difficulty urinating. Negative for dysuria and hematuria.   Musculoskeletal: Positive for back pain.   Skin: Negative for rash and wound.   Neurological: Negative for light-headedness, numbness and headaches.    Psychiatric/Behavioral: Negative for confusion.       Physical Exam     Initial Vitals [08/21/19 1402]   BP Pulse Resp Temp SpO2   136/64 84 18 98.9 °F (37.2 °C) 100 %      MAP       --         Physical Exam    Nursing note and vitals reviewed.  Constitutional: He appears well-developed and well-nourished. He is not diaphoretic. No distress.   HENT:   Head: Normocephalic and atraumatic.   Neck: Normal range of motion. Neck supple.   Cardiovascular: Normal rate, regular rhythm and normal heart sounds. Exam reveals no gallop and no friction rub.    No murmur heard.  Pulmonary/Chest: Breath sounds normal. He has no wheezes. He has no rhonchi. He has no rales.   Abdominal: Soft. Bowel sounds are normal. There is tenderness (lower abdomen). There is no rebound and no guarding.   Ostomy noted to the R abdomen. Green stool noted. No cellulitis.    Musculoskeletal: Normal range of motion.   No midline L spine tenderness. Normal sensation, ROM, strength to bilateral LE.    Neurological: He is alert and oriented to person, place, and time.   Skin: Skin is warm and dry. No rash noted. No erythema.   Psychiatric: He has a normal mood and affect.         ED Course   Critical Care  Date/Time: 8/21/2019 3:15 PM  Performed by: Immanuel Ann DO  Authorized by: Immanuel Ann DO   Direct patient critical care time: 10 minutes  Additional history critical care time: 10 minutes  Ordering / reviewing critical care time: 10 minutes  Documentation critical care time: 5 minutes  Consulting other physicians critical care time: 10 minutes  Consult with family critical care time: 10 minutes  Total critical care time (exclusive of procedural time) : 55 minutes  Critical care was necessary to treat or prevent imminent or life-threatening deterioration of the following conditions: sepsis.  Critical care was time spent personally by me on the following activities: discussions with consultants, development of treatment plan with patient or  surrogate, evaluation of patient's response to treatment, examination of patient, obtaining history from patient or surrogate, ordering and performing treatments and interventions, ordering and review of laboratory studies, ordering and review of radiographic studies, pulse oximetry, re-evaluation of patient's condition and review of old charts.        Labs Reviewed   CBC W/ AUTO DIFFERENTIAL - Abnormal; Notable for the following components:       Result Value    WBC 19.57 (*)     RBC 4.59 (*)     Hemoglobin 12.6 (*)     Hematocrit 38.9 (*)     RDW 15.5 (*)     Immature Granulocytes 1.1 (*)     Gran # (ANC) 17.5 (*)     Immature Grans (Abs) 0.22 (*)     Gran% 89.3 (*)     Lymph% 6.4 (*)     Mono% 3.0 (*)     All other components within normal limits   BASIC METABOLIC PANEL - Abnormal; Notable for the following components:    Glucose 149 (*)     eGFR if  51.1 (*)     eGFR if non  44.2 (*)     All other components within normal limits   URINALYSIS, REFLEX TO URINE CULTURE - Abnormal; Notable for the following components:    Appearance, UA Hazy (*)     Occult Blood UA 3+ (*)     Leukocytes, UA 3+ (*)     All other components within normal limits    Narrative:     Preferred Collection Type->Urine, Catheterized   LACTIC ACID, PLASMA - Abnormal; Notable for the following components:    Lactate (Lactic Acid) 6.1 (*)     All other components within normal limits   URINALYSIS MICROSCOPIC - Abnormal; Notable for the following components:    RBC, UA 33 (*)     WBC, UA 71 (*)     All other components within normal limits    Narrative:     Preferred Collection Type->Urine, Catheterized   COMPREHENSIVE METABOLIC PANEL - Abnormal; Notable for the following components:    CO2 19 (*)     Albumin 2.8 (*)     Alkaline Phosphatase 35 (*)     ALT 7 (*)     All other components within normal limits   LACTIC ACID, PLASMA   MAGNESIUM   PHOSPHORUS     EKG Readings: (Independently Interpreted)   Initial  Reading: No STEMI. Previous EKG: Compared with most recent EKG Rhythm: Sinus Tachycardia. Heart Rate: 121. ST Segments: Non-Specific ST Segment Depression.   Sinus tachycardia with PVCs, nonspecific ST and T-wave abnormality, no STEMI     ECG Results          EKG 12-lead (In process)  Result time 08/22/19 07:53:26    In process by Interface, Lab In [facility] (08/22/19 07:53:26)                 Narrative:    Test Reason : A41.9,    Vent. Rate : 121 BPM     Atrial Rate : 121 BPM     P-R Int : 116 ms          QRS Dur : 064 ms      QT Int : 268 ms       P-R-T Axes : 084 080 227 degrees     QTc Int : 380 ms    Sinus tachycardia with frequent and consecutive Premature ventricular  complexes  ST and T wave abnormality, consider inferior ischemia  Abnormal ECG  When compared with ECG of 05-JUL-2019 10:34,  Premature ventricular complexes are now Present  ST now depressed in Inferior leads  ST now depressed in Anterior-lateral leads  T wave inversion now evident in Inferior leads  T wave inversion now evident in Lateral leads    Referred By: [email or web address]   SELF           Confirmed By:                             Imaging Results          CT Abdomen Pelvis With Contrast (Final result)  Result time 08/22/19 03:17:53   Procedure changed from CTA Abdomen and Pelvis     Final result by Ras Anderson MD (08/22/19 03:17:53)                 Impression:      No acute abnormality identified in the abdomen or pelvis.  No pneumatosis, portal venous gas, or portal venous gas to suggest bowel ischemia.    Interval colectomy with end ileostomy.    Stable 8 mm nodule at the left lung base.  Recommend continued attention on follow-up.    Indeterminate bilateral adrenal nodules, similar to prior.    Subcentimeter hypodense lesion in the pancreatic tail, possibly a side branch IPMN.  MRI could provide improved sensitivity if clinically warranted.    Prostatomegaly.  Moderate distension of the urinary bladder despite Jauregui catheter  placement.    The delay in dictation for this exam was related to a system wide outage of Fluency transcription software.      Electronically signed by: Ras Anderson MD  Date:    08/22/2019  Time:    03:17             Narrative:    EXAMINATION:  CT ABDOMEN PELVIS WITH CONTRAST    CLINICAL HISTORY:  Malignant neoplasm of colon;urinary retention, h/o colon cancer, elevated lactic acid;    TECHNIQUE:  Low dose axial images, sagittal and coronal reformations were obtained from the lung bases to the pubic symphysis following the IV administration of 75 mL of Omnipaque 350 .  Oral contrast was not given. Postcontrast images were also obtained in the delayed phase.    COMPARISON:  CT abdomen and pelvis, 06/21/2019.    FINDINGS:  Lower Chest:    Heart is not enlarged.  There is minimal pericardial fluid.  There is a stable 8 mm nodule in the left lower lobe (axial series 2, image 4).  No focal consolidation.  Linear dependent subsegmental atelectasis noted at the right lung base.  No pleural effusion.    Abdomen:    Liver is normal in size and contour.  No focal hepatic lesion.  Gallbladder is unremarkable. No intrahepatic biliary ductal dilatation.    Spleen is unremarkable.  There is a subcentimeter hypoattenuating lesion in the pancreatic tail (coronal image 27), similar to the prior study.  There is a 1.5 cm right adrenal nodule and 1.4 cm left adrenal nodule.  Additional nodular thickening at the inferior aspect of the left adrenal gland.  Findings are similar to slightly worse when compared with the prior study.    The kidneys are symmetric.  No hydronephrosis. Subcentimeter renal hypodensities are too small to definitively characterize, but likely represent cysts.    No evidence of small-bowel obstruction.  No pneumatosis or portal venous gas.  There has been interval colectomy with right lower quadrant end ileostomy.  Rectal stump is present.    No pneumoperitoneum or organized fluid collection.    No bulky  lymphadenopathy.    Abdominal aorta is normal in caliber with mild to moderate calcific atherosclerosis.  Celiac and superior mesenteric arteries are patent.    Portal, splenic, and superior mesenteric veins are patent.    Pelvis:    Urinary bladder is moderately distended and contains a Jauregui catheter.  There is a moderate volume of air within the bladder likely from Jauregui catheter placement.  Prostate is significantly enlarged, measuring approximately 6.3 cm in transverse width, similar to the prior study.  Rectal stump is present.  There is mild perirectal fat stranding but no significant pelvic free fluid.    Bones and soft tissues:    No aggressive osseous lesions.  There are advanced degenerative changes in the lower lumbar spine.                               X-Ray Chest AP Portable (Final result)  Result time 08/21/19 16:42:19    Final result by Sindy Boone MD (08/21/19 16:42:19)                 Impression:      As above.      Electronically signed by: Sindy Boone MD  Date:    08/21/2019  Time:    16:42             Narrative:    EXAMINATION:  XR CHEST AP PORTABLE    CLINICAL HISTORY:  Sepsis;    TECHNIQUE:  Single frontal view of the chest was performed.    COMPARISON:  Chest one view 06/25/2019    FINDINGS:  Interval removal of the right-sided PICC and NG tube.  There is a linear scar versus atelectasis of the right lung base.  The bilateral lungs are otherwise clear.    There is nonspecific prominence of the pulmonary vasculature.  There is thoracic aortic atherosclerosis.  The heart and mediastinum are otherwise unremarkable.    No pneumothorax.  The osseous structures are unremarkable.                              X-Rays:   Independently Interpreted Readings:   Chest X-Ray: Possible atelectasis the right lung base, no pneumothorax, no free air, no acute consolidation, normal     Medical Decision Making:   History:   Old Medical Records: I decided to obtain old medical records.  Old Records  Summarized: records from clinic visits and records from previous admission(s).  Independently Interpreted Test(s):   I have ordered and independently interpreted X-rays - see prior notes.  I have ordered and independently interpreted EKG Reading(s) - see prior notes  Clinical Tests:   Lab Tests: Ordered and Reviewed  Radiological Study: Ordered and Reviewed  Medical Tests: Ordered and Reviewed  Sepsis Perfusion Assessment: I attest, a sepsis perfusion exam was performed within 6 hours of Septic Shock presentation, following fluid resuscitation.  Other:   I have discussed this case with another health care provider.       <> Summary of the Discussion: Hospital medicine       APC / Resident Notes:   89-year-old  male with history of colon cancer s/p colectomy with ileostomy 6/21/19 presents to the ED complaining of urinary retention.  Vital signs stable. Minimal suprapubic tenderness to palpation noted.  There is no midline L-spine tenderness. Normal range of motion, strength, sensation to the bilateral lower extremities.  I do not suspect cauda equina.  Differential diagnosis includes but is not limited to UTI, sepsis, urinary retention, acute kidney injury, dehydration.  Will obtain labs, bladder scan, UA.    Bladder scan reveals 675mL urine. Jauregui catheter placed in the ED.     Leukocytosis noted with a WBC of 19.57.  Creatinine 1.4 (baseline 0.9).    While in the ED, patient became febrile, tachycardic.  Temp 102.6° F. 30 cc/kg IV fluid bolus ordered, 2 g Rocephin, 1 g Tylenol ordered. Blood cultures and lactic acid ordered.     Lactic acid 6.1. UA with signs of infection. Urine culture pending.    Due to elevated lactic acid, CTA abdomen/pelvis ordered. Will repeat lactic acid after IVF.     7:10 PM  Signed out to oncoming ED resident pending CTA and repeat lactic acid. Plan to admit.              Attending Attestation:     Physician Attestation Statement for NP/PA:   I have conducted a face to  face encounter with this patient in addition to the NP/PA, due to Medical Complexity          I have reviewed and concur with the advanced practice clinician's history, physical, assessment, and plan.  I have personally interviewed and examined the patient at bedside.  See below addendum for my evaluation and additional findings.    Briefly,  this is a 89 y.o. male status post colectomy with ileostomy for localized colon cancer presenting with acute urinary retention since last night.  Initial evaluation afebrile without tachycardia, without hypotension.  Patient had greater than 1 L seen on bedside ultrasound/bladder scan and had a immediate Jauregui catheter place which removed approximately 1.5 L of nonbloody urine output.  This relieved his abdominal discomfort from urinary tension however he soon developed a fever of 102, with labs showing leukocytosis of 20,000 and lactate of 6.  Sepsis protocol initiated with IV fluids normal saline bolus of 30 cc/kilogram.  Urine cultures, blood cultures obtained, empiric dose of vancomycin and Rocephin given in the ED.  CT of the abdomen obtained with no signs of perinephric abscess or intra-abdominal process.  Chest x-ray without any acute process on my read.  ECG with sinus tachycardia, but no STEMI on my read.  Placed on cardiac and telemetry monitoring with sepsis protocol initiated.  Discussed case with hospital medicine team, patient be admitted for ongoing management of sepsis with urine as a source likely.  Patient agreeable to admission plan.  Please see critical care note above for critical care time.    Complexity:  Critical      Immanuel Ann DO    Dept of Emergency Medicine   [facility]  Spectralink: [id number]             Clinical Impression:       ICD-10-CM ICD-9-CM   1. Urinary tract infection without hematuria, site unspecified N39.0 599.0   2. Sepsis A41.9 038.9     995.91   3. Chest pain R07.9 786.50         Disposition:    Disposition: Admitted  Condition: Serious                        Lawanda Foley PA-C  08/21/19 1910       Immanuel Ann DO  08/24/19 0641

## 2019-08-21 NOTE — ED NOTES
I attempted to place a 16 Pashto dallas and was not successful. I met resistance approximately 2 inches into the urethra. Pt states that he had surgery in the 1950s after damaging his urethra. Dallas removed. Dr. nAn notified.

## 2019-08-22 PROBLEM — R33.8 ACUTE URINARY RETENTION: Status: ACTIVE | Noted: 2019-08-22

## 2019-08-22 PROBLEM — C18.9 COLON CANCER: Status: ACTIVE | Noted: 2019-08-22

## 2019-08-22 PROBLEM — A41.9 SEVERE SEPSIS: Status: ACTIVE | Noted: 2019-08-22

## 2019-08-22 PROBLEM — R65.20 SEVERE SEPSIS: Status: ACTIVE | Noted: 2019-08-22

## 2019-08-22 LAB
ALBUMIN SERPL BCP-MCNC: 2.8 G/DL (ref 3.5–5.2)
ALP SERPL-CCNC: 35 U/L (ref 55–135)
ALT SERPL W/O P-5'-P-CCNC: 7 U/L (ref 10–44)
ANION GAP SERPL CALC-SCNC: 10 MMOL/L (ref 8–16)
AST SERPL-CCNC: 18 U/L (ref 10–40)
BASOPHILS # BLD AUTO: 0.02 K/UL (ref 0–0.2)
BASOPHILS NFR BLD: 0.1 % (ref 0–1.9)
BILIRUB SERPL-MCNC: 0.4 MG/DL (ref 0.1–1)
BUN SERPL-MCNC: 10 MG/DL (ref 8–23)
CALCIUM SERPL-MCNC: 8.7 MG/DL (ref 8.7–10.5)
CHLORIDE SERPL-SCNC: 108 MMOL/L (ref 95–110)
CO2 SERPL-SCNC: 19 MMOL/L (ref 23–29)
CREAT SERPL-MCNC: 0.7 MG/DL (ref 0.5–1.4)
DIFFERENTIAL METHOD: ABNORMAL
EOSINOPHIL # BLD AUTO: 0 K/UL (ref 0–0.5)
EOSINOPHIL NFR BLD: 0.2 % (ref 0–8)
ERYTHROCYTE [DISTWIDTH] IN BLOOD BY AUTOMATED COUNT: 15.9 % (ref 11.5–14.5)
EST. GFR  (AFRICAN AMERICAN): >60 ML/MIN/1.73 M^2
EST. GFR  (NON AFRICAN AMERICAN): >60 ML/MIN/1.73 M^2
GLUCOSE SERPL-MCNC: 110 MG/DL (ref 70–110)
HCT VFR BLD AUTO: 37.8 % (ref 40–54)
HGB BLD-MCNC: 11.5 G/DL (ref 14–18)
IMM GRANULOCYTES # BLD AUTO: 0.16 K/UL (ref 0–0.04)
IMM GRANULOCYTES NFR BLD AUTO: 1.2 % (ref 0–0.5)
LYMPHOCYTES # BLD AUTO: 1.3 K/UL (ref 1–4.8)
LYMPHOCYTES NFR BLD: 9.3 % (ref 18–48)
MAGNESIUM SERPL-MCNC: 1.8 MG/DL (ref 1.6–2.6)
MCH RBC QN AUTO: 27.3 PG (ref 27–31)
MCHC RBC AUTO-ENTMCNC: 30.4 G/DL (ref 32–36)
MCV RBC AUTO: 90 FL (ref 82–98)
MONOCYTES # BLD AUTO: 0.6 K/UL (ref 0.3–1)
MONOCYTES NFR BLD: 4.2 % (ref 4–15)
NEUTROPHILS # BLD AUTO: 11.5 K/UL (ref 1.8–7.7)
NEUTROPHILS NFR BLD: 85 % (ref 38–73)
NRBC BLD-RTO: 0 /100 WBC
PHOSPHATE SERPL-MCNC: 3.5 MG/DL (ref 2.7–4.5)
PLATELET # BLD AUTO: 149 K/UL (ref 150–350)
PLATELET BLD QL SMEAR: ABNORMAL
PMV BLD AUTO: 11.4 FL (ref 9.2–12.9)
POTASSIUM SERPL-SCNC: 4.1 MMOL/L (ref 3.5–5.1)
PROT SERPL-MCNC: 6.1 G/DL (ref 6–8.4)
RBC # BLD AUTO: 4.22 M/UL (ref 4.6–6.2)
SODIUM SERPL-SCNC: 137 MMOL/L (ref 136–145)
WBC # BLD AUTO: 13.48 K/UL (ref 3.9–12.7)

## 2019-08-22 PROCEDURE — 85025 COMPLETE CBC W/AUTO DIFF WBC: CPT

## 2019-08-22 PROCEDURE — 63600175 PHARM REV CODE 636 W HCPCS: Performed by: STUDENT IN AN ORGANIZED HEALTH CARE EDUCATION/TRAINING PROGRAM

## 2019-08-22 PROCEDURE — 25000003 PHARM REV CODE 250: Performed by: HOSPITALIST

## 2019-08-22 PROCEDURE — 80053 COMPREHEN METABOLIC PANEL: CPT

## 2019-08-22 PROCEDURE — 87040 BLOOD CULTURE FOR BACTERIA: CPT | Mod: 59

## 2019-08-22 PROCEDURE — 83735 ASSAY OF MAGNESIUM: CPT

## 2019-08-22 PROCEDURE — 63600175 PHARM REV CODE 636 W HCPCS: Performed by: HOSPITALIST

## 2019-08-22 PROCEDURE — 84100 ASSAY OF PHOSPHORUS: CPT

## 2019-08-22 PROCEDURE — 36415 COLL VENOUS BLD VENIPUNCTURE: CPT

## 2019-08-22 PROCEDURE — 11000001 HC ACUTE MED/SURG PRIVATE ROOM

## 2019-08-22 RX ORDER — CEFTRIAXONE 1 G/1
1 INJECTION, POWDER, FOR SOLUTION INTRAMUSCULAR; INTRAVENOUS
Status: DISCONTINUED | OUTPATIENT
Start: 2019-08-22 | End: 2019-08-22

## 2019-08-22 RX ORDER — ONDANSETRON 2 MG/ML
4 INJECTION INTRAMUSCULAR; INTRAVENOUS EVERY 8 HOURS PRN
Status: DISCONTINUED | OUTPATIENT
Start: 2019-08-22 | End: 2019-08-25 | Stop reason: HOSPADM

## 2019-08-22 RX ORDER — SODIUM CHLORIDE 0.9 % (FLUSH) 0.9 %
10 SYRINGE (ML) INJECTION
Status: DISCONTINUED | OUTPATIENT
Start: 2019-08-22 | End: 2019-08-25 | Stop reason: HOSPADM

## 2019-08-22 RX ORDER — ACETAMINOPHEN 325 MG/1
650 TABLET ORAL EVERY 6 HOURS PRN
Status: DISCONTINUED | OUTPATIENT
Start: 2019-08-22 | End: 2019-08-25 | Stop reason: HOSPADM

## 2019-08-22 RX ORDER — IBUPROFEN 200 MG
24 TABLET ORAL
Status: DISCONTINUED | OUTPATIENT
Start: 2019-08-22 | End: 2019-08-25 | Stop reason: HOSPADM

## 2019-08-22 RX ORDER — GLUCAGON 1 MG
1 KIT INJECTION
Status: DISCONTINUED | OUTPATIENT
Start: 2019-08-22 | End: 2019-08-25 | Stop reason: HOSPADM

## 2019-08-22 RX ORDER — TAMSULOSIN HYDROCHLORIDE 0.4 MG/1
0.4 CAPSULE ORAL DAILY
Status: DISCONTINUED | OUTPATIENT
Start: 2019-08-22 | End: 2019-08-25 | Stop reason: HOSPADM

## 2019-08-22 RX ORDER — ENOXAPARIN SODIUM 100 MG/ML
40 INJECTION SUBCUTANEOUS EVERY 24 HOURS
Status: DISCONTINUED | OUTPATIENT
Start: 2019-08-22 | End: 2019-08-25 | Stop reason: HOSPADM

## 2019-08-22 RX ORDER — IBUPROFEN 200 MG
16 TABLET ORAL
Status: DISCONTINUED | OUTPATIENT
Start: 2019-08-22 | End: 2019-08-25 | Stop reason: HOSPADM

## 2019-08-22 RX ORDER — SODIUM CHLORIDE 9 MG/ML
INJECTION, SOLUTION INTRAVENOUS ONCE
Status: COMPLETED | OUTPATIENT
Start: 2019-08-22 | End: 2019-08-22

## 2019-08-22 RX ADMIN — VANCOMYCIN HYDROCHLORIDE 1000 MG: 1 INJECTION, POWDER, LYOPHILIZED, FOR SOLUTION INTRAVENOUS at 01:08

## 2019-08-22 RX ADMIN — ENOXAPARIN SODIUM 40 MG: 100 INJECTION SUBCUTANEOUS at 05:08

## 2019-08-22 RX ADMIN — CEFTRIAXONE SODIUM 1 G: 1 INJECTION, POWDER, FOR SOLUTION INTRAMUSCULAR; INTRAVENOUS at 09:08

## 2019-08-22 RX ADMIN — ACETAMINOPHEN 650 MG: 325 TABLET ORAL at 11:08

## 2019-08-22 RX ADMIN — TAMSULOSIN HYDROCHLORIDE 0.4 MG: 0.4 CAPSULE ORAL at 09:08

## 2019-08-22 RX ADMIN — SODIUM CHLORIDE: 0.9 INJECTION, SOLUTION INTRAVENOUS at 04:08

## 2019-08-22 NOTE — H&P
[facility] Medicine  History & Physical    Patient Name: Cale Anthony Sr.  MRN: [id number]  Admission Date: 8/21/2019  Attending Physician: No att. providers found   Primary Care Provider: Mal Santoyo MD    [facility] Medicine Team: Networked reference to record PCT  Milena Orona DO     Patient information was obtained from patient and ER records.     Subjective:     Principal Problem:Urinary tract infection without hematuria    Chief Complaint:   Chief Complaint   Patient presents with    Male  Problem     no urine in 12 hrs, back hurts, last night chills,         HPI:     Patient is a 89 year old male with recent history  of localized colon cancer (adenocarcinoma with negative margin and lymph nodes)  s/p colectomy with ileostomy 06/21/19 presented to ED with complaint of urinary retention since last night. States he started feeling fever, chills, fatigue yesterday afternoon. He was not able to void urine since later that evening despite feeling urge and straining. Endorses lower abdominal distention with discomfort. Denies hematuria, N/V/D, headache, neck stiffness, rash, joint pain, chest pain or SOB. Denies history of BPH or kidney stone. He is otherwise healthy and only takes vitamin-D once a week.     He had ~1500 cc non bloody urine output after placing dallas in ED and relief of his abdominal discomfort. He is found to have severe sepsis with fever of 102F, WBC 20 and lactic acid 6 which improved to 1.3 after IVF NS 30 cc/kg. UTI is the likely source of his sepsis based on positive UA and no other obvious source. CT abdomen and CXR without acute process.  Blood and urine cultures sent. Received empiric dose of vancomycin and rocephin in ED.     He reports feeling better during my interview. He is conversant, AAO X 4 and not in distress.        No past medical history on file.    Past Surgical History:   Procedure Laterality Date    COLECTOMY, TOTAL, ABDOMINAL with end  ileostomy N/A 6/21/2019    Performed by Dino Rodríguez MD at [facility] OR [address] [place] [address]       Review of patient's allergies indicates:  No Known Allergies    No current facility-administered medications on file prior to encounter.      Current Outpatient Medications on File Prior to Encounter   Medication Sig    ergocalciferol (ERGOCALCIFEROL) 50,000 unit Cap Take 1 capsule (50,000 Units total) by mouth every 7 days.     Family History     Problem Relation (Age of Onset)    Cancer Brother    Diabetes Mother    No Known Problems Father, Sister        Tobacco Use    Smoking status: Never Smoker    Smokeless tobacco: Never Used   Substance and Sexual Activity    Alcohol use: Not Currently    Drug use: Not Currently    Sexual activity: Not on file     Review of Systems   Constitutional: Positive for chills, fatigue and fever. Negative for activity change, appetite change, diaphoresis and unexpected weight change.   HENT: Negative for congestion, dental problem, drooling, ear discharge, ear pain, facial swelling, hearing loss, mouth sores, nosebleeds, postnasal drip, rhinorrhea, sinus pressure, sneezing, sore throat, tinnitus, trouble swallowing and voice change.    Eyes: Negative for photophobia, pain, discharge, redness, itching and visual disturbance.   Respiratory: Negative for apnea, cough, choking, chest tightness, shortness of breath, wheezing and stridor.    Cardiovascular: Negative for chest pain, palpitations and leg swelling.   Gastrointestinal: Positive for abdominal distention (lower abdominal distention ). Negative for abdominal pain, anal bleeding, blood in stool, constipation, diarrhea, nausea, rectal pain and vomiting.   Endocrine: Negative for cold intolerance, heat intolerance, polydipsia, polyphagia and polyuria.   Genitourinary: Positive for difficulty urinating. Negative for decreased urine volume, discharge, dysuria, enuresis, flank pain, frequency, genital sores, hematuria, penile pain, penile  swelling, scrotal swelling, testicular pain and urgency.   Musculoskeletal: Negative for arthralgias, back pain, gait problem, joint swelling, myalgias, neck pain and neck stiffness.   Skin: Negative for color change, pallor, rash and wound.   Allergic/Immunologic: Negative for environmental allergies, food allergies and immunocompromised state.   Neurological: Negative for dizziness, tremors, seizures, syncope, facial asymmetry, speech difficulty, weakness, light-headedness, numbness and headaches.   Hematological: Negative for adenopathy. Does not bruise/bleed easily.   Psychiatric/Behavioral: Negative for agitation, behavioral problems, confusion, decreased concentration, dysphoric mood, hallucinations, self-injury, sleep disturbance and suicidal ideas. The patient is not nervous/anxious and is not hyperactive.      Objective:     Vital Signs (Most Recent):  Temp: 99.1 °F (37.3 °C) (08/21/19 1802)  Pulse: 90 (08/22/19 0341)  Resp: 20 (08/22/19 0341)  BP: 124/65 (08/22/19 0341)  SpO2: 100 % (08/22/19 0341) Vital Signs (24h Range):  Temp:  [98.9 °F (37.2 °C)-102.6 °F (39.2 °C)] 99.1 °F (37.3 °C)  Pulse:  [] 90  Resp:  [7-26] 20  SpO2:  [95 %-100 %] 100 %  BP: (103-200)/() 124/65     Weight: 54.4 kg (120 lb)  Body mass index is 19.97 kg/m².    Physical Exam   Constitutional: He is oriented to person, place, and time. He appears well-developed and well-nourished. No distress.   Thin male not in distress.    HENT:   Head: Normocephalic and atraumatic.   Nose: Nose normal.   Mouth/Throat: Oropharynx is clear and moist. No oropharyngeal exudate.   Eyes: Pupils are equal, round, and reactive to light. Conjunctivae and EOM are normal. No scleral icterus.   Neck: Normal range of motion. Neck supple. No JVD present. No tracheal deviation present. No thyromegaly present.   Cardiovascular: Normal rate, regular rhythm, normal heart sounds and intact distal pulses.   No murmur heard.  Pulmonary/Chest: Effort normal  and breath sounds normal. No respiratory distress. He has no wheezes. He has no rales. He exhibits no tenderness.   Abdominal: Soft. Bowel sounds are normal. He exhibits no distension and no mass. There is no tenderness. There is no rebound and no guarding.   Ileostomy bag in place over RLQ   Musculoskeletal: Normal range of motion. He exhibits no edema or tenderness.   Lymphadenopathy:     He has no cervical adenopathy.   Neurological: He is alert and oriented to person, place, and time. He has normal reflexes. He displays normal reflexes. No cranial nerve deficit. He exhibits normal muscle tone. Coordination normal.   Skin: Skin is warm and dry. No rash noted. He is not diaphoretic. No erythema.   Psychiatric: He has a normal mood and affect. Judgment and thought content normal.         CRANIAL NERVES     CN III, IV, VI   Pupils are equal, round, and reactive to light.  Extraocular motions are normal.       Significant Labs:   Recent Results (from the past 24 hour(s))   CBC auto differential    Collection Time: 08/21/19  2:53 PM   Result Value Ref Range    WBC 19.57 (H) 3.90 - 12.70 K/uL    RBC 4.59 (L) 4.60 - 6.20 M/uL    Hemoglobin 12.6 (L) 14.0 - 18.0 g/dL    Hematocrit 38.9 (L) 40.0 - 54.0 %    Mean Corpuscular Volume 85 82 - 98 fL    Mean Corpuscular Hemoglobin 27.5 27.0 - 31.0 pg    Mean Corpuscular Hemoglobin Conc 32.4 32.0 - 36.0 g/dL    RDW 15.5 (H) 11.5 - 14.5 %    Platelets 189 150 - 350 K/uL    MPV 10.9 9.2 - 12.9 fL    Immature Granulocytes 1.1 (H) 0.0 - 0.5 %    Gran # (ANC) 17.5 (H) 1.8 - 7.7 K/uL    Immature Grans (Abs) 0.22 (H) 0.00 - 0.04 K/uL    Lymph # 1.3 1.0 - 4.8 K/uL    Mono # 0.6 0.3 - 1.0 K/uL    Eos # 0.0 0.0 - 0.5 K/uL    Baso # 0.04 0.00 - 0.20 K/uL    nRBC 0 0 /100 WBC    Gran% 89.3 (H) 38.0 - 73.0 %    Lymph% 6.4 (L) 18.0 - 48.0 %    Mono% 3.0 (L) 4.0 - 15.0 %    Eosinophil% 0.0 0.0 - 8.0 %    Basophil% 0.2 0.0 - 1.9 %    Platelet Estimate Appears normal     Aniso Slight     Poik  Slight     Poly Occasional     Ovalocytes Occasional     Mariusz Cells Occasional     Large/Giant Platelets Present     Differential Method Automated    Basic metabolic panel    Collection Time: 08/21/19  2:53 PM   Result Value Ref Range    Sodium 136 136 - 145 mmol/L    Potassium 4.4 3.5 - 5.1 mmol/L    Chloride 100 95 - 110 mmol/L    CO2 25 23 - 29 mmol/L    Glucose 149 (H) 70 - 110 mg/dL    BUN, Bld 14 8 - 23 mg/dL    Creatinine 1.4 0.5 - 1.4 mg/dL    Calcium 9.7 8.7 - 10.5 mg/dL    Anion Gap 11 8 - 16 mmol/L    eGFR if African American 51.1 (A) >60 mL/min/1.73 m^2    eGFR if non  44.2 (A) >60 mL/min/1.73 m^2   Urinalysis, Reflex to Urine Culture Urine, Catheterized    Collection Time: 08/21/19  3:48 PM   Result Value Ref Range    Specimen UA Urine, Catheterized     Color, UA Yellow Yellow, Straw, Debra    Appearance, UA Hazy (A) Clear    pH, UA 6.0 5.0 - 8.0    Specific Gravity, UA 1.010 1.005 - 1.030    Protein, UA Negative Negative    Glucose, UA Negative Negative    Ketones, UA Negative Negative    Bilirubin (UA) Negative Negative    Occult Blood UA 3+ (A) Negative    Nitrite, UA Negative Negative    Leukocytes, UA 3+ (A) Negative   Urinalysis Microscopic    Collection Time: 08/21/19  3:48 PM   Result Value Ref Range    RBC, UA 33 (H) 0 - 4 /hpf    WBC, UA 71 (H) 0 - 5 /hpf    Microscopic Comment SEE COMMENT    Lactic acid, plasma    Collection Time: 08/21/19  4:00 PM   Result Value Ref Range    Lactate (Lactic Acid) 6.1 (HH) 0.5 - 2.2 mmol/L   Blood Culture #1 **CANNOT BE ORDERED STAT**    Collection Time: 08/21/19  4:00 PM   Result Value Ref Range    Blood Culture, Routine No Growth to date    Blood Culture #2 **CANNOT BE ORDERED STAT**    Collection Time: 08/21/19  4:15 PM   Result Value Ref Range    Blood Culture, Routine No Growth to date    Lactic acid, plasma    Collection Time: 08/21/19  7:06 PM   Result Value Ref Range    Lactate (Lactic Acid) 1.3 0.5 - 2.2 mmol/L         Significant  Imaging: I have reviewed and interpreted all pertinent imaging results/findings within the past 24 hours.    Assessment/Plan:     Active Diagnoses:    Diagnosis Date Noted POA    PRINCIPAL PROBLEM:  Urinary tract infection without hematuria [N39.0] 08/21/2019 Yes    Severe sepsis [A41.9, R65.20] 08/22/2019 Yes      Problems Resolved During this Admission:     #Severe sepsis    UTI, ?Prostatitis    Acute urinary retention     -dallas placed in ED with relief of abdominal discomfort and ~1500 cc non bloody urine output   -received IVF,  vancomycin and rocephin in ED  -continue rocephin for UTI, ?prostatitis   -follow up with cultures and tailor antibiotics accordingly   - will start on flomax 0.4 mg daily   -repeat WBC and renal function in am   -consult urology     #Colon cancer   -s/p subtotal colectomy with end ileostomy on 06/21/19 by Dr. Rodríguez   -pathology revealed adenocarcinoma with negative margin and lymph nodes   -He follows with Dr. Rodríguez and not a candidate for adjuvant chemotherapy given his age per recent clinic note (08/08/19)    VTE Risk Mitigation (From admission, onward)        Ordered     enoxaparin injection 40 mg  Daily      08/22/19 0307     IP VTE HIGH RISK PATIENT  Once      08/22/19 0307            Milena Orona DO  Department of Hospital Medicine   [facility]

## 2019-08-22 NOTE — PLAN OF CARE
08/22/19 1033   Discharge Assessment   Assessment Type Discharge Planning Assessment   Confirmed/corrected address and phone number on facesheet? Yes   Assessment information obtained from? Patient;Caregiver   Expected Length of Stay (days) 3   Communicated expected length of stay with patient/caregiver yes   Prior to hospitilization cognitive status: Alert/Oriented   Prior to hospitalization functional status: Assistive Equipment   Current cognitive status: Alert/Oriented   Current Functional Status: Assistive Equipment   Lives With spouse;child(sandra), adult   Able to Return to Prior Arrangements yes   Is patient able to care for self after discharge? Yes   Readmission Within the Last 30 Days no previous admission in last 30 days   Equipment Currently Used at Home walker, rolling   Do you have any problems affording any of your prescribed medications? No   Is the patient taking medications as prescribed? yes   Does the patient have transportation home? Yes   Transportation Anticipated family or friend will provide   Discharge Plan A Home   Discharge Plan B Home Health   DME Needed Upon Discharge  none   Patient/Family in Agreement with Plan yes

## 2019-08-22 NOTE — ED PROVIDER NOTES
[place] of Care note    This is a 89yo man with hx colon cancer s/p colectomy and ileostomy in June 2019 who presented for urinary retention. Patient care was handed off to me pending CT abd/pelvis, repeat lactate, and reassessment. I have reviewed his labs, WBC 19.5, lactate initially 6.1. UA with 3+ leuks and 71 WBC. He has received Rocephin and 30cc/kg IVF after developing fever and mild hypotension. CT abd/pelvis is pending. He is resting in bed and states he feels better than when he came in. Repeat lactate after IVF is 1.3. Will keep close monitoring of patient's BP given he is already fluid resuscitated, will require vasopressors for any further instability of BP.     Vitals:    08/21/19 1921   BP: (!) 109/58   Pulse: 102   Resp: 19   Temp:        Physical Exam   Constitutional: He is oriented to person, place, and time and well-developed, well-nourished, and in no distress.   HENT:   Head: Normocephalic and atraumatic.   Mouth/Throat: Oropharynx is clear and moist.   Eyes: Pupils are equal, round, and reactive to light. Conjunctivae and EOM are normal.   Neck: Normal range of motion. Neck supple.   Cardiovascular: Normal rate, regular rhythm, normal heart sounds and intact distal pulses.   Pulmonary/Chest: Effort normal and breath sounds normal. No respiratory distress. He has no wheezes. He has no rales.   Abdominal: Soft. He exhibits no distension. There is tenderness (mild around mid abdomen and ostomy bag). There is no guarding.   Ostomy bag at right abdomen   Musculoskeletal: Normal range of motion. He exhibits no edema.   Neurological: He is alert and oriented to person, place, and time.   Skin: Skin is warm and dry. He is not diaphoretic.   Nursing note and vitals reviewed.      Fara Byrne MD  7:47 PM  08/21/2019     Update: Fara Byrne HO-IV 11:28 PM  Informal read from radiology over the phone is that there are no acute findings on CT. Will admit to medicine for sepsis  secondary to likely UTI.            Fara Byrne MD  Resident  08/21/19 4843

## 2019-08-23 PROBLEM — R78.81 BACTEREMIA DUE TO GRAM-NEGATIVE BACTERIA: Status: ACTIVE | Noted: 2019-08-23

## 2019-08-23 LAB
ALBUMIN SERPL BCP-MCNC: 2.4 G/DL (ref 3.5–5.2)
ALP SERPL-CCNC: 49 U/L (ref 55–135)
ALT SERPL W/O P-5'-P-CCNC: 6 U/L (ref 10–44)
ANION GAP SERPL CALC-SCNC: 11 MMOL/L (ref 8–16)
AST SERPL-CCNC: 14 U/L (ref 10–40)
BASOPHILS # BLD AUTO: 0.03 K/UL (ref 0–0.2)
BASOPHILS NFR BLD: 0.2 % (ref 0–1.9)
BILIRUB SERPL-MCNC: 0.5 MG/DL (ref 0.1–1)
BUN SERPL-MCNC: 6 MG/DL (ref 8–23)
CALCIUM SERPL-MCNC: 9.1 MG/DL (ref 8.7–10.5)
CHLORIDE SERPL-SCNC: 106 MMOL/L (ref 95–110)
CO2 SERPL-SCNC: 22 MMOL/L (ref 23–29)
CREAT SERPL-MCNC: 0.6 MG/DL (ref 0.5–1.4)
DIFFERENTIAL METHOD: ABNORMAL
EOSINOPHIL # BLD AUTO: 0.1 K/UL (ref 0–0.5)
EOSINOPHIL NFR BLD: 0.4 % (ref 0–8)
ERYTHROCYTE [DISTWIDTH] IN BLOOD BY AUTOMATED COUNT: 15.9 % (ref 11.5–14.5)
EST. GFR  (AFRICAN AMERICAN): >60 ML/MIN/1.73 M^2
EST. GFR  (NON AFRICAN AMERICAN): >60 ML/MIN/1.73 M^2
FERRITIN SERPL-MCNC: 391 NG/ML (ref 20–300)
GLUCOSE SERPL-MCNC: 81 MG/DL (ref 70–110)
HCT VFR BLD AUTO: 30.6 % (ref 40–54)
HGB BLD-MCNC: 9.6 G/DL (ref 14–18)
IMM GRANULOCYTES # BLD AUTO: 0.08 K/UL (ref 0–0.04)
IMM GRANULOCYTES NFR BLD AUTO: 0.6 % (ref 0–0.5)
IRON SERPL-MCNC: 10 UG/DL (ref 45–160)
LYMPHOCYTES # BLD AUTO: 1.6 K/UL (ref 1–4.8)
LYMPHOCYTES NFR BLD: 11.1 % (ref 18–48)
MAGNESIUM SERPL-MCNC: 1.7 MG/DL (ref 1.6–2.6)
MCH RBC QN AUTO: 27.4 PG (ref 27–31)
MCHC RBC AUTO-ENTMCNC: 31.4 G/DL (ref 32–36)
MCV RBC AUTO: 87 FL (ref 82–98)
MONOCYTES # BLD AUTO: 0.9 K/UL (ref 0.3–1)
MONOCYTES NFR BLD: 6.3 % (ref 4–15)
NEUTROPHILS # BLD AUTO: 11.5 K/UL (ref 1.8–7.7)
NEUTROPHILS NFR BLD: 81.4 % (ref 38–73)
NRBC BLD-RTO: 0 /100 WBC
PHOSPHATE SERPL-MCNC: 3 MG/DL (ref 2.7–4.5)
PLATELET # BLD AUTO: 140 K/UL (ref 150–350)
PMV BLD AUTO: 12 FL (ref 9.2–12.9)
POTASSIUM SERPL-SCNC: 3.6 MMOL/L (ref 3.5–5.1)
PROT SERPL-MCNC: 5.6 G/DL (ref 6–8.4)
RBC # BLD AUTO: 3.5 M/UL (ref 4.6–6.2)
RETICS/RBC NFR AUTO: 0.7 % (ref 0.4–2)
SATURATED IRON: 5 % (ref 20–50)
SODIUM SERPL-SCNC: 139 MMOL/L (ref 136–145)
TOTAL IRON BINDING CAPACITY: 191 UG/DL (ref 250–450)
TRANSFERRIN SERPL-MCNC: 129 MG/DL (ref 200–375)
WBC # BLD AUTO: 14.06 K/UL (ref 3.9–12.7)

## 2019-08-23 PROCEDURE — 83735 ASSAY OF MAGNESIUM: CPT

## 2019-08-23 PROCEDURE — 25000003 PHARM REV CODE 250: Performed by: HOSPITALIST

## 2019-08-23 PROCEDURE — 82728 ASSAY OF FERRITIN: CPT

## 2019-08-23 PROCEDURE — 80053 COMPREHEN METABOLIC PANEL: CPT

## 2019-08-23 PROCEDURE — 84100 ASSAY OF PHOSPHORUS: CPT

## 2019-08-23 PROCEDURE — 85025 COMPLETE CBC W/AUTO DIFF WBC: CPT

## 2019-08-23 PROCEDURE — 63600175 PHARM REV CODE 636 W HCPCS: Performed by: HOSPITALIST

## 2019-08-23 PROCEDURE — 99233 SBSQ HOSP IP/OBS HIGH 50: CPT | Mod: ,,, | Performed by: HOSPITALIST

## 2019-08-23 PROCEDURE — 36415 COLL VENOUS BLD VENIPUNCTURE: CPT

## 2019-08-23 PROCEDURE — 99233 PR SUBSEQUENT HOSPITAL CARE,LEVL III: ICD-10-PCS | Mod: ,,, | Performed by: HOSPITALIST

## 2019-08-23 PROCEDURE — 83540 ASSAY OF IRON: CPT

## 2019-08-23 PROCEDURE — 11000001 HC ACUTE MED/SURG PRIVATE ROOM

## 2019-08-23 PROCEDURE — 85045 AUTOMATED RETICULOCYTE COUNT: CPT

## 2019-08-23 RX ADMIN — ENOXAPARIN SODIUM 40 MG: 100 INJECTION SUBCUTANEOUS at 05:08

## 2019-08-23 RX ADMIN — CEFTRIAXONE 2 G: 2 INJECTION, SOLUTION INTRAVENOUS at 10:08

## 2019-08-23 RX ADMIN — TAMSULOSIN HYDROCHLORIDE 0.4 MG: 0.4 CAPSULE ORAL at 10:08

## 2019-08-23 NOTE — ASSESSMENT & PLAN NOTE
- 2/2 urinary source  - repeat BCx on 8/22 NGTD  - continue on IV rocephin 2g daily, will wean pending sensitivities from cultures  - currently appears stable and afebrile with improving WBC

## 2019-08-23 NOTE — ASSESSMENT & PLAN NOTE
- initially presented with acute urinary retention, fevers, elevated WBC c/w sepsis  - UA 3+ leuks, 71 WBC  - UCx and admission BCx with GNR, pending s/s  - given vancomycin and rocephin in ED, will continue rocephin

## 2019-08-23 NOTE — ASSESSMENT & PLAN NOTE
- likely 2/2 UTI  - dallas in place  - continue flomax 0.4mg daily  - consider voiding trial prior to discharge; if fails will need to resume dallas and arrange Urology f/u

## 2019-08-23 NOTE — SUBJECTIVE & OBJECTIVE
Interval History: BCx positive overnight for GNR. Otherwise, patient is doing well. Denies any further pain or discomfort. No fever, chills.    Review of Systems   Constitutional: Negative for chills, diaphoresis and fever.   HENT: Negative for congestion and sore throat.    Eyes: Negative for discharge and visual disturbance.   Respiratory: Negative for cough and shortness of breath.    Cardiovascular: Negative for chest pain and leg swelling.   Gastrointestinal: Negative for abdominal pain, nausea and vomiting.   Genitourinary: Negative for dysuria and flank pain.   Musculoskeletal: Negative for arthralgias and joint swelling.   Skin: Negative for rash and wound.   Allergic/Immunologic: Negative for immunocompromised state.   Neurological: Negative for light-headedness and headaches.   Psychiatric/Behavioral: Negative for agitation and confusion.     Objective:     Vital Signs (Most Recent):  Temp: 97.9 °F (36.6 °C) (08/23/19 0745)  Pulse: 73 (08/23/19 0745)  Resp: 16 (08/23/19 0745)  BP: (!) 155/72 (08/23/19 0745)  SpO2: 97 % (08/23/19 0745) Vital Signs (24h Range):  Temp:  [97.9 °F (36.6 °C)-100.8 °F (38.2 °C)] 97.9 °F (36.6 °C)  Pulse:  [72-97] 73  Resp:  [14-16] 16  SpO2:  [94 %-100 %] 97 %  BP: (112-155)/(56-72) 155/72     Weight: 54.6 kg (120 lb 5.9 oz)  Body mass index is 20.03 kg/m².    Intake/Output Summary (Last 24 hours) at 8/23/2019 1102  Last data filed at 8/23/2019 0600  Gross per 24 hour   Intake --   Output 1250 ml   Net -1250 ml      Physical Exam   Constitutional: He is oriented to person, place, and time. He appears well-developed and well-nourished. No distress.   HENT:   Head: Normocephalic and atraumatic.   Nose: Nose normal.   Eyes: Pupils are equal, round, and reactive to light. EOM are normal. No scleral icterus.   Neck: Normal range of motion. No JVD present. No tracheal deviation present.   Cardiovascular: Normal rate, regular rhythm and normal heart sounds. Exam reveals no gallop and no  friction rub.   No murmur heard.  Pulmonary/Chest: Effort normal and breath sounds normal. No respiratory distress.   Abdominal: Soft. He exhibits no distension and no mass. There is no tenderness. No hernia.   Genitourinary:   Genitourinary Comments: Jauregui in place, clear urine draining   Musculoskeletal: He exhibits no edema or deformity.   Neurological: He is alert and oriented to person, place, and time.   Skin: Skin is warm and dry. No rash noted. He is not diaphoretic.   Psychiatric: He has a normal mood and affect. His behavior is normal.   Vitals reviewed.      Significant Labs:   Recent Lab Results       08/23/19  0351   08/22/19  1413   08/22/19  1126        Albumin 2.4         Alkaline Phosphatase 49         ALT 6         Anion Gap 11         AST 14         Baso # 0.03   0.02     Basophil% 0.2   0.1     BILIRUBIN TOTAL 0.5  Comment:  For infants and newborns, interpretation of results should be based  on gestational age, weight and in agreement with clinical  observations.  Premature Infant recommended reference ranges:  Up to 24 hours.............<8.0 mg/dL  Up to 48 hours............<12.0 mg/dL  3-5 days..................<15.0 mg/dL  6-29 days.................<15.0 mg/dL           Blood Culture, Routine   No Growth to date[P]          No Growth to date[P]       BUN, Bld 6         Calcium 9.1         Chloride 106         CO2 22         Creatinine 0.6         Differential Method Automated   Automated     eGFR if  >60.0         eGFR if non  >60.0  Comment:  Calculation used to obtain the estimated glomerular filtration  rate (eGFR) is the CKD-EPI equation.            Eos # 0.1   0.0     Eosinophil% 0.4   0.2     Ferritin 391         Glucose 81         Gran # (ANC) 11.5   11.5     Gran% 81.4   85.0     Hematocrit 30.6   37.8     Hemoglobin 9.6   11.5     Immature Grans (Abs) 0.08  Comment:  Mild elevation in immature granulocytes is non specific and   can be seen in a variety  of conditions including stress response,   acute inflammation, trauma and pregnancy. Correlation with other   laboratory and clinical findings is essential.     0.16  Comment:  Mild elevation in immature granulocytes is non specific and   can be seen in a variety of conditions including stress response,   acute inflammation, trauma and pregnancy. Correlation with other   laboratory and clinical findings is essential.       Immature Granulocytes 0.6   1.2     Iron 10         Lymph # 1.6   1.3     Lymph% 11.1   9.3     Magnesium 1.7         MCH 27.4   27.3     MCHC 31.4   30.4     MCV 87   90     Mono # 0.9   0.6     Mono% 6.3   4.2     MPV 12.0   11.4     nRBC 0   0     Phosphorus 3.0         Platelet Estimate     Appears normal     Platelets 140   149     Potassium 3.6         PROTEIN TOTAL 5.6         RBC 3.50   4.22     RDW 15.9   15.9     Retic 0.7         Saturated Iron 5         Sodium 139         TIBC 191         Transferrin 129         WBC 14.06   13.48         All pertinent labs within the past 24 hours have been reviewed.    Significant Imaging: I have reviewed all pertinent imaging results/findings within the past 24 hours.

## 2019-08-23 NOTE — PHYSICIAN QUERY
"PT Name: Cale Anthony Sr.  MR #: [id number]    Physician Query Form - Emergency Medicine Diagnosis Clarification     CDS: Guera Baum RN, CCDS         Contact information :[phone]  [email or web address]     This form is a permanent document in the medical record.     Query Date: August 22, 2019    By submitting this query, we are merely seeking further clarification of documentation.  Please utilize your independent clinical judgment when addressing the question(s) below.      The Medical record contains the following:     Diagnosis Supporting Clinical Information Location in Medical Record       "acute kidney injury"           BUN 14, creatinine 1.4, GFR 51.1  BUN 10, creatinine 0.7, GFR >60    "Severe sepsis"  "Acute urinary retention    -dallas placed in ED with relief of abdominal discomfort and ~1500 cc non bloody urine output   -received IVF,  vancomycin and rocephin in ED  -continue rocephin for UTI, ?prostatitis   -follow up with cultures and tailor antibiotics accordingly   - will start on flomax 0.4 mg daily   -repeat WBC and renal function in am   -consult urology"     ED Provider note 8/21/19      Lab 8/21/19  Lab 8/22/19    H&P 8/21/19         Do you agree with the Emergency Medicine diagnosis of acute kidney injury ?    [   ] Yes   [  x ] Yes, but it resolved prior to my assessment of the patient   [   ] No   [   ] Clinically Insignificant   [   ] Other/Clarification of Findings:   [  ] Clinically Undetermined         Please document in your progress notes daily for the duration of treatment until resolved and include in your discharge summary.                       "

## 2019-08-23 NOTE — HPI
Mr. Anthony is a 89 year old male with recent history  of localized colon cancer (adenocarcinoma with negative margin and lymph nodes)  s/p colectomy with ileostomy 06/21/19 presented to ED with complaint of urinary retention since last night. States he started feeling fever, chills, fatigue yesterday afternoon. He was not able to void urine since later that evening despite feeling urge and straining. Endorses lower abdominal distention with discomfort. Denies hematuria, N/V/D, headache, neck stiffness, rash, joint pain, chest pain or SOB. Denies history of BPH or kidney stone. He is otherwise healthy and only takes vitamin-D once a week.      He had ~1500 cc non bloody urine output after placing dallas in ED and relief of his abdominal discomfort. He is found to have severe sepsis with fever of 102F, WBC 20 and lactic acid 6 which improved to 1.3 after IVF NS 30 cc/kg. UTI is the likely source of his sepsis based on positive UA and no other obvious source. CT abdomen and CXR without acute process.  Blood and urine cultures sent. Received empiric dose of vancomycin and rocephin in ED.      He reports feeling better during my interview. He is conversant, AAO X 4 and not in distress.

## 2019-08-23 NOTE — PROGRESS NOTES
[facility] Hospital Medicine  Progress Note    Patient Name: Cale Anthony Sr.  MRN: [id number]  Patient Class: IP- Inpatient   Admission Date: 8/21/2019  Length of Stay: 2 days  Attending Physician: Lauri Perez, *  Primary Care Provider: Mal Santoyo MD    [facility] Hospital Medicine Team: [facility] [place] [facility]P MED R Lauri Perez MD    Subjective:     Principal Problem:Bacteremia due to Gram-negative bacteria        HPI:  Mr. Anthony is a 89 year old male with recent history  of localized colon cancer (adenocarcinoma with negative margin and lymph nodes)  s/p colectomy with ileostomy 06/21/19 presented to ED with complaint of urinary retention since last night. States he started feeling fever, chills, fatigue yesterday afternoon. He was not able to void urine since later that evening despite feeling urge and straining. Endorses lower abdominal distention with discomfort. Denies hematuria, N/V/D, headache, neck stiffness, rash, joint pain, chest pain or SOB. Denies history of BPH or kidney stone. He is otherwise healthy and only takes vitamin-D once a week.      He had ~1500 cc non bloody urine output after placing dallas in ED and relief of his abdominal discomfort. He is found to have severe sepsis with fever of 102F, WBC 20 and lactic acid 6 which improved to 1.3 after IVF NS 30 cc/kg. UTI is the likely source of his sepsis based on positive UA and no other obvious source. CT abdomen and CXR without acute process.  Blood and urine cultures sent. Received empiric dose of vancomycin and rocephin in ED.      He reports feeling better during my interview. He is conversant, AAO X 4 and not in distress.    Overview/Hospital Course:  No notes on file    Interval History: BCx positive overnight for GNR. Otherwise, patient is doing well. Denies any further pain or discomfort. No fever, chills.    Review of Systems   Constitutional: Negative for chills, diaphoresis and fever.   HENT: Negative for  congestion and sore throat.    Eyes: Negative for discharge and visual disturbance.   Respiratory: Negative for cough and shortness of breath.    Cardiovascular: Negative for chest pain and leg swelling.   Gastrointestinal: Negative for abdominal pain, nausea and vomiting.   Genitourinary: Negative for dysuria and flank pain.   Musculoskeletal: Negative for arthralgias and joint swelling.   Skin: Negative for rash and wound.   Allergic/Immunologic: Negative for immunocompromised state.   Neurological: Negative for light-headedness and headaches.   Psychiatric/Behavioral: Negative for agitation and confusion.     Objective:     Vital Signs (Most Recent):  Temp: 97.9 °F (36.6 °C) (08/23/19 0745)  Pulse: 73 (08/23/19 0745)  Resp: 16 (08/23/19 0745)  BP: (!) 155/72 (08/23/19 0745)  SpO2: 97 % (08/23/19 0745) Vital Signs (24h Range):  Temp:  [97.9 °F (36.6 °C)-100.8 °F (38.2 °C)] 97.9 °F (36.6 °C)  Pulse:  [72-97] 73  Resp:  [14-16] 16  SpO2:  [94 %-100 %] 97 %  BP: (112-155)/(56-72) 155/72     Weight: 54.6 kg (120 lb 5.9 oz)  Body mass index is 20.03 kg/m².    Intake/Output Summary (Last 24 hours) at 8/23/2019 1102  Last data filed at 8/23/2019 0600  Gross per 24 hour   Intake --   Output 1250 ml   Net -1250 ml      Physical Exam   Constitutional: He is oriented to person, place, and time. He appears well-developed and well-nourished. No distress.   HENT:   Head: Normocephalic and atraumatic.   Nose: Nose normal.   Eyes: Pupils are equal, round, and reactive to light. EOM are normal. No scleral icterus.   Neck: Normal range of motion. No JVD present. No tracheal deviation present.   Cardiovascular: Normal rate, regular rhythm and normal heart sounds. Exam reveals no gallop and no friction rub.   No murmur heard.  Pulmonary/Chest: Effort normal and breath sounds normal. No respiratory distress.   Abdominal: Soft. He exhibits no distension and no mass. There is no tenderness. No hernia.   Genitourinary:   Genitourinary  Comments: Jauregui in place, clear urine draining   Musculoskeletal: He exhibits no edema or deformity.   Neurological: He is alert and oriented to person, place, and time.   Skin: Skin is warm and dry. No rash noted. He is not diaphoretic.   Psychiatric: He has a normal mood and affect. His behavior is normal.   Vitals reviewed.      Significant Labs:   Recent Lab Results       08/23/19  0351   08/22/19  1413   08/22/19  1126        Albumin 2.4         Alkaline Phosphatase 49         ALT 6         Anion Gap 11         AST 14         Baso # 0.03   0.02     Basophil% 0.2   0.1     BILIRUBIN TOTAL 0.5  Comment:  For infants and newborns, interpretation of results should be based  on gestational age, weight and in agreement with clinical  observations.  Premature Infant recommended reference ranges:  Up to 24 hours.............<8.0 mg/dL  Up to 48 hours............<12.0 mg/dL  3-5 days..................<15.0 mg/dL  6-29 days.................<15.0 mg/dL           Blood Culture, Routine   No Growth to date[P]          No Growth to date[P]       BUN, Bld 6         Calcium 9.1         Chloride 106         CO2 22         Creatinine 0.6         Differential Method Automated   Automated     eGFR if  >60.0         eGFR if non  >60.0  Comment:  Calculation used to obtain the estimated glomerular filtration  rate (eGFR) is the CKD-EPI equation.            Eos # 0.1   0.0     Eosinophil% 0.4   0.2     Ferritin 391         Glucose 81         Gran # (ANC) 11.5   11.5     Gran% 81.4   85.0     Hematocrit 30.6   37.8     Hemoglobin 9.6   11.5     Immature Grans (Abs) 0.08  Comment:  Mild elevation in immature granulocytes is non specific and   can be seen in a variety of conditions including stress response,   acute inflammation, trauma and pregnancy. Correlation with other   laboratory and clinical findings is essential.     0.16  Comment:  Mild elevation in immature granulocytes is non specific and    can be seen in a variety of conditions including stress response,   acute inflammation, trauma and pregnancy. Correlation with other   laboratory and clinical findings is essential.       Immature Granulocytes 0.6   1.2     Iron 10         Lymph # 1.6   1.3     Lymph% 11.1   9.3     Magnesium 1.7         MCH 27.4   27.3     MCHC 31.4   30.4     MCV 87   90     Mono # 0.9   0.6     Mono% 6.3   4.2     MPV 12.0   11.4     nRBC 0   0     Phosphorus 3.0         Platelet Estimate     Appears normal     Platelets 140   149     Potassium 3.6         PROTEIN TOTAL 5.6         RBC 3.50   4.22     RDW 15.9   15.9     Retic 0.7         Saturated Iron 5         Sodium 139         TIBC 191         Transferrin 129         WBC 14.06   13.48         All pertinent labs within the past 24 hours have been reviewed.    Significant Imaging: I have reviewed all pertinent imaging results/findings within the past 24 hours.      Assessment/Plan:      * Bacteremia due to Gram-negative bacteria  - 2/2 urinary source  - repeat BCx on 8/22 NGTD  - continue on IV rocephin 2g daily, will wean pending sensitivities from cultures  - currently appears stable and afebrile with improving WBC      Colon cancer  -s/p subtotal colectomy with end ileostomy on 06/21/19 by Dr. Rodríguez   -pathology revealed adenocarcinoma with negative margin and lymph nodes   - follows with Dr. Rodríguez and not a candidate for adjuvant chemotherapy given his age per recent clinic note (08/08/19)    Acute urinary retention  - likely 2/2 UTI  - dallas in place  - continue flomax 0.4mg daily  - consider voiding trial prior to discharge; if fails will need to resume dallas and arrange Urology f/u      Severe sepsis  - 2/2 GNR bacteremia/UTI  - sepsis resolving      Urinary tract infection without hematuria  - initially presented with acute urinary retention, fevers, elevated WBC c/w sepsis  - UA 3+ leuks, 71 WBC  - UCx and admission BCx with GNR, pending s/s  - given vancomycin and  rocephin in ED, will continue rocephin      VTE Risk Mitigation (From admission, onward)        Ordered     enoxaparin injection 40 mg  Daily      08/22/19 0307     IP VTE HIGH RISK PATIENT  Once      08/22/19 0559     Place JOSHUA hose  Until discontinued      08/22/19 0559     Place sequential compression device  Until discontinued      08/22/19 0559                Lauri Perez MD  Department of Hospital Medicine   [facility]

## 2019-08-23 NOTE — PLAN OF CARE
Problem: Adult Inpatient Plan of Care  Goal: Plan of Care Review  Outcome: Ongoing (interventions implemented as appropriate)     08/23/19 6015   Plan of Care Review   Plan of Care Reviewed With patient   Progress improving   Pt A+Ox4, calm and cooperative throughout shift. Jauregui patent, draining CYU. Ileostomy in place, appliance CDI. Elevated temp overnight, given prn tylenol to good effect. Tolerating diet, denies n/v. Safety maintained throughout shift.

## 2019-08-23 NOTE — ASSESSMENT & PLAN NOTE
-s/p subtotal colectomy with end ileostomy on 06/21/19 by Dr. Rodríguez   -pathology revealed adenocarcinoma with negative margin and lymph nodes   - follows with Dr. Rodríguez and not a candidate for adjuvant chemotherapy given his age per recent clinic note (08/08/19)

## 2019-08-24 PROBLEM — R65.20 SEVERE SEPSIS: Status: RESOLVED | Noted: 2019-08-22 | Resolved: 2019-08-24

## 2019-08-24 PROBLEM — R91.1 LUNG NODULE: Status: ACTIVE | Noted: 2019-08-24

## 2019-08-24 PROBLEM — B96.20 BACTEREMIA DUE TO ESCHERICHIA COLI: Status: ACTIVE | Noted: 2019-08-23

## 2019-08-24 PROBLEM — A41.9 SEVERE SEPSIS: Status: RESOLVED | Noted: 2019-08-22 | Resolved: 2019-08-24

## 2019-08-24 LAB
BACTERIA BLD CULT: ABNORMAL
BACTERIA UR CULT: ABNORMAL

## 2019-08-24 PROCEDURE — 99222 PR INITIAL HOSPITAL CARE,LEVL II: ICD-10-PCS | Mod: ,,, | Performed by: UROLOGY

## 2019-08-24 PROCEDURE — 99239 HOSP IP/OBS DSCHRG MGMT >30: CPT | Mod: ,,, | Performed by: HOSPITALIST

## 2019-08-24 PROCEDURE — 99239 PR HOSPITAL DISCHARGE DAY,>30 MIN: ICD-10-PCS | Mod: ,,, | Performed by: HOSPITALIST

## 2019-08-24 PROCEDURE — 99222 1ST HOSP IP/OBS MODERATE 55: CPT | Mod: ,,, | Performed by: UROLOGY

## 2019-08-24 PROCEDURE — 11000001 HC ACUTE MED/SURG PRIVATE ROOM

## 2019-08-24 PROCEDURE — 63600175 PHARM REV CODE 636 W HCPCS: Performed by: HOSPITALIST

## 2019-08-24 PROCEDURE — 25000003 PHARM REV CODE 250: Performed by: HOSPITALIST

## 2019-08-24 RX ORDER — TAMSULOSIN HYDROCHLORIDE 0.4 MG/1
0.4 CAPSULE ORAL DAILY
Qty: 30 CAPSULE | Refills: 11 | Status: SHIPPED | OUTPATIENT
Start: 2019-08-25 | End: 2019-09-24 | Stop reason: SDUPTHER

## 2019-08-24 RX ORDER — CIPROFLOXACIN 750 MG/1
750 TABLET, FILM COATED ORAL EVERY 12 HOURS
Qty: 14 TABLET | Refills: 0 | Status: SHIPPED | OUTPATIENT
Start: 2019-08-24 | End: 2019-09-01

## 2019-08-24 RX ADMIN — CIPROFLOXACIN HYDROCHLORIDE 750 MG: 500 TABLET, FILM COATED ORAL at 09:08

## 2019-08-24 RX ADMIN — TAMSULOSIN HYDROCHLORIDE 0.4 MG: 0.4 CAPSULE ORAL at 08:08

## 2019-08-24 RX ADMIN — CEFTRIAXONE 2 G: 2 INJECTION, SOLUTION INTRAVENOUS at 08:08

## 2019-08-24 NOTE — PROGRESS NOTES
Notified Dr. Perez after placing dallas catheter, bloody urine and small clot drained into dallas bag. Stated he will place orders for Urology to come and see pt. Will continue to monitor pt.

## 2019-08-24 NOTE — ASSESSMENT & PLAN NOTE
- 2/2 urinary source  - repeat BCx on 8/22 NGTD  - initially on IV rocephin 2g daily, transitioned to cipro 750mg bid for total 10d course of antibiotics on discharge given uncomplicated bacteremia

## 2019-08-24 NOTE — DISCHARGE SUMMARY
[facility] Hospital Medicine  Discharge Summary      Patient Name: Cale Anthony Sr.  MRN: [id number]  Admission Date: 8/21/2019  Hospital Length of Stay: 3 days  Discharge Date and Time:  08/24/2019 4:04 PM  Attending Physician: Lauri Perez, *   Discharging Provider: Lauri Perez MD  Primary Care Provider: Mal Santoyo MD  [facility] Medicine Team: [facility] [place] [facility]OSP MED R Lauri Perez MD    HPI:   Mr. Anthony is a 89 year old male with recent history  of localized colon cancer (adenocarcinoma with negative margin and lymph nodes)  s/p colectomy with ileostomy 06/21/19 presented to ED with complaint of urinary retention since last night. States he started feeling fever, chills, fatigue yesterday afternoon. He was not able to void urine since later that evening despite feeling urge and straining. Endorses lower abdominal distention with discomfort. Denies hematuria, N/V/D, headache, neck stiffness, rash, joint pain, chest pain or SOB. Denies history of BPH or kidney stone. He is otherwise healthy and only takes vitamin-D once a week.      He had ~1500 cc non bloody urine output after placing dallas in ED and relief of his abdominal discomfort. He is found to have severe sepsis with fever of 102F, WBC 20 and lactic acid 6 which improved to 1.3 after IVF NS 30 cc/kg. UTI is the likely source of his sepsis based on positive UA and no other obvious source. CT abdomen and CXR without acute process.  Blood and urine cultures sent. Received empiric dose of vancomycin and rocephin in ED.      He reports feeling better during my interview. He is conversant, AAO X 4 and not in distress.    * No surgery found *      Hospital Course:   Mr. Anthony presented with sepsis, SANDER, and E. Coli bacteremia due to UTI with acute urinary retention. He was given IVF and started on rocephin upon admission. Dallas catheter was placed with bladder decompression. E. Coli sensitive to cipro and was  transitioned to PO cipro. Renal function improved. He failed voiding trial so dallas catheter replaced. He will f/u with Urology in 1 week. Also with 8mm lung nodule and ? IPMN on admission CT scan, should be followed up by PCP as outpatient. Problem based discussion below.    Vitals:    08/24/19 1226   BP: (!) 162/96   Pulse: 83   Resp: 18   Temp: 97.9 °F (36.6 °C)     Physical Exam   Constitutional: He is oriented to person, place, and time. He appears well-developed and well-nourished. No distress.   HENT:   Head: Normocephalic and atraumatic.   Nose: Nose normal.   Eyes: Pupils are equal, round, and reactive to light. EOM are normal. No scleral icterus.   Neck: Normal range of motion. No JVD present. No tracheal deviation present.   Cardiovascular: Normal rate, regular rhythm and normal heart sounds. Exam reveals no gallop and no friction rub.   No murmur heard.  Pulmonary/Chest: Effort normal and breath sounds normal. No respiratory distress.   Abdominal: Soft. He exhibits no distension and no mass. There is no tenderness. No hernia.   Genitourinary:   Genitourinary Comments: Dallas in place, clear urine draining   Musculoskeletal: He exhibits no edema or deformity.   Neurological: He is alert and oriented to person, place, and time.   Skin: Skin is warm and dry. No rash noted. He is not diaphoretic.   Psychiatric: He has a normal mood and affect. His behavior is normal.      Consults:     * Bacteremia due to Escherichia coli  - 2/2 urinary source  - repeat BCx on 8/22 NGTD  - initially on IV rocephin 2g daily, transitioned to cipro 750mg bid for total 10d course of antibiotics on discharge given uncomplicated bacteremia      Lung nodule  - seen on admission CT  - f/u with PCP      Colon cancer  -s/p subtotal colectomy with end ileostomy on 06/21/19 by Dr. Rodríguez   -pathology revealed adenocarcinoma with negative margin and lymph nodes   - follows with Dr. Rodríguez and not a candidate for adjuvant chemotherapy given  his age per recent clinic note (08/08/19)    Acute urinary retention  - 2/2 UTI  - dallas in place; failed voiding trial  - continue flomax 0.4mg daily  - continue antibiotics  - Urology f/u in 1 week      Urinary tract infection without hematuria  - initially presented with acute urinary retention, fevers, elevated WBC c/w sepsis  - UA 3+ leuks, 71 WBC  - UCx and admission BCx with E coli  - given vancomycin and rocephin in ED, now transitioning to cipro on discharge    Severe sepsis-resolved as of 8/24/2019  - 2/2 e coli bacteremia/UTI  - sepsis resolving        Final Active Diagnoses:    Diagnosis Date Noted POA    PRINCIPAL PROBLEM:  Bacteremia due to Escherichia coli [R78.81] 08/23/2019 Yes    Lung nodule [R91.1] 08/24/2019 Yes    Acute urinary retention [R33.8] 08/22/2019 Yes    Colon cancer [C18.9] 08/22/2019 Yes    Urinary tract infection without hematuria [N39.0] 08/21/2019 Yes      Problems Resolved During this Admission:    Diagnosis Date Noted Date Resolved POA    Severe sepsis [A41.9, R65.20] 08/22/2019 08/24/2019 Yes       Discharged Condition: good    Disposition: Home or Self Care    Follow Up:  Follow-up Information     [facility] In 1 week.    Specialty:  Urology  Contact information:  [address]  [facility] [address] [phone]               Patient Instructions:      Ambulatory Referral to Urology   Referral Priority: Routine Referral Type: Consultation   Referral Reason: Specialty Services Required   Requested Specialty: Urology   Number of Visits Requested: 1     Diet Adult Regular     Notify your health care provider if you experience any of the following:  temperature >100.4     Notify your health care provider if you experience any of the following:  severe uncontrolled pain     Notify your health care provider if you experience any of the following:  increased confusion or weakness     Activity as tolerated       Significant Diagnostic Studies: Labs:   CMP    Recent Labs   Lab 08/23/19  0351      K 3.6      CO2 22*   GLU 81   BUN 6*   CREATININE 0.6   CALCIUM 9.1   PROT 5.6*   ALBUMIN 2.4*   BILITOT 0.5   ALKPHOS 49*   AST 14   ALT 6*   ANIONGAP 11   ESTGFRAFRICA >60.0   EGFRNONAA >60.0   , CBC   Recent Labs   Lab 08/23/19  0351   WBC 14.06*   HGB 9.6*   HCT 30.6*   *   , INR No results found for: INR, PROTIME, Lipid Panel   Lab Results   Component Value Date    CHOL 184 05/24/2019    HDL 57 05/24/2019    LDLCALC 114.8 05/24/2019    TRIG 139 06/28/2019    CHOLHDL 31.0 05/24/2019   , Troponin No results for input(s): TROPONINI in the last 168 hours., A1C:   Recent Labs   Lab 05/24/19  0704   HGBA1C 5.5    and All labs within the past 24 hours have been reviewed  Microbiology:   Blood Culture   Lab Results   Component Value Date    LABBLOO No Growth to date 08/22/2019    LABBLOO No Growth to date 08/22/2019    LABBLOO No Growth to date 08/22/2019    LABBLOO No Growth to date 08/22/2019    and Urine Culture    Lab Results   Component Value Date    LABURIN ESCHERICHIA COLI  > 100,000 cfu/ml   (A) 08/21/2019     Radiology: CT scan: CT ABDOMEN PELVIS WITH CONTRAST:   Results for orders placed or performed during the hospital encounter of 08/21/19   CT Abdomen Pelvis With Contrast    Narrative    EXAMINATION:  CT ABDOMEN PELVIS WITH CONTRAST    CLINICAL HISTORY:  Malignant neoplasm of colon;urinary retention, h/o colon cancer, elevated lactic acid;    TECHNIQUE:  Low dose axial images, sagittal and coronal reformations were obtained from the lung bases to the pubic symphysis following the IV administration of 75 mL of Omnipaque 350 .  Oral contrast was not given. Postcontrast images were also obtained in the delayed phase.    COMPARISON:  CT abdomen and pelvis, 06/21/2019.    FINDINGS:  Lower Chest:    Heart is not enlarged.  There is minimal pericardial fluid.  There is a stable 8 mm nodule in the left lower lobe (axial series 2, image 4).  No focal  consolidation.  Linear dependent subsegmental atelectasis noted at the right lung base.  No pleural effusion.    Abdomen:    Liver is normal in size and contour.  No focal hepatic lesion.  Gallbladder is unremarkable. No intrahepatic biliary ductal dilatation.    Spleen is unremarkable.  There is a subcentimeter hypoattenuating lesion in the pancreatic tail (coronal image 27), similar to the prior study.  There is a 1.5 cm right adrenal nodule and 1.4 cm left adrenal nodule.  Additional nodular thickening at the inferior aspect of the left adrenal gland.  Findings are similar to slightly worse when compared with the prior study.    The kidneys are symmetric.  No hydronephrosis. Subcentimeter renal hypodensities are too small to definitively characterize, but likely represent cysts.    No evidence of small-bowel obstruction.  No pneumatosis or portal venous gas.  There has been interval colectomy with right lower quadrant end ileostomy.  Rectal stump is present.    No pneumoperitoneum or organized fluid collection.    No bulky lymphadenopathy.    Abdominal aorta is normal in caliber with mild to moderate calcific atherosclerosis.  Celiac and superior mesenteric arteries are patent.    Portal, splenic, and superior mesenteric veins are patent.    Pelvis:    Urinary bladder is moderately distended and contains a Jauregui catheter.  There is a moderate volume of air within the bladder likely from Jauregui catheter placement.  Prostate is significantly enlarged, measuring approximately 6.3 cm in transverse width, similar to the prior study.  Rectal stump is present.  There is mild perirectal fat stranding but no significant pelvic free fluid.    Bones and soft tissues:    No aggressive osseous lesions.  There are advanced degenerative changes in the lower lumbar spine.      Impression    No acute abnormality identified in the abdomen or pelvis.  No pneumatosis, portal venous gas, or portal venous gas to suggest bowel  ischemia.    Interval colectomy with end ileostomy.    Stable 8 mm nodule at the left lung base.  Recommend continued attention on follow-up.    Indeterminate bilateral adrenal nodules, similar to prior.    Subcentimeter hypodense lesion in the pancreatic tail, possibly a side branch IPMN.  MRI could provide improved sensitivity if clinically warranted.    Prostatomegaly.  Moderate distension of the urinary bladder despite Jauregui catheter placement.    The delay in dictation for this exam was related to a system wide outage of Fluency transcription software.      Electronically signed by: Ras Anderson MD  Date:    08/22/2019  Time:    03:17    and CT ABDOMEN PELVIS WITHOUT CONTRAST: No results found for this visit on 08/21/19.    Pending Diagnostic Studies:     None         Medications:  Reconciled Home Medications:      Medication List      START taking these medications    ciprofloxacin HCl 750 MG tablet  Commonly known as:  CIPRO  Take 1 tablet (750 mg total) by mouth every 12 (twelve) hours. for 7 days     tamsulosin 0.4 mg Cap  Commonly known as:  FLOMAX  Take 1 capsule (0.4 mg total) by mouth once daily.  Start taking on:  8/25/2019        CONTINUE taking these medications    ergocalciferol 50,000 unit Cap  Commonly known as:  ERGOCALCIFEROL  Take 1 capsule (50,000 Units total) by mouth every 7 days.            Indwelling Lines/Drains at time of discharge:   Lines/Drains/Airways     Drain                 Ileostomy 06/21/19 9392 other (see comments) 63 days                Time spent on the discharge of patient: 40 minutes  Patient was seen and examined on the date of discharge and determined to be suitable for discharge.         Lauri Perez MD  Department of Hospital Medicine  [facility]

## 2019-08-24 NOTE — ASSESSMENT & PLAN NOTE
- 2/2 UTI  - dallas in place; failed voiding trial  - continue flomax 0.4mg daily  - continue antibiotics  - Urology f/u in 1 week

## 2019-08-24 NOTE — HOSPITAL COURSE
Mr. Anthony presented with sepsis, SANDER, and E. Coli bacteremia due to UTI with acute urinary retention. He was given IVF and started on rocephin upon admission. Dallas catheter was placed with bladder decompression. E. Coli sensitive to cipro and was transitioned to PO cipro. Renal function improved. He failed voiding trial so dallas catheter replaced. He will f/u with Urology in 1 week. Also with 8mm lung nodule and ? IPMN on admission CT scan, should be followed up by PCP as outpatient. Problem based discussion below.    Vitals:    08/25/19 0723   BP: 125/64   Pulse: 79   Resp: 20   Temp: 99.6 °F (37.6 °C)     Physical Exam   Constitutional: He is oriented to person, place, and time. He appears well-developed and well-nourished. No distress.   HENT:   Head: Normocephalic and atraumatic.   Nose: Nose normal.   Eyes: Pupils are equal, round, and reactive to light. EOM are normal. No scleral icterus.   Neck: Normal range of motion. No JVD present. No tracheal deviation present.   Cardiovascular: Normal rate, regular rhythm and normal heart sounds. Exam reveals no gallop and no friction rub.   No murmur heard.  Pulmonary/Chest: Effort normal and breath sounds normal. No respiratory distress.   Abdominal: Soft. He exhibits no distension and no mass. There is no tenderness. No hernia.   Genitourinary:   Genitourinary Comments: Dallas in place, clear urine draining   Musculoskeletal: He exhibits no edema or deformity.   Neurological: He is alert and oriented to person, place, and time.   Skin: Skin is warm and dry. No rash noted. He is not diaphoretic.   Psychiatric: He has a normal mood and affect. His behavior is normal.

## 2019-08-24 NOTE — ASSESSMENT & PLAN NOTE
- initially presented with acute urinary retention, fevers, elevated WBC c/w sepsis  - UA 3+ leuks, 71 WBC  - UCx and admission BCx with E coli  - given vancomycin and rocephin in ED, now transitioning to cipro on discharge

## 2019-08-25 VITALS
DIASTOLIC BLOOD PRESSURE: 64 MMHG | WEIGHT: 120.38 LBS | SYSTOLIC BLOOD PRESSURE: 125 MMHG | OXYGEN SATURATION: 100 % | HEART RATE: 79 BPM | RESPIRATION RATE: 20 BRPM | TEMPERATURE: 100 F | BODY MASS INDEX: 20.06 KG/M2 | HEIGHT: 65 IN

## 2019-08-25 PROCEDURE — 25000003 PHARM REV CODE 250: Performed by: HOSPITALIST

## 2019-08-25 PROCEDURE — 99238 HOSP IP/OBS DSCHRG MGMT 30/<: CPT | Mod: ,,, | Performed by: HOSPITALIST

## 2019-08-25 PROCEDURE — 99238 PR HOSPITAL DISCHARGE DAY,<30 MIN: ICD-10-PCS | Mod: ,,, | Performed by: HOSPITALIST

## 2019-08-25 RX ADMIN — TAMSULOSIN HYDROCHLORIDE 0.4 MG: 0.4 CAPSULE ORAL at 08:08

## 2019-08-25 RX ADMIN — CIPROFLOXACIN HYDROCHLORIDE 750 MG: 500 TABLET, FILM COATED ORAL at 08:08

## 2019-08-25 NOTE — ASSESSMENT & PLAN NOTE
Continue home flomax  Follow up with Dr. Kam to discuss microscopic hematuria workup as well as urethral Jauregui trauma  Maintain Jauregui catheter for 7-10 days to allow bladder to decompress  In the future recommend using small silicone catheter if this patient needs a catheter    Please call with questions

## 2019-08-25 NOTE — ASSESSMENT & PLAN NOTE
- 2/2 UTI  - dallas in place; failed voiding trial - will leave in place (placed by Urology  - continue flomax 0.4mg daily  - continue antibiotics  - Urology f/u in 1 week

## 2019-08-25 NOTE — DISCHARGE SUMMARY
[facility] Hospital Medicine  Discharge Summary      Patient Name: Cale Anthony Sr.  MRN: [id number]  Admission Date: 8/21/2019  Hospital Length of Stay: 4 days  Discharge Date and Time:  08/25/2019 12:06 PM  Attending Physician: Lauri Perez, *   Discharging Provider: Lauri Perez MD  Primary Care Provider: Mal Santoyo MD  [facility] Medicine Team: [facility] MED R Luari Perez MD    HPI:   Mr. Anthony is a 89 year old male with recent history  of localized colon cancer (adenocarcinoma with negative margin and lymph nodes)  s/p colectomy with ileostomy 06/21/19 presented to ED with complaint of urinary retention since last night. States he started feeling fever, chills, fatigue yesterday afternoon. He was not able to void urine since later that evening despite feeling urge and straining. Endorses lower abdominal distention with discomfort. Denies hematuria, N/V/D, headache, neck stiffness, rash, joint pain, chest pain or SOB. Denies history of BPH or kidney stone. He is otherwise healthy and only takes vitamin-D once a week.      He had ~1500 cc non bloody urine output after placing dallas in ED and relief of his abdominal discomfort. He is found to have severe sepsis with fever of 102F, WBC 20 and lactic acid 6 which improved to 1.3 after IVF NS 30 cc/kg. UTI is the likely source of his sepsis based on positive UA and no other obvious source. CT abdomen and CXR without acute process.  Blood and urine cultures sent. Received empiric dose of vancomycin and rocephin in ED.      He reports feeling better during my interview. He is conversant, AAO X 4 and not in distress.    * No surgery found *      Hospital Course:   Mr. Anthony presented with sepsis, SANDER, and E. Coli bacteremia due to UTI with acute urinary retention. He was given IVF and started on rocephin upon admission. Dallas catheter was placed with bladder decompression. E. Coli sensitive to cipro and was  transitioned to PO cipro. Renal function improved. He failed voiding trial so dallas catheter replaced. He will f/u with Urology in 1 week. Also with 8mm lung nodule and ? IPMN on admission CT scan, should be followed up by PCP as outpatient. Problem based discussion below.    Vitals:    08/25/19 0723   BP: 125/64   Pulse: 79   Resp: 20   Temp: 99.6 °F (37.6 °C)     Physical Exam   Constitutional: He is oriented to person, place, and time. He appears well-developed and well-nourished. No distress.   HENT:   Head: Normocephalic and atraumatic.   Nose: Nose normal.   Eyes: Pupils are equal, round, and reactive to light. EOM are normal. No scleral icterus.   Neck: Normal range of motion. No JVD present. No tracheal deviation present.   Cardiovascular: Normal rate, regular rhythm and normal heart sounds. Exam reveals no gallop and no friction rub.   No murmur heard.  Pulmonary/Chest: Effort normal and breath sounds normal. No respiratory distress.   Abdominal: Soft. He exhibits no distension and no mass. There is no tenderness. No hernia.   Genitourinary:   Genitourinary Comments: Dallas in place, clear urine draining   Musculoskeletal: He exhibits no edema or deformity.   Neurological: He is alert and oriented to person, place, and time.   Skin: Skin is warm and dry. No rash noted. He is not diaphoretic.   Psychiatric: He has a normal mood and affect. His behavior is normal.       Consults:   Consults (From admission, onward)        Status Ordering Provider     Inpatient consult to Urology  Once     Provider:  (Not yet assigned)    FABIANO Mccrary          * Bacteremia due to Gram-negative bacteria  - 2/2 urinary source  - repeat BCx on 8/22 NGTD  - initially on IV rocephin 2g daily, transitioned to cipro 750mg bid for total 10d course of antibiotics on discharge given uncomplicated bacteremia      Lung nodule  - seen on admission CT  - f/u with PCP      Colon cancer  -s/p subtotal colectomy with end  ileostomy on 06/21/19 by Dr. Rodríguez   -pathology revealed adenocarcinoma with negative margin and lymph nodes   - follows with Dr. Rodríguez and not a candidate for adjuvant chemotherapy given his age per recent clinic note (08/08/19)    Acute urinary retention  - 2/2 UTI  - dallas in place; failed voiding trial - will leave in place (placed by Urology  - continue flomax 0.4mg daily  - continue antibiotics  - Urology f/u in 1 week      Urinary tract infection without hematuria  - initially presented with acute urinary retention, fevers, elevated WBC c/w sepsis  - UA 3+ leuks, 71 WBC  - UCx and admission BCx with E coli  - given vancomycin and rocephin in ED, now transitioning to cipro on discharge      Final Active Diagnoses:    Diagnosis Date Noted POA    PRINCIPAL PROBLEM:  Bacteremia due to Gram-negative bacteria [R78.81] 08/23/2019 Yes    Lung nodule [R91.1] 08/24/2019 Yes    Acute urinary retention [R33.8] 08/22/2019 Yes    Colon cancer [C18.9] 08/22/2019 Yes    Urinary tract infection without hematuria [N39.0] 08/21/2019 Yes      Problems Resolved During this Admission:    Diagnosis Date Noted Date Resolved POA    Severe sepsis [A41.9, R65.20] 08/22/2019 08/24/2019 Yes       Discharged Condition: good    Disposition: Home or Self Care    Follow Up:  Follow-up Information     [facility] In 1 week.    Specialty:  Urology  Contact information:  [address] [place] [id number]  [phone]               Patient Instructions:      Ambulatory Referral to Urology   Referral Priority: Routine Referral Type: Consultation   Referral Reason: Specialty Services Required   Requested Specialty: Urology   Number of Visits Requested: 1     Diet Adult Regular     Notify your health care provider if you experience any of the following:  temperature >100.4     Notify your health care provider if you experience any of the following:  severe uncontrolled pain     Notify your health care provider if you  experience any of the following:  increased confusion or weakness     Activity as tolerated       Significant Diagnostic Studies: Labs:   CMP No results for input(s): NA, K, CL, CO2, GLU, BUN, CREATININE, CALCIUM, PROT, ALBUMIN, BILITOT, ALKPHOS, AST, ALT, ANIONGAP, ESTGFRAFRICA, EGFRNONAA in the last 48 hours., CBC No results for input(s): WBC, HGB, HCT, PLT in the last 48 hours., INR No results found for: INR, PROTIME, Lipid Panel   Lab Results   Component Value Date    CHOL 184 05/24/2019    HDL 57 05/24/2019    LDLCALC 114.8 05/24/2019    TRIG 139 06/28/2019    CHOLHDL 31.0 05/24/2019   , Troponin No results for input(s): TROPONINI in the last 168 hours., A1C:   Recent Labs   Lab 05/24/19  0704   HGBA1C 5.5    and All labs within the past 24 hours have been reviewed  Microbiology:   Blood Culture   Lab Results   Component Value Date    LABBLOO No Growth to date 08/22/2019    LABBLOO No Growth to date 08/22/2019    LABBLOO No Growth to date 08/22/2019    LABBLOO No Growth to date 08/22/2019    LABBLOO No Growth to date 08/22/2019    LABBLOO No Growth to date 08/22/2019    and Urine Culture    Lab Results   Component Value Date    LABURIN ESCHERICHIA COLI  > 100,000 cfu/ml   (A) 08/21/2019     Radiology: CT scan: CT ABDOMEN PELVIS WITH CONTRAST:   Results for orders placed or performed during the hospital encounter of 08/21/19   CT Abdomen Pelvis With Contrast    Narrative    EXAMINATION:  CT ABDOMEN PELVIS WITH CONTRAST    CLINICAL HISTORY:  Malignant neoplasm of colon;urinary retention, h/o colon cancer, elevated lactic acid;    TECHNIQUE:  Low dose axial images, sagittal and coronal reformations were obtained from the lung bases to the pubic symphysis following the IV administration of 75 mL of Omnipaque 350 .  Oral contrast was not given. Postcontrast images were also obtained in the delayed phase.    COMPARISON:  CT abdomen and pelvis, 06/21/2019.    FINDINGS:  Lower Chest:    Heart is not enlarged.  There is  minimal pericardial fluid.  There is a stable 8 mm nodule in the left lower lobe (axial series 2, image 4).  No focal consolidation.  Linear dependent subsegmental atelectasis noted at the right lung base.  No pleural effusion.    Abdomen:    Liver is normal in size and contour.  No focal hepatic lesion.  Gallbladder is unremarkable. No intrahepatic biliary ductal dilatation.    Spleen is unremarkable.  There is a subcentimeter hypoattenuating lesion in the pancreatic tail (coronal image 27), similar to the prior study.  There is a 1.5 cm right adrenal nodule and 1.4 cm left adrenal nodule.  Additional nodular thickening at the inferior aspect of the left adrenal gland.  Findings are similar to slightly worse when compared with the prior study.    The kidneys are symmetric.  No hydronephrosis. Subcentimeter renal hypodensities are too small to definitively characterize, but likely represent cysts.    No evidence of small-bowel obstruction.  No pneumatosis or portal venous gas.  There has been interval colectomy with right lower quadrant end ileostomy.  Rectal stump is present.    No pneumoperitoneum or organized fluid collection.    No bulky lymphadenopathy.    Abdominal aorta is normal in caliber with mild to moderate calcific atherosclerosis.  Celiac and superior mesenteric arteries are patent.    Portal, splenic, and superior mesenteric veins are patent.    Pelvis:    Urinary bladder is moderately distended and contains a Jauregui catheter.  There is a moderate volume of air within the bladder likely from Jauregui catheter placement.  Prostate is significantly enlarged, measuring approximately 6.3 cm in transverse width, similar to the prior study.  Rectal stump is present.  There is mild perirectal fat stranding but no significant pelvic free fluid.    Bones and soft tissues:    No aggressive osseous lesions.  There are advanced degenerative changes in the lower lumbar spine.      Impression    No acute abnormality  identified in the abdomen or pelvis.  No pneumatosis, portal venous gas, or portal venous gas to suggest bowel ischemia.    Interval colectomy with end ileostomy.    Stable 8 mm nodule at the left lung base.  Recommend continued attention on follow-up.    Indeterminate bilateral adrenal nodules, similar to prior.    Subcentimeter hypodense lesion in the pancreatic tail, possibly a side branch IPMN.  MRI could provide improved sensitivity if clinically warranted.    Prostatomegaly.  Moderate distension of the urinary bladder despite Jauregui catheter placement.    The delay in dictation for this exam was related to a system wide outage of Fluency transcription software.      Electronically signed by: Ras Anderson MD  Date:    08/22/2019  Time:    03:17       Pending Diagnostic Studies:     None         Medications:  Reconciled Home Medications:      Medication List      START taking these medications    ciprofloxacin HCl 750 MG tablet  Commonly known as:  CIPRO  Take 1 tablet (750 mg total) by mouth every 12 (twelve) hours. for 7 days     tamsulosin 0.4 mg Cap  Commonly known as:  FLOMAX  Take 1 capsule (0.4 mg total) by mouth once daily.        CONTINUE taking these medications    ergocalciferol 50,000 unit Cap  Commonly known as:  ERGOCALCIFEROL  Take 1 capsule (50,000 Units total) by mouth every 7 days.            Indwelling Lines/Drains at time of discharge:   Lines/Drains/Airways     Drain                 Ileostomy 06/21/19 6690 other (see comments) 64 days         Urethral Catheter 08/24/19 1 day                Time spent on the discharge of patient: 25 minutes  Patient was seen and examined on the date of discharge and determined to be suitable for discharge.         Lauri Perez MD  Department of Hospital Medicine  [facility]

## 2019-08-25 NOTE — CONSULTS
[facility]  Urology  Consult Note    Patient Name: Cale Anthony Sr.  MRN: [id number]  Admission Date: 8/21/2019  Hospital Length of Stay: 3   Code Status: Full Code   Attending Provider: Lauri Perez, *   Consulting Provider: Teo Marlow MD  Primary Care Physician: Mal Santoyo MD  Principal Problem:Bacteremia due to Gram-negative bacteria    Inpatient consult to Urology  Consult performed by: Teo Marlow MD  Consult ordered by: Lauri Perez MD          Subjective:     HPI:  Mr. Anthony is a 89 year old male with recent history  of localized colon cancer (adenocarcinoma with negative margin and lymph nodes)  s/p colectomy with ileostomy 06/21/19 who presented to ED with complaint of urinary retention on 8/21. He was febrile to 102F, WBC 20 and lactic acid 6 and had a + urine that grew out e. Coli.    A dallas catheter was placed at that time with return of 1500ml of urine. He improved with antibiotics and was stable for discharge. Prior to discharge a voiding trial was attempted which he failed. Nursing attempted to replace his Dallas catheter but were unable to successfully. A dallas catheter balloon was likely inflated in his bulbar urethra. This catheter was removed upon evaluation with return of blood from the urethra. A 12 Fr silicone Dallas catheter was placed with return of light pink urine.    Per the patient he had surgery in the 50s after a urethral injury, he does not know what surgery he had. He follows with Sal[facility] Gerard and recently had an elevated PSA to 9.9. He was in the process of being worked up for microscopic hematuria.         History reviewed. No pertinent past medical history.    Past Surgical History:   Procedure Laterality Date    COLECTOMY, TOTAL, ABDOMINAL with end ileostomy N/A 6/21/2019    Performed by Dino Rodríguez MD at [facility] OR [address] [place] [address]       Review of patient's allergies indicates:  No Known Allergies    Family History     Problem Relation  (Age of Onset)    Cancer Brother    Diabetes Mother    No Known Problems Father, Sister          Tobacco Use    Smoking status: Never Smoker    Smokeless tobacco: Never Used   Substance and Sexual Activity    Alcohol use: Not Currently    Drug use: Not Currently    Sexual activity: Not on file       Review of Systems   Constitutional: Negative for activity change, chills and fever.   HENT: Negative for facial swelling.    Eyes: Negative for discharge.   Respiratory: Negative for apnea.    Cardiovascular: Negative for chest pain.   Gastrointestinal: Positive for abdominal distention and abdominal pain.   Genitourinary: Positive for difficulty urinating and hematuria.   Musculoskeletal: Negative for arthralgias.   Skin: Negative for color change.   Neurological: Negative for dizziness.   Psychiatric/Behavioral: Negative for agitation.       Objective:     Temp:  [97.3 °F (36.3 °C)-101.1 °F (38.4 °C)] 99.2 °F (37.3 °C)  Pulse:  [63-85] 85  Resp:  [15-18] 16  SpO2:  [97 %-99 %] 97 %  BP: (127-162)/(68-96) 154/74     Body mass index is 20.03 kg/m².    Date 08/24/19 0700 - 08/25/19 0659   Shift 9053-6989 1320-9264 4954-8395 24 Hour Total   INTAKE   Shift Total(mL/kg)       OUTPUT   Urine(mL/kg/hr) 900(2.1) 100  1000   Stool 200 150  350   Shift Total(mL/kg) 1100(20.1) 250(4.6)  1350(24.7)   Weight (kg) 54.6 54.6 54.6 54.6     Bladder Scan Volume (mL): 675 mL (08/21/19 1443)    Drains     Drain                 Ileostomy 06/21/19 1735 other (see comments) 64 days                Physical Exam   Nursing note and vitals reviewed.  Constitutional: He is oriented to person, place, and time. He appears well-developed and well-nourished. No distress.   HENT:   Head: Normocephalic and atraumatic.   Eyes: Conjunctivae are normal. Right eye exhibits no discharge. Left eye exhibits no discharge.   Neck: Normal range of motion. No thyromegaly present.   Cardiovascular: Normal rate and intact distal pulses.    Pulmonary/Chest:  Effort normal. No respiratory distress.   Abdominal: Soft. He exhibits distension. There is tenderness.   RLQ ileostomy    Palpable bladder prior to catheter placement   Genitourinary: Testes normal and penis normal. Right testis shows no mass, no swelling and no tenderness. Right testis is descended. Left testis shows no mass, no swelling and no tenderness. Left testis is descended. Uncircumcised. No phimosis, paraphimosis, hypospadias or penile tenderness.   Genitourinary Comments: 12 Fr silicone catheter placed with return of pink urine   Musculoskeletal: Normal range of motion. He exhibits no edema or deformity.   Neurological: He is alert and oriented to person, place, and time. No cranial nerve deficit.   Skin: Skin is warm and dry. Capillary refill takes less than 2 seconds. He is not diaphoretic.     Psychiatric: He has a normal mood and affect. His behavior is normal.       Significant Labs:    BMP:  Recent Labs   Lab 08/21/19  1453 08/22/19  0413 08/23/19  0351    137 139   K 4.4 4.1 3.6    108 106   CO2 25 19* 22*   BUN 14 10 6*   CREATININE 1.4 0.7 0.6   CALCIUM 9.7 8.7 9.1       CBC:  Recent Labs   Lab 08/21/19  1453 08/22/19  1126 08/23/19  0351   WBC 19.57* 13.48* 14.06*   HGB 12.6* 11.5* 9.6*   HCT 38.9* 37.8* 30.6*    149* 140*       Urine Culture:   Recent Labs   Lab 08/21/19  1548   LABURIN ESCHERICHIA COLI  > 100,000 cfu/ml  *     Urine Studies:   Recent Labs   Lab 08/21/19  1548   COLORU Yellow   APPEARANCEUA Hazy*   PHUR 6.0   SPECGRAV 1.010   PROTEINUA Negative   GLUCUA Negative   KETONESU Negative   BILIRUBINUA Negative   OCCULTUA 3+*   NITRITE Negative   LEUKOCYTESUR 3+*   RBCUA 33*   WBCUA 71*     All pertinent labs results from the past 24 hours have been reviewed.    Significant Imaging:  All pertinent imaging results/findings from the past 24 hours have been reviewed.                    Assessment and Plan:     Acute urinary retention  Continue home flomax  Follow up  with Dr. Kam to discuss microscopic hematuria workup as well as urethral Dallas trauma  Maintain Dallas catheter for 7-10 days to allow bladder to decompress  In the future recommend using small silicone catheter if this patient needs a catheter  Agree with culture appropriate cipro antibiotics  Please call with questions        VTE Risk Mitigation (From admission, onward)        Ordered     enoxaparin injection 40 mg  Daily      08/22/19 0307     IP VTE HIGH RISK PATIENT  Once      08/22/19 0559     Place JOSHUA hose  Until discontinued      08/22/19 0559     Place sequential compression device  Until discontinued      08/22/19 0559          Thank you for your consult. I will sign off. Please contact us if you have any additional questions.    Teo Marlow MD  Urology  [facility]    I have reviewed the notes, assessments, and/or procedures performed by Dr. Marlow, I concur with her/his documentation of Cale Anthony Sr.. Patient to go home with dallas catheter in place. F/u with Dr. Kam for management of urethra and dallas catheter.  Call with any further questions or concerns.  Will sign off.

## 2019-08-25 NOTE — NURSING
Pt D/C'e home. IV removed x2, catheter tips intact. Jauregui to gravity in place. Verbalized understanding of D/C instructions.

## 2019-08-25 NOTE — HPI
Mr. Anthony is a 89 year old male with recent history  of localized colon cancer (adenocarcinoma with negative margin and lymph nodes)  s/p colectomy with ileostomy 06/21/19 who presented to ED with complaint of urinary retention on 8/21. He was febrile to 102F, WBC 20 and lactic acid 6 and had a + urine that grew out e. Coli.    A dallas catheter was placed at that time with return of 1500ml of urine. He improved with antibiotics and was stable for discharge. Prior to discharge a voiding trial was attempted which he failed. Nursing attempted to replace his Dallas catheter but were unable to successfully. A dallas catheter balloon was likely inflated in his bulbar urethra. This catheter was removed upon evaluation with return of blood from the urethra. A 12 Fr silicone Dallas catheter was placed with return of light pink urine.    Per the patient he had surgery in the 50s after a urethral injury, he does not know what surgery he had. He follows with [facility] Gerard and recently had an elevated PSA to 9.9. He was in the process of being worked up for microscopic hematuria.

## 2019-08-25 NOTE — SUBJECTIVE & OBJECTIVE
History reviewed. No pertinent past medical history.    Past Surgical History:   Procedure Laterality Date    COLECTOMY, TOTAL, ABDOMINAL with end ileostomy N/A 6/21/2019    Performed by Dino Rodríguez MD at [facility] OR [address] [place] [address]       Review of patient's allergies indicates:  No Known Allergies    Family History     Problem Relation (Age of Onset)    Cancer Brother    Diabetes Mother    No Known Problems Father, Sister          Tobacco Use    Smoking status: Never Smoker    Smokeless tobacco: Never Used   Substance and Sexual Activity    Alcohol use: Not Currently    Drug use: Not Currently    Sexual activity: Not on file       Review of Systems   Constitutional: Negative for activity change, chills and fever.   HENT: Negative for facial swelling.    Eyes: Negative for discharge.   Respiratory: Negative for apnea.    Cardiovascular: Negative for chest pain.   Gastrointestinal: Positive for abdominal distention and abdominal pain.   Genitourinary: Positive for difficulty urinating and hematuria.   Musculoskeletal: Negative for arthralgias.   Skin: Negative for color change.   Neurological: Negative for dizziness.   Psychiatric/Behavioral: Negative for agitation.       Objective:     Temp:  [97.3 °F (36.3 °C)-101.1 °F (38.4 °C)] 99.2 °F (37.3 °C)  Pulse:  [63-85] 85  Resp:  [15-18] 16  SpO2:  [97 %-99 %] 97 %  BP: (127-162)/(68-96) 154/74     Body mass index is 20.03 kg/m².    Date 08/24/19 0700 - 08/25/19 0659   Shift 6002-5865 3940-3458 9435-1318 24 Hour Total   INTAKE   Shift Total(mL/kg)       OUTPUT   Urine(mL/kg/hr) 900(2.1) 100  1000   Stool 200 150  350   Shift Total(mL/kg) 1100(20.1) 250(4.6)  1350(24.7)   Weight (kg) 54.6 54.6 54.6 54.6     Bladder Scan Volume (mL): 675 mL (08/21/19 1443)    Drains     Drain                 Ileostomy 06/21/19 1735 other (see comments) 64 days                Physical Exam   Nursing note and vitals reviewed.  Constitutional: He is oriented to person, place, and time. He  appears well-developed and well-nourished. No distress.   HENT:   Head: Normocephalic and atraumatic.   Eyes: Conjunctivae are normal. Right eye exhibits no discharge. Left eye exhibits no discharge.   Neck: Normal range of motion. No thyromegaly present.   Cardiovascular: Normal rate and intact distal pulses.    Pulmonary/Chest: Effort normal. No respiratory distress.   Abdominal: Soft. He exhibits distension. There is tenderness.   RLQ ileostomy    Palpable bladder prior to catheter placement   Genitourinary: Testes normal and penis normal. Right testis shows no mass, no swelling and no tenderness. Right testis is descended. Left testis shows no mass, no swelling and no tenderness. Left testis is descended. Uncircumcised. No phimosis, paraphimosis, hypospadias or penile tenderness.   Genitourinary Comments: 12 Fr silicone catheter placed with return of pink urine   Musculoskeletal: Normal range of motion. He exhibits no edema or deformity.   Neurological: He is alert and oriented to person, place, and time. No cranial nerve deficit.   Skin: Skin is warm and dry. Capillary refill takes less than 2 seconds. He is not diaphoretic.     Psychiatric: He has a normal mood and affect. His behavior is normal.       Significant Labs:    BMP:  Recent Labs   Lab 08/21/19  1453 08/22/19  0413 08/23/19  0351    137 139   K 4.4 4.1 3.6    108 106   CO2 25 19* 22*   BUN 14 10 6*   CREATININE 1.4 0.7 0.6   CALCIUM 9.7 8.7 9.1       CBC:  Recent Labs   Lab 08/21/19  1453 08/22/19  1126 08/23/19  0351   WBC 19.57* 13.48* 14.06*   HGB 12.6* 11.5* 9.6*   HCT 38.9* 37.8* 30.6*    149* 140*       Urine Culture:   Recent Labs   Lab 08/21/19  1548   LABURIN ESCHERICHIA COLI  > 100,000 cfu/ml  *     Urine Studies:   Recent Labs   Lab 08/21/19  1548   COLORU Yellow   APPEARANCEUA Hazy*   PHUR 6.0   SPECGRAV 1.010   PROTEINUA Negative   GLUCUA Negative   KETONESU Negative   BILIRUBINUA Negative   OCCULTUA 3+*   NITRITE  Negative   LEUKOCYTESUR 3+*   RBCUA 33*   WBCUA 71*     All pertinent labs results from the past 24 hours have been reviewed.    Significant Imaging:  All pertinent imaging results/findings from the past 24 hours have been reviewed.

## 2019-08-26 LAB — BACTERIA BLD CULT: NORMAL

## 2019-08-27 ENCOUNTER — PATIENT OUTREACH (OUTPATIENT)
Dept: ADMINISTRATIVE | Facility: CLINIC | Age: 84
End: 2019-08-27

## 2019-08-27 LAB
BACTERIA BLD CULT: NORMAL
BACTERIA BLD CULT: NORMAL

## 2019-08-27 NOTE — PATIENT INSTRUCTIONS
Sepsis  Sepsis occurs when your body responds to bacteremia - the presence of bacteria in your bloodstream. Sepsis can be deadly. Blood pressure may drop and the lungs and kidneys may start to fail. Emergency care for sepsis is crucial.  Risk Factors  Those most at risk for sepsis are:  Infants or older adults  People who have an illness such as cancer, AIDS, or diabetes  People being treated with chemotherapy medications or radiation  People who have had a transplant  People with an infection such as pneumonia, meningitis, or a urinary tract infection  When to Go to the Emergency Department (ED)  Sepsis is a medical emergency. Go to the nearest emergency department if a fever is present with any of these symptoms:  Chills and shaking  Fever or low body temperature (hypothermia)  Rapid heartbeat and breathing; shortness of breath  Nausea or vomiting  Confusion, dizziness  Skin rash  Decreased urination  What to Expect in the ED  Blood and urine tests are done to look for the presence of bacteria.  A blood culture may be done. In this test, a blood sample is sent to a lab, where its placed in a special container. Any bacteria in the blood should grow in 24 hours.  X-rays may be taken or other imaging tests may be done.  A person with sepsis will be admitted to the hospital and treated with antibiotics. Treatment may also include oxygen and intravenous fluids.  © 1672-5936 [facility] [facility], [address] [place] [address], [place] [address]. All rights reserved. This information is not intended as a substitute for professional medical care. Always follow your healthcare professional's instructions.

## 2019-09-03 ENCOUNTER — OFFICE VISIT (OUTPATIENT)
Dept: UROLOGY | Facility: CLINIC | Age: 84
End: 2019-09-03
Payer: MEDICARE

## 2019-09-03 VITALS
BODY MASS INDEX: 20.75 KG/M2 | HEART RATE: 80 BPM | SYSTOLIC BLOOD PRESSURE: 129 MMHG | DIASTOLIC BLOOD PRESSURE: 67 MMHG | HEIGHT: 65 IN | WEIGHT: 124.56 LBS

## 2019-09-03 DIAGNOSIS — Z97.8 FOLEY CATHETER PRESENT: ICD-10-CM

## 2019-09-03 DIAGNOSIS — R33.9 URINARY RETENTION: Primary | ICD-10-CM

## 2019-09-03 PROCEDURE — 99999 PR PBB SHADOW E&M-EST. PATIENT-LVL III: ICD-10-PCS | Mod: PBBFAC,,, | Performed by: NURSE PRACTITIONER

## 2019-09-03 PROCEDURE — 99214 OFFICE O/P EST MOD 30 MIN: CPT | Mod: S$GLB,,, | Performed by: NURSE PRACTITIONER

## 2019-09-03 PROCEDURE — 99214 PR OFFICE/OUTPT VISIT, EST, LEVL IV, 30-39 MIN: ICD-10-PCS | Mod: S$GLB,,, | Performed by: NURSE PRACTITIONER

## 2019-09-03 PROCEDURE — 99999 PR PBB SHADOW E&M-EST. PATIENT-LVL III: CPT | Mod: PBBFAC,,, | Performed by: NURSE PRACTITIONER

## 2019-09-03 PROCEDURE — 99499 UNLISTED E&M SERVICE: CPT | Mod: S$GLB,,, | Performed by: NURSE PRACTITIONER

## 2019-09-03 PROCEDURE — 1101F PT FALLS ASSESS-DOCD LE1/YR: CPT | Mod: CPTII,S$GLB,, | Performed by: NURSE PRACTITIONER

## 2019-09-03 PROCEDURE — 99499 RISK ADDL DX/OHS AUDIT: ICD-10-PCS | Mod: S$GLB,,, | Performed by: NURSE PRACTITIONER

## 2019-09-03 PROCEDURE — 1101F PR PT FALLS ASSESS DOC 0-1 FALLS W/OUT INJ PAST YR: ICD-10-PCS | Mod: CPTII,S$GLB,, | Performed by: NURSE PRACTITIONER

## 2019-09-03 NOTE — PROGRESS NOTES
CHIEF COMPLAINT:    Mr. Anthony is a 89 y.o. male presenting for dallas removal.  PRESENTING ILLNESS:    Cale Anthony Sr. is a 89 y.o. male with a PMH of UTI and urethral stricture who presents for dallas removal. His last clinic visit was 7/9/19 with INNA Gerard NP. He is accompanied by his son.    Recent history  of localized colon cancer (adenocarcinoma with negative margin and lymph nodes)  s/p colectomy with ileostomy 06/21/19 who presented to ED with complaint of urinary retention on 8/21. He was febrile to 102F, WBC 20 and lactic acid 6 and had a + urine that grew out e. Coli.  A dallas catheter was placed at that time with return of 1500ml of urine. He improved with antibiotics and was stable for discharge. Prior to discharge a voiding trial was attempted which he failed. Nursing attempted to replace his Dallas catheter but were unable to successfully. A dallas catheter balloon was likely inflated in his bulbar urethra. This catheter was removed upon evaluation with return of blood from the urethra. A 12 Fr silicone Dallas catheter was placed with return of light pink urine.  Per the patient he had surgery in the 50s after a urethral injury, he does not know what surgery he had. He follows with Salome Gerard and recently had an elevated PSA to 9.9. He was in the process of being worked up for microscopic hematuria.     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 12 Turkish silicon catheter placed by Urology. Had cysto scheduled for microscopic hematuria and possible stricture but was cancelled.    8/21/19 CT Abd pelvis with contrast  The kidneys are symmetric.  No hydronephrosis. Subcentimeter renal hypodensities are too small to definitively characterize, but likely represent cysts.  Urinary bladder is moderately distended and contains a Dallas catheter.  There is a moderate volume of air within the bladder likely from Dallas catheter  placement.  Prostate is significantly enlarged, measuring approximately 6.3 cm in transverse width, similar to the prior study.     Today patient presents to clinic for dallas removal. He reports catheter has been draining without difficulty. Hematuria has cleared since discharge and he is having clear yellow output. Denies bladder spasms, flank pain, abdominal pain, fever or chills. He takes flomax for BPH. He completed cipro and feels better since UTI was treated.  Denies urinary retention since the 50's after urethral injury.  Baseline, nocturia x 2-3, daytime frequency and occasional urgency. FOS good.      REVIEW OF SYSTEMS:    Review of Systems    Constitutional: Negative for fever and chills.   HENT: Negative for hearing loss.   Eyes: Negative for eye discharge.   Respiratory: Negative for shortness of breath.   Cardiovascular: Negative for chest pain.   Gastrointestinal: Negative for nausea, vomiting   Genitourinary:  See HPI  Neurological: Negative for dizziness.   Hematological: Does not bruise/bleed easily.   Psychiatric/Behavioral: Negative for confusion.       PATIENT HISTORY:    History reviewed. No pertinent past medical history.    Past Surgical History:   Procedure Laterality Date    COLECTOMY, TOTAL, ABDOMINAL with end ileostomy N/A 6/21/2019    Performed by Dino Rodríguez MD at [facility] OR [address] [place] [address]       Family History   Problem Relation Age of Onset    Diabetes Mother     No Known Problems Father     No Known Problems Sister     Cancer Brother        Social History     Socioeconomic History    Marital status: Single     Spouse name: Not on file    Number of children: Not on file    Years of education: Not on file    Highest education level: Not on file   Occupational History    Not on file   Social Needs    Financial resource strain: Not on file    Food insecurity:     Worry: Not on file     Inability: Not on file    Transportation needs:     Medical: Not on file     Non-medical: Not on  file   Tobacco Use    Smoking status: Never Smoker    Smokeless tobacco: Never Used   Substance and Sexual Activity    Alcohol use: Not Currently    Drug use: Not Currently    Sexual activity: Not on file   Lifestyle    Physical activity:     Days per week: Not on file     Minutes per session: Not on file    Stress: Not on file   Relationships    Social connections:     Talks on phone: Not on file     Gets together: Not on file     Attends Anglican service: Not on file     Active member of club or organization: Not on file     Attends meetings of clubs or organizations: Not on file     Relationship status: Not on file   Other Topics Concern    Not on file   Social History Narrative    Not on file       Allergies:  Patient has no known allergies.    Medications:    Current Outpatient Medications:     ergocalciferol (ERGOCALCIFEROL) 50,000 unit Cap, Take 1 capsule (50,000 Units total) by mouth every 7 days., Disp: 12 capsule, Rfl: 3    tamsulosin (FLOMAX) 0.4 mg Cap, Take 1 capsule (0.4 mg total) by mouth once daily., Disp: 30 capsule, Rfl: 11    PHYSICAL EXAMINATION:    Constitutional: He is oriented to person, place, and time. He appears well-developed and well-nourished.  He is in no apparent distress.    Neck: Normal ROM.     Cardiovascular: Normal rate.      Pulmonary/Chest: Effort normal. No respiratory distress.     Abdominal:  Ileostomy. There is no CVA tenderness.     Lymphadenopathy:        Right: No supraclavicular adenopathy present.        Left: No supraclavicular adenopathy present.     Neurological: He is alert and oriented to person, place, and time.     Skin: Skin is warm and dry.     Extremities: Normal ROM    Psych: Cooperative with normal affect.    Genitourinary: Indwelling dallas catheter in place    Physical Exam      LABS:    Lab Results   Component Value Date    PSA 5.7 (H) 05/24/2019    PSADIAG 9.9 (H) 07/09/2019       IMPRESSION:    Encounter Diagnoses   Name Primary?    Urinary  retention Yes    Dallas catheter present          PLAN:  -Discussed options with patient regarding dallas catheter.  Can do VT today in clinic and if unsuccessful, replace dallas catheter.  Can learn CIC  Continue with dallas catheter until cysto.  Patient and his son do not feel he will be able to perform CIC.   They are concerned regarding removing dallas and risk of retention and infection again.   Patient and his son would like to leave catheter in place until cysto.  -Good water intake  -Continue flomax  -Cysto scheduled with Dr. Kam.  -Pt will return to clinic 9/24/19 for catheter change and urine culture prior to cysto    I spent 35 minutes with the patient of which more than half was spent in coordinating the patient's care as well as in direct consultation with the patient in regards to our treatment and plan.

## 2019-09-20 ENCOUNTER — PATIENT OUTREACH (OUTPATIENT)
Dept: ADMINISTRATIVE | Facility: OTHER | Age: 84
End: 2019-09-20

## 2019-09-24 ENCOUNTER — OFFICE VISIT (OUTPATIENT)
Dept: UROLOGY | Facility: CLINIC | Age: 84
End: 2019-09-24
Payer: MEDICARE

## 2019-09-24 DIAGNOSIS — R33.9 URINARY RETENTION: Primary | ICD-10-CM

## 2019-09-24 PROCEDURE — 99999 PR PBB SHADOW E&M-EST. PATIENT-LVL II: ICD-10-PCS | Mod: PBBFAC,,, | Performed by: NURSE PRACTITIONER

## 2019-09-24 PROCEDURE — 1101F PT FALLS ASSESS-DOCD LE1/YR: CPT | Mod: CPTII,S$GLB,, | Performed by: NURSE PRACTITIONER

## 2019-09-24 PROCEDURE — 51702 PR INSERTION OF TEMPORARY INDWELLING BLADDER CATHETER, SIMPLE: ICD-10-PCS | Mod: S$GLB,,, | Performed by: NURSE PRACTITIONER

## 2019-09-24 PROCEDURE — 51702 INSERT TEMP BLADDER CATH: CPT | Mod: S$GLB,,, | Performed by: NURSE PRACTITIONER

## 2019-09-24 PROCEDURE — 99214 OFFICE O/P EST MOD 30 MIN: CPT | Mod: 25,S$GLB,, | Performed by: NURSE PRACTITIONER

## 2019-09-24 PROCEDURE — 99214 PR OFFICE/OUTPT VISIT, EST, LEVL IV, 30-39 MIN: ICD-10-PCS | Mod: 25,S$GLB,, | Performed by: NURSE PRACTITIONER

## 2019-09-24 PROCEDURE — 99999 PR PBB SHADOW E&M-EST. PATIENT-LVL II: CPT | Mod: PBBFAC,,, | Performed by: NURSE PRACTITIONER

## 2019-09-24 PROCEDURE — 87086 URINE CULTURE/COLONY COUNT: CPT

## 2019-09-24 PROCEDURE — 1101F PR PT FALLS ASSESS DOC 0-1 FALLS W/OUT INJ PAST YR: ICD-10-PCS | Mod: CPTII,S$GLB,, | Performed by: NURSE PRACTITIONER

## 2019-09-24 RX ORDER — TAMSULOSIN HYDROCHLORIDE 0.4 MG/1
0.4 CAPSULE ORAL DAILY
Qty: 30 CAPSULE | Refills: 11 | Status: SHIPPED | OUTPATIENT
Start: 2019-09-24 | End: 2020-09-22 | Stop reason: SDUPTHER

## 2019-09-24 NOTE — PROGRESS NOTES
CHIEF COMPLAINT:    Mr. Anthony is a 89 y.o. male presenting for dallas change and urine culture  PRESENTING ILLNESS:    Cale Anthony Sr. is a 89 y.o. male with a PMH of UTI and urethral stricture who presents for dallas removal. His last clinic visit was 9/3/19. He is accompanied by his son.    Recent history  of localized colon cancer (adenocarcinoma with negative margin and lymph nodes)  s/p colectomy with ileostomy 06/21/19 who presented to ED with complaint of urinary retention on 8/21. He was febrile to 102F, WBC 20 and lactic acid 6 and had a + urine that grew out e. Coli.  A dallas catheter was placed at that time with return of 1500ml of urine. He improved with antibiotics and was stable for discharge. Prior to discharge a voiding trial was attempted which he failed. Nursing attempted to replace his Dallas catheter but were unable to successfully. A dallas catheter balloon was likely inflated in his bulbar urethra. This catheter was removed upon evaluation with return of blood from the urethra. A 12 Fr silicone Dallas catheter was placed with return of light pink urine.  Per the patient he had surgery in the 50s after a urethral injury, he does not know what surgery he had. He follows with Salome Gerard and recently had an elevated PSA to 9.9. He was in the process of being worked up for microscopic hematuria.     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 12 Maori silicon catheter placed by Urology. Had cysto scheduled for microscopic hematuria and possible stricture but was cancelled.    8/21/19 CT Abd pelvis with contrast  The kidneys are symmetric.  No hydronephrosis. Subcentimeter renal hypodensities are too small to definitively characterize, but likely represent cysts.  Urinary bladder is moderately distended and contains a Dallas catheter.  There is a moderate volume of air within the bladder likely from Dallas catheter  placement.  Prostate is significantly enlarged, measuring approximately 6.3 cm in transverse width, similar to the prior study.     At last visit, patient and his son decided to keep catheter in place until cysto with Dr. Kam. Today patient presents to clinic for catheter change and urine culture prior to cysto 10/10/19. Patient reports catheter has been draining without difficulty. Denies hematuria, flank pain, abdominal/back pain, fever or chills. He has been drinking water. Taking flomax.     REVIEW OF SYSTEMS:    Review of Systems    Constitutional: Negative for fever and chills.   HENT: Negative for hearing loss.   Eyes: Negative for eye discharge.   Respiratory: Negative for shortness of breath.   Cardiovascular: Negative for chest pain.   Gastrointestinal: Negative for nausea, vomiting   Genitourinary:  See HPI  Neurological: Negative for dizziness.   Hematological: Does not bruise/bleed easily.   Psychiatric/Behavioral: Negative for confusion.       PATIENT HISTORY:    No past medical history on file.    Past Surgical History:   Procedure Laterality Date    TOTAL ABDOMINAL COLECTOMY N/A 6/21/2019    Procedure: COLECTOMY, TOTAL, ABDOMINAL with end ileostomy;  Surgeon: Dino Rodríguez MD;  Location: [facility] OR [address] [place] [address];  Service: Colon and Rectal;  Laterality: N/A;       Family History   Problem Relation Age of Onset    Diabetes Mother     No Known Problems Father     No Known Problems Sister     Cancer Brother        Social History     Socioeconomic History    Marital status: Single     Spouse name: Not on file    Number of children: Not on file    Years of education: Not on file    Highest education level: Not on file   Occupational History    Not on file   Social Needs    Financial resource strain: Not on file    Food insecurity:     Worry: Not on file     Inability: Not on file    Transportation needs:     Medical: Not on file     Non-medical: Not on file   Tobacco Use    Smoking status: Never  Smoker    Smokeless tobacco: Never Used   Substance and Sexual Activity    Alcohol use: Not Currently    Drug use: Not Currently    Sexual activity: Not on file   Lifestyle    Physical activity:     Days per week: Not on file     Minutes per session: Not on file    Stress: Not on file   Relationships    Social connections:     Talks on phone: Not on file     Gets together: Not on file     Attends Rastafari service: Not on file     Active member of club or organization: Not on file     Attends meetings of clubs or organizations: Not on file     Relationship status: Not on file   Other Topics Concern    Not on file   Social History Narrative    Not on file       Allergies:  Patient has no known allergies.    Medications:    Current Outpatient Medications:     ergocalciferol (ERGOCALCIFEROL) 50,000 unit Cap, Take 1 capsule (50,000 Units total) by mouth every 7 days., Disp: 12 capsule, Rfl: 3    tamsulosin (FLOMAX) 0.4 mg Cap, Take 1 capsule (0.4 mg total) by mouth once daily., Disp: 30 capsule, Rfl: 11    PHYSICAL EXAMINATION:    Constitutional: He is oriented to person, place, and time. He appears well-developed and well-nourished.  He is in no apparent distress.    Neck: Normal ROM.     Cardiovascular: Normal rate.      Pulmonary/Chest: Effort normal. No respiratory distress.     Abdominal:  Ileostomy. There is no CVA tenderness.     Lymphadenopathy:        Right: No supraclavicular adenopathy present.        Left: No supraclavicular adenopathy present.     Neurological: He is alert and oriented to person, place, and time.     Skin: Skin is warm and dry.     Extremities: Normal ROM    Psych: Cooperative with normal affect.    Genitourinary: Indwelling dallas catheter in place    Physical Exam      LABS:    Lab Results   Component Value Date    PSA 5.7 (H) 05/24/2019    PSADIAG 9.9 (H) 07/09/2019       IMPRESSION:    Encounter Diagnoses   Name Primary?    Urinary retention Yes         PLAN:  -Dallas catheter  was changed by Christen Orourke NP using sterile procedure. 10cc of sterile water was removed to deflate the balloon and catheter was removed. Urethra meatus was prepped with betadine. 12 fr silicon catheter placed. Balloon inflated with 10cc of sterile water. Urine specimen collected and sent for culture. Catheter was connected to leg bag and secured to leg.  -Good water intake  -Refilled flomax  -Cysto scheduled with Dr. Kam 10/10/19    I spent 25 minutes with the patient of which more than half was spent in coordinating the patient's care as well as in direct consultation with the patient in regards to our treatment and plan.

## 2019-09-24 NOTE — H&P (VIEW-ONLY)
CHIEF COMPLAINT:    Mr. Anthony is a 89 y.o. male presenting for dallas change and urine culture  PRESENTING ILLNESS:    Cale Anthony Sr. is a 89 y.o. male with a PMH of UTI and urethral stricture who presents for dallas removal. His last clinic visit was 9/3/19. He is accompanied by his son.    Recent history  of localized colon cancer (adenocarcinoma with negative margin and lymph nodes)  s/p colectomy with ileostomy 06/21/19 who presented to ED with complaint of urinary retention on 8/21. He was febrile to 102F, WBC 20 and lactic acid 6 and had a + urine that grew out e. Coli.  A dallas catheter was placed at that time with return of 1500ml of urine. He improved with antibiotics and was stable for discharge. Prior to discharge a voiding trial was attempted which he failed. Nursing attempted to replace his Dallas catheter but were unable to successfully. A dallas catheter balloon was likely inflated in his bulbar urethra. This catheter was removed upon evaluation with return of blood from the urethra. A 12 Fr silicone Dallas catheter was placed with return of light pink urine.  Per the patient he had surgery in the 50s after a urethral injury, he does not know what surgery he had. He follows with Salome Gerard and recently had an elevated PSA to 9.9. He was in the process of being worked up for microscopic hematuria.     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 12 Welsh silicon catheter placed by Urology. Had cysto scheduled for microscopic hematuria and possible stricture but was cancelled.    8/21/19 CT Abd pelvis with contrast  The kidneys are symmetric.  No hydronephrosis. Subcentimeter renal hypodensities are too small to definitively characterize, but likely represent cysts.  Urinary bladder is moderately distended and contains a Dallas catheter.  There is a moderate volume of air within the bladder likely from Dallas catheter  placement.  Prostate is significantly enlarged, measuring approximately 6.3 cm in transverse width, similar to the prior study.     At last visit, patient and his son decided to keep catheter in place until cysto with Dr. Kam. Today patient presents to clinic for catheter change and urine culture prior to cysto 10/10/19. Patient reports catheter has been draining without difficulty. Denies hematuria, flank pain, abdominal/back pain, fever or chills. He has been drinking water. Taking flomax.     REVIEW OF SYSTEMS:    Review of Systems    Constitutional: Negative for fever and chills.   HENT: Negative for hearing loss.   Eyes: Negative for eye discharge.   Respiratory: Negative for shortness of breath.   Cardiovascular: Negative for chest pain.   Gastrointestinal: Negative for nausea, vomiting   Genitourinary:  See HPI  Neurological: Negative for dizziness.   Hematological: Does not bruise/bleed easily.   Psychiatric/Behavioral: Negative for confusion.       PATIENT HISTORY:    No past medical history on file.    Past Surgical History:   Procedure Laterality Date    TOTAL ABDOMINAL COLECTOMY N/A 6/21/2019    Procedure: COLECTOMY, TOTAL, ABDOMINAL with end ileostomy;  Surgeon: Dino Rodríguez MD;  Location: [facility] OR [address] [place] [address];  Service: Colon and Rectal;  Laterality: N/A;       Family History   Problem Relation Age of Onset    Diabetes Mother     No Known Problems Father     No Known Problems Sister     Cancer Brother        Social History     Socioeconomic History    Marital status: Single     Spouse name: Not on file    Number of children: Not on file    Years of education: Not on file    Highest education level: Not on file   Occupational History    Not on file   Social Needs    Financial resource strain: Not on file    Food insecurity:     Worry: Not on file     Inability: Not on file    Transportation needs:     Medical: Not on file     Non-medical: Not on file   Tobacco Use    Smoking status: Never  Smoker    Smokeless tobacco: Never Used   Substance and Sexual Activity    Alcohol use: Not Currently    Drug use: Not Currently    Sexual activity: Not on file   Lifestyle    Physical activity:     Days per week: Not on file     Minutes per session: Not on file    Stress: Not on file   Relationships    Social connections:     Talks on phone: Not on file     Gets together: Not on file     Attends Mandaeism service: Not on file     Active member of club or organization: Not on file     Attends meetings of clubs or organizations: Not on file     Relationship status: Not on file   Other Topics Concern    Not on file   Social History Narrative    Not on file       Allergies:  Patient has no known allergies.    Medications:    Current Outpatient Medications:     ergocalciferol (ERGOCALCIFEROL) 50,000 unit Cap, Take 1 capsule (50,000 Units total) by mouth every 7 days., Disp: 12 capsule, Rfl: 3    tamsulosin (FLOMAX) 0.4 mg Cap, Take 1 capsule (0.4 mg total) by mouth once daily., Disp: 30 capsule, Rfl: 11    PHYSICAL EXAMINATION:    Constitutional: He is oriented to person, place, and time. He appears well-developed and well-nourished.  He is in no apparent distress.    Neck: Normal ROM.     Cardiovascular: Normal rate.      Pulmonary/Chest: Effort normal. No respiratory distress.     Abdominal:  Ileostomy. There is no CVA tenderness.     Lymphadenopathy:        Right: No supraclavicular adenopathy present.        Left: No supraclavicular adenopathy present.     Neurological: He is alert and oriented to person, place, and time.     Skin: Skin is warm and dry.     Extremities: Normal ROM    Psych: Cooperative with normal affect.    Genitourinary: Indwelling dallas catheter in place    Physical Exam      LABS:    Lab Results   Component Value Date    PSA 5.7 (H) 05/24/2019    PSADIAG 9.9 (H) 07/09/2019       IMPRESSION:    Encounter Diagnoses   Name Primary?    Urinary retention Yes         PLAN:  -Dallas catheter  was changed by Christen Orourke NP using sterile procedure. 10cc of sterile water was removed to deflate the balloon and catheter was removed. Urethra meatus was prepped with betadine. 12 fr silicon catheter placed. Balloon inflated with 10cc of sterile water. Urine specimen collected and sent for culture. Catheter was connected to leg bag and secured to leg.  -Good water intake  -Refilled flomax  -Cysto scheduled with Dr. Kam 10/10/19    I spent 25 minutes with the patient of which more than half was spent in coordinating the patient's care as well as in direct consultation with the patient in regards to our treatment and plan.

## 2019-09-25 LAB
BACTERIA UR CULT: NORMAL
BACTERIA UR CULT: NORMAL

## 2019-09-26 ENCOUNTER — TELEPHONE (OUTPATIENT)
Dept: UROLOGY | Facility: CLINIC | Age: 84
End: 2019-09-26

## 2019-09-26 NOTE — TELEPHONE ENCOUNTER
Notified pt family member that pt urine cx is negative for infection. Pt family member voiced understanding          ----- Message from Yaz Giles LPN sent at 9/25/2019  4:18 PM CDT -----        Preferred Name:   Conner Anthony Sr.  Male, 89 y.o., 7/15/1930  Phone:   [phone] (h) ...  PCP:   Mal Santoyo MD  Language:   English  Need Interp:   No  Allergies Last Reviewed:   09/24/19  Allergies:   No Known Allergies  Health Maintenance:   Due  Primary Ins.:   [employer]  MRN:   [id number]  Pt Comm Pref:   Patient Portal, Mail  Last [facility] Login:   7/18/2019 6:47 AM  Next Appt:   With Urology ([facility])  10/10/2019 at 8:30 AM  My Sticky Note:     Specialty Comments:     Message   Received: Today   Message Contents   Christen Orourke NP  P Sharad Velásquez Staff         Please notify patient his urine culture was negative.   Associated Results     Result Notes for Urine culture     Notes recorded by Christen Orourke NP on 9/25/2019 at 4:16 PM CDT  Please notify patient his urine culture was negative.  Urine culture   Order: [id number]   Status:  Final result   Visible to patient:  No (Not Released) Dx:  Urinary retention   Specimen Information: Urine, Catheterized      Component 1d ago  Urine Culture, Routine Multiple organisms isolated. None in predominance.  Repeat if   Urine Culture, Routine clinically necessary.   Resulting Agency [facility]      Specimen Collected: 09/24/19 10:27 Last Resulted: 09/25/19 16:08       Lab Flowsheet      Order Details      View Encounter      Lab and Collection Details      Routing      Result History

## 2019-10-10 ENCOUNTER — HOSPITAL ENCOUNTER (OUTPATIENT)
Dept: UROLOGY | Facility: HOSPITAL | Age: 84
Discharge: HOME | End: 2019-10-10
Attending: UROLOGY
Payer: MEDICARE

## 2019-10-10 VITALS
TEMPERATURE: 98 F | DIASTOLIC BLOOD PRESSURE: 67 MMHG | BODY MASS INDEX: 20.75 KG/M2 | SYSTOLIC BLOOD PRESSURE: 148 MMHG | WEIGHT: 124.56 LBS | HEART RATE: 53 BPM | RESPIRATION RATE: 17 BRPM | HEIGHT: 65 IN

## 2019-10-10 DIAGNOSIS — R33.8 ACUTE URINARY RETENTION: Primary | ICD-10-CM

## 2019-10-10 DIAGNOSIS — R82.71 BACTERIA IN URINE: ICD-10-CM

## 2019-10-10 DIAGNOSIS — N39.0 URINARY TRACT INFECTION WITHOUT HEMATURIA, SITE UNSPECIFIED: ICD-10-CM

## 2019-10-10 DIAGNOSIS — N30.00 ACUTE CYSTITIS WITHOUT HEMATURIA: ICD-10-CM

## 2019-10-10 DIAGNOSIS — R31.29 MICROSCOPIC HEMATURIA: ICD-10-CM

## 2019-10-10 DIAGNOSIS — N35.819 OTHER URETHRAL STRICTURE, MALE, UNSPECIFIED SITE: ICD-10-CM

## 2019-10-10 PROCEDURE — 52281 CYSTOSCOPY AND TREATMENT: CPT

## 2019-10-10 PROCEDURE — 52000 CYSTOURETHROSCOPY: CPT

## 2019-10-10 RX ORDER — CIPROFLOXACIN 500 MG/1
500 TABLET ORAL
Status: COMPLETED | OUTPATIENT
Start: 2019-10-10 | End: 2019-10-10

## 2019-10-10 RX ORDER — LIDOCAINE HYDROCHLORIDE 20 MG/ML
JELLY TOPICAL
Status: COMPLETED | OUTPATIENT
Start: 2019-10-10 | End: 2019-10-10

## 2019-10-10 RX ORDER — SULFAMETHOXAZOLE AND TRIMETHOPRIM 800; 160 MG/1; MG/1
1 TABLET ORAL 2 TIMES DAILY
Qty: 14 TABLET | Refills: 0 | Status: SHIPPED | OUTPATIENT
Start: 2019-10-10 | End: 2019-10-17

## 2019-10-10 RX ADMIN — LIDOCAINE HYDROCHLORIDE: 20 JELLY TOPICAL at 08:10

## 2019-10-10 RX ADMIN — CIPROFLOXACIN 500 MG: 500 TABLET ORAL at 08:10

## 2019-10-10 NOTE — PATIENT INSTRUCTIONS
What to Expect After a Cystoscopy  For the next 24-48 hours, you may feel a mild burning when you urinate. This burning is normal and expected. Drink plenty of water to dilute the urine to help relieve the burning sensation. You may also see a small amount of blood in your urine after the procedure.    Unless you are already taking antibiotics, you may be given an antibiotic after the test to prevent infection.    Signs and Symptoms to Report  Call the [facility] at [phone] if you develop any of the following:  · Fever of 101 degrees or higher  · Chills or persistent bleeding  · Inability to urinate

## 2019-10-10 NOTE — PROCEDURES
Date of Procedure: 10/10/2019    Procedure:  1. Male Diagnostic Cystourethroscopy  2.  Urethral calibration and dilation.    Pre-op diagnosis: Urethral stricture  Post-op diagnosis: same  Anesthesia: Local  Surgeon:  Lorenzo Kam MD    Findings:  Urethra: penile urethral stricture 14 F in size.  Unable to pass the flexible scope easily.  Guidewire placed under direct vision thru the stricture area and the stricture was dilated up to 16 F in size by using Haymen dilators.   Sphincter: competent.  Prostate: 4 cm minimal obstruction  Bladder neck: patent with no stricture  Bladder:  Normal bladder. Cloudy urine  Normal ureteral orifices bilaterally.   Moderate trabeculation.     Description of Procedure:                                                         Informed Consent:                                                            - Risks, benefits and alternatives of procedure discussed with               patient and informed consent obtained.       Patient Position:   - Supine. --- Bladder ---   Prep and Drape:   - Patient prepped and draped in usual sterile fashion using povidone     iodine (Betadine).   Instruments:   - 16 Fr flexible cystoscope with 0 degree lens.   Procedure Details:   - Cystoscope passed under vision into bladder.   - Bladder and urethra examined in their entirety with findings as     above.     Conclusion:  1. Urinary retention with hx of E. Coli UTI  2. Penile urethral stricture    He was able to urinate following the procedure.  Urine culture today.  Bactrim DS for 1 wk.    Plan:  Patient was discharged home in a stable condition.  Medications: cipro  Follow up:  2 months in clinic to check PVR

## 2019-10-15 ENCOUNTER — TELEPHONE (OUTPATIENT)
Dept: UROLOGY | Facility: CLINIC | Age: 84
End: 2019-10-15

## 2019-10-15 DIAGNOSIS — T83.511S URINARY TRACT INFECTION ASSOCIATED WITH INDWELLING URETHRAL CATHETER, SEQUELA: Primary | ICD-10-CM

## 2019-10-15 DIAGNOSIS — N39.0 URINARY TRACT INFECTION ASSOCIATED WITH INDWELLING URETHRAL CATHETER, SEQUELA: Primary | ICD-10-CM

## 2019-10-15 RX ORDER — FLUCONAZOLE 100 MG/1
100 TABLET ORAL DAILY
Qty: 7 TABLET | Refills: 0 | Status: SHIPPED | OUTPATIENT
Start: 2019-10-15 | End: 2019-10-22

## 2019-10-15 NOTE — TELEPHONE ENCOUNTER
Urinary tract infection associated with indwelling urethral catheter, sequela  -     fluconazole (DIFLUCAN) 100 MG tablet; Take 1 tablet (100 mg total) by mouth once daily. for 7 days  Dispense: 7 tablet; Refill: 0    OK to stop Bactrim and take diflucan for candida UTI from indwelling catheter.  Please ask pt whether he has been able to void well since his dallas was removed.  Will see him in 2 months for follow up.

## 2019-11-27 ENCOUNTER — PATIENT OUTREACH (OUTPATIENT)
Dept: ADMINISTRATIVE | Facility: OTHER | Age: 84
End: 2019-11-27

## 2019-12-02 ENCOUNTER — OFFICE VISIT (OUTPATIENT)
Dept: SURGERY | Facility: CLINIC | Age: 84
End: 2019-12-02
Payer: MEDICARE

## 2019-12-02 ENCOUNTER — LAB VISIT (OUTPATIENT)
Dept: LAB | Facility: HOSPITAL | Age: 84
End: 2019-12-02
Attending: COLON & RECTAL SURGERY
Payer: MEDICARE

## 2019-12-02 VITALS
DIASTOLIC BLOOD PRESSURE: 71 MMHG | BODY MASS INDEX: 21.74 KG/M2 | HEART RATE: 57 BPM | SYSTOLIC BLOOD PRESSURE: 183 MMHG | WEIGHT: 130.5 LBS | HEIGHT: 65 IN

## 2019-12-02 DIAGNOSIS — Z85.038 HISTORY OF COLON CANCER, STAGE III: ICD-10-CM

## 2019-12-02 DIAGNOSIS — Z85.038 HISTORY OF COLON CANCER, STAGE III: Primary | ICD-10-CM

## 2019-12-02 DIAGNOSIS — Z93.2 ILEOSTOMY IN PLACE: ICD-10-CM

## 2019-12-02 LAB
ALBUMIN SERPL BCP-MCNC: 3.6 G/DL (ref 3.5–5.2)
ALP SERPL-CCNC: 44 U/L (ref 55–135)
ALT SERPL W/O P-5'-P-CCNC: 12 U/L (ref 10–44)
ANION GAP SERPL CALC-SCNC: 4 MMOL/L (ref 8–16)
AST SERPL-CCNC: 15 U/L (ref 10–40)
BASOPHILS # BLD AUTO: 0.03 K/UL (ref 0–0.2)
BASOPHILS NFR BLD: 0.6 % (ref 0–1.9)
BILIRUB SERPL-MCNC: 0.4 MG/DL (ref 0.1–1)
BUN SERPL-MCNC: 13 MG/DL (ref 8–23)
CALCIUM SERPL-MCNC: 9.5 MG/DL (ref 8.7–10.5)
CEA SERPL-MCNC: 1.1 NG/ML (ref 0–5)
CHLORIDE SERPL-SCNC: 104 MMOL/L (ref 95–110)
CO2 SERPL-SCNC: 32 MMOL/L (ref 23–29)
CREAT SERPL-MCNC: 0.7 MG/DL (ref 0.5–1.4)
DIFFERENTIAL METHOD: ABNORMAL
EOSINOPHIL # BLD AUTO: 0.1 K/UL (ref 0–0.5)
EOSINOPHIL NFR BLD: 2.3 % (ref 0–8)
ERYTHROCYTE [DISTWIDTH] IN BLOOD BY AUTOMATED COUNT: 14.8 % (ref 11.5–14.5)
EST. GFR  (AFRICAN AMERICAN): >60 ML/MIN/1.73 M^2
EST. GFR  (NON AFRICAN AMERICAN): >60 ML/MIN/1.73 M^2
GLUCOSE SERPL-MCNC: 93 MG/DL (ref 70–110)
HCT VFR BLD AUTO: 39.6 % (ref 40–54)
HGB BLD-MCNC: 12.6 G/DL (ref 14–18)
IMM GRANULOCYTES # BLD AUTO: 0.01 K/UL (ref 0–0.04)
IMM GRANULOCYTES NFR BLD AUTO: 0.2 % (ref 0–0.5)
LYMPHOCYTES # BLD AUTO: 1.9 K/UL (ref 1–4.8)
LYMPHOCYTES NFR BLD: 36.5 % (ref 18–48)
MCH RBC QN AUTO: 27.4 PG (ref 27–31)
MCHC RBC AUTO-ENTMCNC: 31.8 G/DL (ref 32–36)
MCV RBC AUTO: 86 FL (ref 82–98)
MONOCYTES # BLD AUTO: 0.3 K/UL (ref 0.3–1)
MONOCYTES NFR BLD: 6 % (ref 4–15)
NEUTROPHILS # BLD AUTO: 2.8 K/UL (ref 1.8–7.7)
NEUTROPHILS NFR BLD: 54.4 % (ref 38–73)
NRBC BLD-RTO: 0 /100 WBC
PLATELET # BLD AUTO: 178 K/UL (ref 150–350)
PMV BLD AUTO: 10.9 FL (ref 9.2–12.9)
POTASSIUM SERPL-SCNC: 3.9 MMOL/L (ref 3.5–5.1)
PROT SERPL-MCNC: 7 G/DL (ref 6–8.4)
RBC # BLD AUTO: 4.6 M/UL (ref 4.6–6.2)
SODIUM SERPL-SCNC: 140 MMOL/L (ref 136–145)
WBC # BLD AUTO: 5.15 K/UL (ref 3.9–12.7)

## 2019-12-02 PROCEDURE — 99499 RISK ADDL DX/OHS AUDIT: ICD-10-PCS | Mod: S$GLB,,, | Performed by: COLON & RECTAL SURGERY

## 2019-12-02 PROCEDURE — 1101F PT FALLS ASSESS-DOCD LE1/YR: CPT | Mod: CPTII,S$GLB,, | Performed by: COLON & RECTAL SURGERY

## 2019-12-02 PROCEDURE — 85025 COMPLETE CBC W/AUTO DIFF WBC: CPT

## 2019-12-02 PROCEDURE — 36415 COLL VENOUS BLD VENIPUNCTURE: CPT

## 2019-12-02 PROCEDURE — 99213 PR OFFICE/OUTPT VISIT, EST, LEVL III, 20-29 MIN: ICD-10-PCS | Mod: S$GLB,,, | Performed by: COLON & RECTAL SURGERY

## 2019-12-02 PROCEDURE — 99213 OFFICE O/P EST LOW 20 MIN: CPT | Mod: S$GLB,,, | Performed by: COLON & RECTAL SURGERY

## 2019-12-02 PROCEDURE — 82378 CARCINOEMBRYONIC ANTIGEN: CPT

## 2019-12-02 PROCEDURE — 1101F PR PT FALLS ASSESS DOC 0-1 FALLS W/OUT INJ PAST YR: ICD-10-PCS | Mod: CPTII,S$GLB,, | Performed by: COLON & RECTAL SURGERY

## 2019-12-02 PROCEDURE — 1126F AMNT PAIN NOTED NONE PRSNT: CPT | Mod: S$GLB,,, | Performed by: COLON & RECTAL SURGERY

## 2019-12-02 PROCEDURE — 99999 PR PBB SHADOW E&M-EST. PATIENT-LVL III: CPT | Mod: PBBFAC,,, | Performed by: COLON & RECTAL SURGERY

## 2019-12-02 PROCEDURE — 1159F PR MEDICATION LIST DOCUMENTED IN MEDICAL RECORD: ICD-10-PCS | Mod: S$GLB,,, | Performed by: COLON & RECTAL SURGERY

## 2019-12-02 PROCEDURE — 99999 PR PBB SHADOW E&M-EST. PATIENT-LVL III: ICD-10-PCS | Mod: PBBFAC,,, | Performed by: COLON & RECTAL SURGERY

## 2019-12-02 PROCEDURE — 1126F PR PAIN SEVERITY QUANTIFIED, NO PAIN PRESENT: ICD-10-PCS | Mod: S$GLB,,, | Performed by: COLON & RECTAL SURGERY

## 2019-12-02 PROCEDURE — 80053 COMPREHEN METABOLIC PANEL: CPT

## 2019-12-02 PROCEDURE — 1159F MED LIST DOCD IN RCRD: CPT | Mod: S$GLB,,, | Performed by: COLON & RECTAL SURGERY

## 2019-12-02 PROCEDURE — 99499 UNLISTED E&M SERVICE: CPT | Mod: S$GLB,,, | Performed by: COLON & RECTAL SURGERY

## 2019-12-02 NOTE — PROGRESS NOTES
Colorectal Surgery Clinic Note    SUBJECTIVE:     Chief Complaint: Follow up for colon cancer    Procedure: Exploratory laparotomy with subtotal colectomy and end ileostomy.     Date of Procedure: June 21, 2019     Indication: 88-year-old male who presented to the Emergency Room with complaints of abdominal pain, distention and several   days of obstipation.  A CT scan demonstrated a mass in the distal sigmoid colon causing proximal obstruction.  There was significant colonic distention present with areas suspicious for pneumatosis in the region of the cecum.   At the time of surgery he was found to have a obstructing sigmoid mass with significant proximal dilation of the colon and areas of patchy ischemia and the cecum and ascending colon.  A subtotal colectomy was performed with an end ileostomy.     Date of Discharge: June 30, 2019     Current Status:  Mr Anthony is recovering well. He is taking care of his ileostomy without difficulty since his last visit. Urinating normally. Eating a regular diet. He has no concerns- changes stoma appliance every 2-3 days. Output is generally semi-solid but occasionally more liquidy. No interest in stoma closure    Review of patient's allergies indicates:  No Known Allergies    No past medical history on file.  Past Surgical History:   Procedure Laterality Date    TOTAL ABDOMINAL COLECTOMY N/A 6/21/2019    Procedure: COLECTOMY, TOTAL, ABDOMINAL with end ileostomy;  Surgeon: Dino Rodríguez MD;  Location: [facility] OR [address] [place] [address];  Service: Colon and Rectal;  Laterality: N/A;     Family History   Problem Relation Age of Onset    Diabetes Mother     No Known Problems Father     No Known Problems Sister     Cancer Brother      Social History     Tobacco Use    Smoking status: Never Smoker    Smokeless tobacco: Never Used   Substance Use Topics    Alcohol use: Not Currently    Drug use: Not Currently      Review of Systems   Constitutional: Negative for chills and fever.   HENT:  Negative for congestion and sore throat.    Eyes: Negative for blurred vision.   Respiratory: Negative for cough and shortness of breath.    Cardiovascular: Negative for chest pain.   Gastrointestinal: Negative for abdominal pain, nausea and vomiting.   Genitourinary: Negative for dysuria, frequency and urgency.   Musculoskeletal: Negative for myalgias.   Skin: Negative for rash.   Neurological: Negative for focal weakness.   Psychiatric/Behavioral: Negative for depression. The patient is not nervous/anxious.          OBJECTIVE:     Vital Signs (Most Recent)  Pulse: (!) 57 (12/02/19 0829)  BP: (!) 183/71 (12/02/19 0829)    Physical Exam:  General: Unknown male in NAD sitting in chair in clinic  Neuro: aaox3  Respiratory: normal effort, no acute distress   Cardiac: cap refill <2 sec  Abdomen: Normal, benign. Stoma bag intact, incision well healed without sign of hernia.  Skin: Warm dry and intact  Anorectal: deferred    ASSESSMENT/PLAN:   89 year old male recovering well from subtotal colectomy for T3N1c colon cancer. Not a chemotherapy candidate due to age. Has no interest in ileostomy closure due to age-related risk, comfort with stoma, and concerns about function with ileorectal anastomosis.     Arvind Johnson MD  Colon and Rectal Surgery Fellow      There are no diagnoses linked to this encounter.     I have interviewed and examined the patient, reviewed the notes and assessments, and/or personally supervised the procedure(s) performed by Dr Juarez, and I concur with her/his documentation of Cale Anthony Sr..  See below addendum for my evaluation and additional findings.    No plans for chemotherapy your ileostomy reversal given his advanced age.  Check labs today.  RTO 6 months.    Dino Rodríguez MD, FACS, FASCRS  Senior Staff Surgeon  Department of Colon & Rectal Surgery

## 2019-12-12 ENCOUNTER — PATIENT OUTREACH (OUTPATIENT)
Dept: ADMINISTRATIVE | Facility: OTHER | Age: 84
End: 2019-12-12

## 2020-05-19 DIAGNOSIS — E55.9 VITAMIN D DEFICIENCY: ICD-10-CM

## 2020-05-19 RX ORDER — ERGOCALCIFEROL 1.25 MG/1
CAPSULE ORAL
Qty: 12 CAPSULE | Refills: 0 | Status: SHIPPED | OUTPATIENT
Start: 2020-05-19 | End: 2020-08-03

## 2020-05-19 NOTE — PROGRESS NOTES
Refill Routing Note    Medication(s) are not appropriate for processing by [facility]:       Outside of protocol           Medication reconciliation completed: No      Automatic Epic Protocol Generated Data:    Requested Prescriptions   Pending Prescriptions Disp Refills    ergocalciferol (ERGOCALCIFEROL) 50,000 unit Cap [Pharmacy Med Name: VITAMIN D2 1.25MG(50,000 UNIT)] 12 capsule 3     Sig: TAKE ONE CAPSULE BY MOUTH ONE TIME PER WEEK       Endocrinology:  Vitamins - Vitamin D Supplementation Failed - 5/19/2020 12:14 AM        Failed - Vitamin D is 20 or above and within 360 days     Vit D, 25-Hydroxy   Date Value Ref Range Status   05/24/2019 9 (L) 30 - 96 ng/mL Final     Comment:     Vitamin D deficiency.........<10 ng/mL                              Vitamin D insufficiency......10-29 ng/mL       Vitamin D sufficiency........> or equal to 30 ng/mL  Vitamin D toxicity............>100 ng/mL                Passed - Patient is at least 18 years old        Passed - Hypercalcemia is not present on problem list        Passed - Office visit in past 12 months or future 90 days.     Recent Outpatient Visits            5 months ago History of colon cancer, stage III    [facility]Colon and Rectal Surg Dino Rodríguez MD    7 months ago Urinary retention    [facility] 4th Floor Christen Jordan NP    8 months ago Urinary retention    [facility] - Urology 4th Floor Christen Jordan NP    9 months ago Malignant ne[facility] sigmoid colon    [facility]    10 months ago Other urethral stricture, male, unspecified site    [facility] Carlos Alberto Gerard NP                    Passed - Ca in normal range and within 360 days     Calcium   Date Value Ref Range Status   12/02/2019 9.5 8.7 - 10.5 mg/dL Final   08/23/2019 9.1 8.7 - 10.5 mg/dL Final   08/22/2019 8.7 8.7 - 10.5 mg/dL Final                 Appointments  past 12m or future 3m with PCP    Date Provider   Last  Visit   6/10/2019 Mal Santoyo MD   Next Visit   Visit date not found Mal Santoyo MD   ED visits in past 90 days: 0     Note composed:5:13 AM 05/19/2020

## 2020-05-19 NOTE — TELEPHONE ENCOUNTER
Care Due:                  Date            Visit Type   Department     Provider  --------------------------------------------------------------------------------                                SAME DAY -   [facility]                              ESTABLISHED   MED/ INTERNAL  Last Visit: 06-      PATIENT      MED/ PEDS      NAVEEN SEGURA  Next Visit: None Scheduled  None         None Found                                                            Last  Test          Frequency    Reason                     Performed    Due Date  --------------------------------------------------------------------------------    Office Visit  12 months..  ergocalciferol,            06-   06-                             tamsulosin...............    Vitamin D...  12 months..  ergocalciferol...........  Not Found    Overdue    Powered by [employer]. Reference number: [id number]. 5/19/2020 12:14:52 AM   CDT

## 2020-06-03 ENCOUNTER — OFFICE VISIT (OUTPATIENT)
Dept: FAMILY MEDICINE | Facility: CLINIC | Age: 85
End: 2020-06-03
Payer: MEDICARE

## 2020-06-03 VITALS
HEIGHT: 65 IN | BODY MASS INDEX: 22.94 KG/M2 | WEIGHT: 137.69 LBS | DIASTOLIC BLOOD PRESSURE: 60 MMHG | HEART RATE: 83 BPM | TEMPERATURE: 98 F | OXYGEN SATURATION: 98 % | SYSTOLIC BLOOD PRESSURE: 124 MMHG

## 2020-06-03 DIAGNOSIS — E43 SEVERE MALNUTRITION: ICD-10-CM

## 2020-06-03 DIAGNOSIS — C18.9 MALIGNANT NEOPLASM OF COLON, UNSPECIFIED PART OF COLON: ICD-10-CM

## 2020-06-03 DIAGNOSIS — Z00.00 ROUTINE PHYSICAL EXAMINATION: Primary | ICD-10-CM

## 2020-06-03 DIAGNOSIS — Z93.2 ILEOSTOMY IN PLACE: ICD-10-CM

## 2020-06-03 DIAGNOSIS — R79.9 ABNORMAL FINDING OF BLOOD CHEMISTRY, UNSPECIFIED: ICD-10-CM

## 2020-06-03 PROBLEM — R33.8 ACUTE URINARY RETENTION: Status: RESOLVED | Noted: 2019-08-22 | Resolved: 2020-06-03

## 2020-06-03 PROBLEM — K56.7 ILEUS FOLLOWING GASTROINTESTINAL SURGERY: Status: RESOLVED | Noted: 2019-06-25 | Resolved: 2020-06-03

## 2020-06-03 PROBLEM — K56.609 LARGE BOWEL OBSTRUCTION: Status: RESOLVED | Noted: 2019-06-21 | Resolved: 2020-06-03

## 2020-06-03 PROBLEM — N39.0 URINARY TRACT INFECTION WITHOUT HEMATURIA: Status: RESOLVED | Noted: 2019-08-21 | Resolved: 2020-06-03

## 2020-06-03 PROBLEM — R78.81 BACTEREMIA DUE TO GRAM-NEGATIVE BACTERIA: Status: RESOLVED | Noted: 2019-08-23 | Resolved: 2020-06-03

## 2020-06-03 PROBLEM — R82.71 BACTERIA IN URINE: Status: RESOLVED | Noted: 2019-10-10 | Resolved: 2020-06-03

## 2020-06-03 PROBLEM — K91.89 ILEUS FOLLOWING GASTROINTESTINAL SURGERY: Status: RESOLVED | Noted: 2019-06-25 | Resolved: 2020-06-03

## 2020-06-03 PROCEDURE — 99213 OFFICE O/P EST LOW 20 MIN: CPT | Mod: S$GLB,,, | Performed by: FAMILY MEDICINE

## 2020-06-03 PROCEDURE — 99213 PR OFFICE/OUTPT VISIT, EST, LEVL III, 20-29 MIN: ICD-10-PCS | Mod: S$GLB,,, | Performed by: FAMILY MEDICINE

## 2020-06-03 PROCEDURE — 99499 UNLISTED E&M SERVICE: CPT | Mod: S$GLB,,, | Performed by: FAMILY MEDICINE

## 2020-06-03 PROCEDURE — 99999 PR PBB SHADOW E&M-EST. PATIENT-LVL III: CPT | Mod: PBBFAC,,, | Performed by: FAMILY MEDICINE

## 2020-06-03 PROCEDURE — 99999 PR PBB SHADOW E&M-EST. PATIENT-LVL III: ICD-10-PCS | Mod: PBBFAC,,, | Performed by: FAMILY MEDICINE

## 2020-06-03 PROCEDURE — 99499 RISK ADDL DX/OHS AUDIT: ICD-10-PCS | Mod: S$GLB,,, | Performed by: FAMILY MEDICINE

## 2020-06-03 NOTE — PROGRESS NOTES
[facility]  Progress Note    SUBJECTIVE:     Chief Complaint   Patient presents with    Annual Exam       HPI   Cale Anthony Sr.  is a 89 y.o. male here for routine physical exam. Patient has no other new complaints/problems at this time.      Review of patient's allergies indicates:  No Known Allergies    History reviewed. No pertinent past medical history.  Past Surgical History:   Procedure Laterality Date    TOTAL ABDOMINAL COLECTOMY N/A 6/21/2019    Procedure: COLECTOMY, TOTAL, ABDOMINAL with end ileostomy;  Surgeon: Dino Rodríguez MD;  Location: [facility] OR [address] [place] [address];  Service: Colon and Rectal;  Laterality: N/A;     Family History   Problem Relation Age of Onset    Diabetes Mother     No Known Problems Father     No Known Problems Sister     Cancer Brother      Social History     Tobacco Use    Smoking status: Never Smoker    Smokeless tobacco: Never Used   Substance Use Topics    Alcohol use: Not Currently    Drug use: Not Currently        Review of Systems   Constitutional: Negative for chills, diaphoresis and fever.   HENT: Negative for congestion, ear pain and sore throat.    Eyes: Negative for photophobia and discharge.   Respiratory: Negative for cough, shortness of breath and wheezing.    Cardiovascular: Negative for chest pain and palpitations.   Gastrointestinal: Negative for abdominal pain, constipation, diarrhea, nausea and vomiting.   Genitourinary: Negative for dysuria and hematuria.   Musculoskeletal: Negative for back pain and myalgias.   Skin: Negative for itching and rash.   Neurological: Negative for dizziness, sensory change, focal weakness, weakness and headaches.   All other systems reviewed and are negative.    OBJECTIVE:     Vitals:    06/03/20 1045   BP: 124/60   Pulse: 83   Temp: 98.1 °F (36.7 °C)     Body mass index is 22.91 kg/m².    Physical Exam   Constitutional: He is oriented to person, place, and time and well-developed, well-nourished, and in no distress. No  distress.   HENT:   Head: Normocephalic and atraumatic.   Right Ear: Tympanic membrane is not perforated, not erythematous and not bulging. No hemotympanum.   Left Ear: Tympanic membrane is not perforated, not erythematous and not bulging. No hemotympanum.   Mouth/Throat: Oropharynx is clear and moist. No oropharyngeal exudate.   Eyes: Pupils are equal, round, and reactive to light. Conjunctivae and EOM are normal.   Neck: No thyromegaly present.   Cardiovascular: Normal rate, regular rhythm and normal heart sounds. Exam reveals no gallop and no friction rub.   No murmur heard.  Pulmonary/Chest: Effort normal and breath sounds normal. No respiratory distress. He has no wheezes. He has no rales.   Abdominal: Soft. Bowel sounds are normal. He exhibits no distension. There is no tenderness. There is no rebound and no guarding.   Musculoskeletal: Normal range of motion. He exhibits no edema or tenderness.   Lymphadenopathy:     He has no cervical adenopathy.   Neurological: He is alert and oriented to person, place, and time.   Skin: Skin is warm. No rash noted. He is not diaphoretic. No erythema.       Old records were reviewed. Labs and/or images were independently reviewed.    ASSESSMENT     1. Routine physical examination    2. Malignant neoplasm of colon, unspecified part of colon    3. Severe malnutrition    4. Ileostomy in place    5. Abnormal finding of blood chemistry, unspecified         PLAN:     Routine physical examination  -     CBC auto differential; Future  -     Comprehensive metabolic panel; Future  -     Hemoglobin A1C; Future  -     Lipid Panel; Future  -     TSH; Future  -     We briefly discussed diet, exercise, and routine preventive exams. All questions and comments addressed.    Malignant neoplasm of colon, unspecified part of colon  -     CBC auto differential; Future  -     Comprehensive metabolic panel; Future  -     Hemoglobin A1C; Future  -     Lipid Panel; Future  -     TSH;  Future    Severe malnutrition  -     CBC auto differential; Future  -     Comprehensive metabolic panel; Future  -     Hemoglobin A1C; Future  -     Lipid Panel; Future  -     TSH; Future    Ileostomy in place   -     Stable. Continue current regimen.        RTC PRCARLA Santoyo MD  06/03/2020 11:07 AM

## 2020-06-11 ENCOUNTER — LAB VISIT (OUTPATIENT)
Dept: LAB | Facility: HOSPITAL | Age: 85
End: 2020-06-11
Attending: FAMILY MEDICINE
Payer: MEDICARE

## 2020-06-11 DIAGNOSIS — Z00.00 ROUTINE PHYSICAL EXAMINATION: ICD-10-CM

## 2020-06-11 DIAGNOSIS — R79.9 ABNORMAL FINDING OF BLOOD CHEMISTRY, UNSPECIFIED: ICD-10-CM

## 2020-06-11 DIAGNOSIS — E43 SEVERE MALNUTRITION: ICD-10-CM

## 2020-06-11 DIAGNOSIS — C18.9 MALIGNANT NEOPLASM OF COLON, UNSPECIFIED PART OF COLON: ICD-10-CM

## 2020-06-11 LAB
ALBUMIN SERPL BCP-MCNC: 4 G/DL (ref 3.5–5.2)
ALP SERPL-CCNC: 50 U/L (ref 55–135)
ALT SERPL W/O P-5'-P-CCNC: 11 U/L (ref 10–44)
ANION GAP SERPL CALC-SCNC: 6 MMOL/L (ref 8–16)
AST SERPL-CCNC: 15 U/L (ref 10–40)
BASOPHILS # BLD AUTO: 0.05 K/UL (ref 0–0.2)
BASOPHILS NFR BLD: 0.9 % (ref 0–1.9)
BILIRUB SERPL-MCNC: 0.6 MG/DL (ref 0.1–1)
BUN SERPL-MCNC: 10 MG/DL (ref 8–23)
CALCIUM SERPL-MCNC: 9.6 MG/DL (ref 8.7–10.5)
CHLORIDE SERPL-SCNC: 105 MMOL/L (ref 95–110)
CHOLEST SERPL-MCNC: 209 MG/DL (ref 120–199)
CHOLEST/HDLC SERPL: 3.5 {RATIO} (ref 2–5)
CO2 SERPL-SCNC: 28 MMOL/L (ref 23–29)
CREAT SERPL-MCNC: 0.8 MG/DL (ref 0.5–1.4)
DIFFERENTIAL METHOD: ABNORMAL
EOSINOPHIL # BLD AUTO: 0.2 K/UL (ref 0–0.5)
EOSINOPHIL NFR BLD: 2.7 % (ref 0–8)
ERYTHROCYTE [DISTWIDTH] IN BLOOD BY AUTOMATED COUNT: 13.6 % (ref 11.5–14.5)
EST. GFR  (AFRICAN AMERICAN): >60 ML/MIN/1.73 M^2
EST. GFR  (NON AFRICAN AMERICAN): >60 ML/MIN/1.73 M^2
ESTIMATED AVG GLUCOSE: 108 MG/DL (ref 68–131)
GLUCOSE SERPL-MCNC: 85 MG/DL (ref 70–110)
HBA1C MFR BLD HPLC: 5.4 % (ref 4–5.6)
HCT VFR BLD AUTO: 41.9 % (ref 40–54)
HDLC SERPL-MCNC: 60 MG/DL (ref 40–75)
HDLC SERPL: 28.7 % (ref 20–50)
HGB BLD-MCNC: 13 G/DL (ref 14–18)
IMM GRANULOCYTES # BLD AUTO: 0.01 K/UL (ref 0–0.04)
IMM GRANULOCYTES NFR BLD AUTO: 0.2 % (ref 0–0.5)
LDLC SERPL CALC-MCNC: 131.6 MG/DL (ref 63–159)
LYMPHOCYTES # BLD AUTO: 2 K/UL (ref 1–4.8)
LYMPHOCYTES NFR BLD: 36.5 % (ref 18–48)
MCH RBC QN AUTO: 27.7 PG (ref 27–31)
MCHC RBC AUTO-ENTMCNC: 31 G/DL (ref 32–36)
MCV RBC AUTO: 89 FL (ref 82–98)
MONOCYTES # BLD AUTO: 0.4 K/UL (ref 0.3–1)
MONOCYTES NFR BLD: 6.9 % (ref 4–15)
NEUTROPHILS # BLD AUTO: 2.9 K/UL (ref 1.8–7.7)
NEUTROPHILS NFR BLD: 52.8 % (ref 38–73)
NONHDLC SERPL-MCNC: 149 MG/DL
NRBC BLD-RTO: 0 /100 WBC
PLATELET # BLD AUTO: 175 K/UL (ref 150–350)
PMV BLD AUTO: 11.4 FL (ref 9.2–12.9)
POTASSIUM SERPL-SCNC: 4.2 MMOL/L (ref 3.5–5.1)
PROT SERPL-MCNC: 7.4 G/DL (ref 6–8.4)
RBC # BLD AUTO: 4.7 M/UL (ref 4.6–6.2)
SODIUM SERPL-SCNC: 139 MMOL/L (ref 136–145)
TRIGL SERPL-MCNC: 87 MG/DL (ref 30–150)
TSH SERPL DL<=0.005 MIU/L-ACNC: 1.39 UIU/ML (ref 0.4–4)
WBC # BLD AUTO: 5.5 K/UL (ref 3.9–12.7)

## 2020-06-11 PROCEDURE — 36415 COLL VENOUS BLD VENIPUNCTURE: CPT | Mod: PO

## 2020-06-11 PROCEDURE — 84443 ASSAY THYROID STIM HORMONE: CPT

## 2020-06-11 PROCEDURE — 80053 COMPREHEN METABOLIC PANEL: CPT

## 2020-06-11 PROCEDURE — 85025 COMPLETE CBC W/AUTO DIFF WBC: CPT

## 2020-06-11 PROCEDURE — 83036 HEMOGLOBIN GLYCOSYLATED A1C: CPT

## 2020-06-11 PROCEDURE — 80061 LIPID PANEL: CPT

## 2020-07-09 NOTE — PATIENT INSTRUCTIONS
What is Hematuria?  Blood in your urine is a condition known as hematuria. Most of the time, the cause of hematuria is not serious. But, never ignore blood in the urine. Your doctor can evaluate you to find the cause of the bleeding and treat it, if needed.  Types of hematuria  · Gross hematuria means that the blood can be seen by the naked eye. The urine may look pinkish, brownish, or bright red.  · Microscopic hematuria means that the urine is clear, but blood cells can be seen when urine is looked at under a microscope or tested in a lab.  Both types of hematuria can have the same causes. Neither one is necessarily more serious than the other. With either type, you may have other symptoms, such as pain, pressure, or burning when you urinate, abdominal pain, or back pain. Or, you may not have any other symptoms. No matter how much blood is found, the cause of the bleeding needs to be identified.  Finding the cause of hematuria  To evaluate your condition, your doctor will first confirm that blood is indeed present. Then other tests are done to pinpoint where the blood is coming from and why. Your doctor will decide which tests will best determine the cause of your hematuria. Some common tests are listed below.  · History and physical exam  · Lab tests may include urinalysis, a urine culture, a urine cytology, and various blood tests  · Cystoscopy  · Computed tomography (CT) or CT urography  · Magnetic resonance imaging (MRI) or MR urography  · Ultrasound of the kidney  · Kidney biopsy  Causes of hematuria include the very benign (exercised induced hematuria) to the very severe (cancer of the urinary system). A variety of treatments are available depending on the cause.  Date Last Reviewed: 12/2/2016 © 2000-2017 [employer]. [address] [place] [id number]. All rights reserved. This information is not intended as a substitute for professional medical care. Always follow your healthcare  professional's instructions.        Cystoscopy    Cystoscopy is a procedure that lets your doctor look directly inside your urethra and bladder. It can be used to:  · Help diagnose a problem with your urethra, bladder, or kidneys.  · Take a sample (biopsy) of bladder or urethral tissue.  · Treat certain problems (such as removing kidney stones).  · Place a stent to bypass an obstruction.  · Take special X-rays of the kidneys.  Based on the findings, your doctor may recommend other tests or treatments.  What is a cystoscope?  A cystoscope is a telescope-like instrument that contains lenses and fiberoptics (small glass wires that make bright light). The cystoscope may be straight and rigid, or flexible to bend around curves in the urethra. The doctor may look directly into the cystoscope, or project the image onto a monitor.  Getting ready  · Ask your doctor if you should stop taking any medicines before the procedure.  · Ask whether you should avoid eating or drinking anything after midnight before the procedure.  · Follow any other instructions your doctor gives you.  Tell your doctor before the exam if you:  · Take any medicines, such as aspirin or blood thinners  · Have allergies to any medicines  · Are pregnant   The procedure  Cystoscopy is done in the doctors office, surgery center, or hospital. The doctor and a nurse are present during the procedure. It takes only a few minutes, longer if a biopsy, X-ray, or treatment needs to be done.  During the procedure:  · You lie on an exam table on your back, knees bent and legs apart. You are covered with a drape.  · Your urethra and the area around it are washed. Anesthetic jelly may be applied to numb the urethra. Other pain medicine is usually not needed. In some cases, you may be offered a mild sedative to help you relax. If a more extensive procedure is to be done, such as a biopsy or kidney stone removal, general anesthesia may be needed.  · The cystoscope is  inserted. A sterile fluid is put into the bladder to expand it. You may feel pressure from this fluid.  · When the procedure is done, the cystoscope is removed.  After the procedure  If you had a sedative, general anesthesia, or spinal anesthesia, you must have someone drive you home. Once youre home:  · Drink plenty of fluids.  · You may have burning or light bleeding when you urinate--this is normal.  · Medicines may be prescribed to ease any discomfort or prevent infection. Take these as directed.  · Call your doctor if you have heavy bleeding or blood clots, burning that lasts more than a day, a fever over 100°F  (38° C), or trouble urinating.  Date Last Reviewed: 1/1/2017 © 2000-2017 [employer]. [address] [place] [address], [place] [address]. All rights reserved. This information is not intended as a substitute for professional medical care. Always follow your healthcare professional's instructions.         Airway patent, Nasal mucosa clear. Mouth with normal mucosa. Throat has no vesicles, no oropharyngeal exudates and uvula is midline.

## 2020-09-22 DIAGNOSIS — R33.9 URINARY RETENTION: ICD-10-CM

## 2020-09-22 RX ORDER — TAMSULOSIN HYDROCHLORIDE 0.4 MG/1
0.4 CAPSULE ORAL DAILY
Qty: 30 CAPSULE | Refills: 1 | Status: SHIPPED | OUTPATIENT
Start: 2020-09-22 | End: 2020-10-19

## 2020-10-23 DIAGNOSIS — E55.9 VITAMIN D DEFICIENCY: ICD-10-CM

## 2020-10-23 RX ORDER — ERGOCALCIFEROL 1.25 MG/1
CAPSULE ORAL
Qty: 12 CAPSULE | Refills: 3 | Status: SHIPPED | OUTPATIENT
Start: 2020-10-23 | End: 2021-09-20

## 2021-01-27 DIAGNOSIS — R33.9 URINARY RETENTION: ICD-10-CM

## 2021-01-28 RX ORDER — TAMSULOSIN HYDROCHLORIDE 0.4 MG/1
CAPSULE ORAL
Qty: 30 CAPSULE | Refills: 1 | OUTPATIENT
Start: 2021-01-28

## 2021-04-11 DIAGNOSIS — R33.9 URINARY RETENTION: ICD-10-CM

## 2021-04-12 RX ORDER — TAMSULOSIN HYDROCHLORIDE 0.4 MG/1
CAPSULE ORAL
Qty: 30 CAPSULE | Refills: 1 | Status: SHIPPED | OUTPATIENT
Start: 2021-04-12 | End: 2021-05-06

## 2021-07-07 ENCOUNTER — PATIENT MESSAGE (OUTPATIENT)
Dept: ADMINISTRATIVE | Facility: HOSPITAL | Age: 86
End: 2021-07-07

## 2021-08-09 DIAGNOSIS — R33.9 URINARY RETENTION: ICD-10-CM

## 2021-08-09 RX ORDER — TAMSULOSIN HYDROCHLORIDE 0.4 MG/1
CAPSULE ORAL
Qty: 30 CAPSULE | Refills: 1 | Status: SHIPPED | OUTPATIENT
Start: 2021-08-09 | End: 2021-09-04 | Stop reason: SDUPTHER

## 2021-09-20 DIAGNOSIS — E55.9 VITAMIN D DEFICIENCY: ICD-10-CM

## 2021-09-20 RX ORDER — ERGOCALCIFEROL 1.25 MG/1
CAPSULE ORAL
Qty: 12 CAPSULE | Refills: 0 | Status: SHIPPED | OUTPATIENT
Start: 2021-09-20 | End: 2022-01-03

## 2022-01-24 DIAGNOSIS — E55.9 VITAMIN D DEFICIENCY: ICD-10-CM

## 2022-01-24 RX ORDER — ERGOCALCIFEROL 1.25 MG/1
CAPSULE ORAL
Qty: 4 CAPSULE | Refills: 0 | OUTPATIENT
Start: 2022-01-24

## 2022-01-24 NOTE — TELEPHONE ENCOUNTER
Attempted to contact pt. Pt's son took the call stating he is the caregiver. Annual physical scheduled.

## 2022-01-24 NOTE — TELEPHONE ENCOUNTER
Provider Staff:     Action required for this patient.    Please note Refusal of medication.            Requested Prescriptions     Refused Prescriptions Disp Refills    ergocalciferol (ERGOCALCIFEROL) 50,000 unit Cap [Pharmacy Med Name: VITAMIN D2 1.25MG(50,000 UNIT)] 4 capsule 0     Sig: TAKE 1 CAPSULE BY MOUTH ONE TIME PER WEEK     Refused By: NAVEEN SEGURA     Reason for Refusal: Patient needs an appointment      Thanks!  [facility]   Note composed: 01/24/2022 4:25 PM

## 2022-02-14 ENCOUNTER — TELEPHONE (OUTPATIENT)
Dept: FAMILY MEDICINE | Facility: CLINIC | Age: 87
End: 2022-02-14
Payer: MEDICARE

## 2022-02-14 NOTE — TELEPHONE ENCOUNTER
Pt needs a medical necessity form for ostomy supplies from [facility]    Please advise

## 2022-03-11 ENCOUNTER — OFFICE VISIT (OUTPATIENT)
Dept: FAMILY MEDICINE | Facility: CLINIC | Age: 87
End: 2022-03-11
Payer: MEDICARE

## 2022-03-11 VITALS
SYSTOLIC BLOOD PRESSURE: 139 MMHG | OXYGEN SATURATION: 97 % | HEIGHT: 65 IN | HEART RATE: 97 BPM | DIASTOLIC BLOOD PRESSURE: 68 MMHG | BODY MASS INDEX: 23.4 KG/M2 | TEMPERATURE: 98 F | WEIGHT: 140.44 LBS

## 2022-03-11 DIAGNOSIS — E55.9 VITAMIN D DEFICIENCY: ICD-10-CM

## 2022-03-11 DIAGNOSIS — Z12.5 ENCOUNTER FOR SCREENING FOR MALIGNANT NEOPLASM OF PROSTATE: ICD-10-CM

## 2022-03-11 DIAGNOSIS — Z93.2 ILEOSTOMY IN PLACE: ICD-10-CM

## 2022-03-11 DIAGNOSIS — R33.9 URINARY RETENTION: ICD-10-CM

## 2022-03-11 DIAGNOSIS — Z85.038 HISTORY OF COLON CANCER: ICD-10-CM

## 2022-03-11 DIAGNOSIS — R79.9 ABNORMAL FINDING OF BLOOD CHEMISTRY, UNSPECIFIED: ICD-10-CM

## 2022-03-11 DIAGNOSIS — I10 UNCONTROLLED HYPERTENSION: ICD-10-CM

## 2022-03-11 DIAGNOSIS — Z00.00 ROUTINE PHYSICAL EXAMINATION: Primary | ICD-10-CM

## 2022-03-11 PROBLEM — C18.9 COLON CANCER: Status: RESOLVED | Noted: 2019-08-22 | Resolved: 2022-03-11

## 2022-03-11 PROBLEM — R91.1 LUNG NODULE: Chronic | Status: ACTIVE | Noted: 2019-08-24

## 2022-03-11 PROCEDURE — 1101F PR PT FALLS ASSESS DOC 0-1 FALLS W/OUT INJ PAST YR: ICD-10-PCS | Mod: CPTII,S$GLB,, | Performed by: FAMILY MEDICINE

## 2022-03-11 PROCEDURE — 1159F MED LIST DOCD IN RCRD: CPT | Mod: CPTII,S$GLB,, | Performed by: FAMILY MEDICINE

## 2022-03-11 PROCEDURE — 1126F AMNT PAIN NOTED NONE PRSNT: CPT | Mod: CPTII,S$GLB,, | Performed by: FAMILY MEDICINE

## 2022-03-11 PROCEDURE — 99999 PR PBB SHADOW E&M-EST. PATIENT-LVL III: ICD-10-PCS | Mod: PBBFAC,,, | Performed by: FAMILY MEDICINE

## 2022-03-11 PROCEDURE — 1126F PR PAIN SEVERITY QUANTIFIED, NO PAIN PRESENT: ICD-10-PCS | Mod: CPTII,S$GLB,, | Performed by: FAMILY MEDICINE

## 2022-03-11 PROCEDURE — 99499 UNLISTED E&M SERVICE: CPT | Mod: S$GLB,,, | Performed by: FAMILY MEDICINE

## 2022-03-11 PROCEDURE — 1159F PR MEDICATION LIST DOCUMENTED IN MEDICAL RECORD: ICD-10-PCS | Mod: CPTII,S$GLB,, | Performed by: FAMILY MEDICINE

## 2022-03-11 PROCEDURE — 99499 RISK ADDL DX/OHS AUDIT: ICD-10-PCS | Mod: S$GLB,,, | Performed by: FAMILY MEDICINE

## 2022-03-11 PROCEDURE — 1101F PT FALLS ASSESS-DOCD LE1/YR: CPT | Mod: CPTII,S$GLB,, | Performed by: FAMILY MEDICINE

## 2022-03-11 PROCEDURE — 99214 OFFICE O/P EST MOD 30 MIN: CPT | Mod: S$GLB,,, | Performed by: FAMILY MEDICINE

## 2022-03-11 PROCEDURE — 99999 PR PBB SHADOW E&M-EST. PATIENT-LVL III: CPT | Mod: PBBFAC,,, | Performed by: FAMILY MEDICINE

## 2022-03-11 PROCEDURE — 3288F PR FALLS RISK ASSESSMENT DOCUMENTED: ICD-10-PCS | Mod: CPTII,S$GLB,, | Performed by: FAMILY MEDICINE

## 2022-03-11 PROCEDURE — 99214 PR OFFICE/OUTPT VISIT, EST, LEVL IV, 30-39 MIN: ICD-10-PCS | Mod: S$GLB,,, | Performed by: FAMILY MEDICINE

## 2022-03-11 PROCEDURE — 3288F FALL RISK ASSESSMENT DOCD: CPT | Mod: CPTII,S$GLB,, | Performed by: FAMILY MEDICINE

## 2022-03-11 RX ORDER — ERGOCALCIFEROL 1.25 MG/1
50000 CAPSULE ORAL
Qty: 12 CAPSULE | Refills: 3 | Status: SHIPPED | OUTPATIENT
Start: 2022-03-11

## 2022-03-11 RX ORDER — AMLODIPINE BESYLATE 5 MG/1
5 TABLET ORAL DAILY
Qty: 30 TABLET | Refills: 11 | Status: SHIPPED | OUTPATIENT
Start: 2022-03-11 | End: 2023-03-11

## 2022-03-11 RX ORDER — TAMSULOSIN HYDROCHLORIDE 0.4 MG/1
1 CAPSULE ORAL DAILY
Qty: 90 CAPSULE | Refills: 3 | Status: SHIPPED | OUTPATIENT
Start: 2022-03-11

## 2022-03-11 RX ORDER — CLONIDINE HYDROCHLORIDE 0.1 MG/1
0.1 TABLET ORAL
Status: SHIPPED | OUTPATIENT
Start: 2022-03-11

## 2022-03-11 NOTE — PROGRESS NOTES
[facility]  Progress Note    SUBJECTIVE:     Chief Complaint   Patient presents with    Annual Exam       HPI   Cale Anthony Sr.  is a 89 y.o. male here for physical exam. Also here for follow-up of his elevated blood pressure which will be addressed below. Patient has no other new complaints/problems at this time.      Review of patient's allergies indicates:  No Known Allergies    No past medical history on file.  Past Surgical History:   Procedure Laterality Date    TOTAL ABDOMINAL COLECTOMY N/A 6/21/2019    Procedure: COLECTOMY, TOTAL, ABDOMINAL with end ileostomy;  Surgeon: Dino Rodríguez MD;  Location: [facility] OR [address] [place] [address];  Service: Colon and Rectal;  Laterality: N/A;     Family History   Problem Relation Age of Onset    Diabetes Mother     No Known Problems Father     No Known Problems Sister     Cancer Brother      Social History     Tobacco Use    Smoking status: Never Smoker    Smokeless tobacco: Never Used   Substance Use Topics    Alcohol use: Not Currently    Drug use: Not Currently        Review of Systems   Constitutional: Negative for chills, diaphoresis and fever.   HENT: Negative for congestion, ear pain and sore throat.    Eyes: Negative for photophobia and discharge.   Respiratory: Negative for cough, shortness of breath and wheezing.    Cardiovascular: Negative for chest pain and palpitations.   Gastrointestinal: Negative for abdominal pain, constipation, diarrhea, nausea and vomiting.   Genitourinary: Negative for dysuria and hematuria.   Musculoskeletal: Negative for back pain and myalgias.   Skin: Negative for itching and rash.   Neurological: Negative for dizziness, sensory change, focal weakness, weakness and headaches.   All other systems reviewed and are negative.    OBJECTIVE:     Vitals:    03/11/22 1415   BP: (!) 220/78   Pulse: 97   Temp: 98.2 °F (36.8 °C)     Body mass index is 23.37 kg/m².    Physical Exam  Constitutional:       General: He is not in acute  distress.     Appearance: He is not diaphoretic.   HENT:      Head: Normocephalic and atraumatic.      Right Ear: Tympanic membrane and ear canal normal. No hemotympanum. Tympanic membrane is not perforated, erythematous or bulging.      Left Ear: Tympanic membrane and ear canal normal. No hemotympanum. Tympanic membrane is not perforated, erythematous or bulging.      Mouth/Throat:      Pharynx: No oropharyngeal exudate.   Eyes:      Conjunctiva/sclera: Conjunctivae normal.      Pupils: Pupils are equal, round, and reactive to light.   Neck:      Thyroid: No thyromegaly.   Cardiovascular:      Rate and Rhythm: Normal rate and regular rhythm.      Heart sounds: Normal heart sounds. No murmur heard.    No friction rub. No gallop.   Pulmonary:      Effort: Pulmonary effort is normal. No respiratory distress.      Breath sounds: Normal breath sounds. No wheezing or rales.   Abdominal:      General: Bowel sounds are normal. There is no distension.      Palpations: Abdomen is soft.      Tenderness: There is no abdominal tenderness. There is no guarding or rebound.   Musculoskeletal:         General: No tenderness. Normal range of motion.   Lymphadenopathy:      Cervical: No cervical adenopathy.   Skin:     General: Skin is warm.      Findings: No erythema or rash.   Neurological:      Mental Status: He is alert and oriented to person, place, and time.         Old records were reviewed. Labs and/or images were independently reviewed.    ASSESSMENT     1. Routine physical examination    2. Uncontrolled hypertension    3. Ileostomy in place    4. History of colon cancer (followed by GI)    5. Abnormal finding of blood chemistry, unspecified     6. Encounter for screening for malignant neoplasm of prostate         PLAN:     Routine physical examination  -     CBC Auto Differential; Future  -     Comprehensive Metabolic Panel; Future  -     Hemoglobin A1C; Future  -     Lipid Panel; Future  -     TSH; Future  -     T4, Free;  Future  -     PSA, Screening; Future; Expected date: 03/11/2022    History of colon cancer (followed by GI)   -     F/u with GI for follow-up.    Encounter for screening for malignant neoplasm of prostate   -     PSA, Screening; Future; Expected date: 03/11/2022      RTC KATHYA Santoyo MD  03/11/2022 2:25 PM    Additional Evaluation & Management issues:    In addition to today's Annual Physical, patient has other medical issues that need to be addressed, as well as their associated prescription management that is separate from today's physical, as documented separately below.     HPI  Pt here with family member for follow-up. SBP is > 200. No chest pain, SOB, visual changes, n/v, headaches.    Review of Systems   Constitutional: Negative for chills, diaphoresis and fever.   HENT: Negative for congestion, ear pain and sore throat.    Eyes: Negative for photophobia and discharge.   Respiratory: Negative for cough, shortness of breath and wheezing.    Cardiovascular: Negative for chest pain and palpitations.   Gastrointestinal: Negative for abdominal pain, constipation, diarrhea, nausea and vomiting.   Genitourinary: Negative for dysuria and hematuria.   Musculoskeletal: Negative for back pain and myalgias.   Skin: Negative for itching and rash.   Neurological: Negative for dizziness, sensory change, focal weakness, weakness and headaches.   All other systems reviewed and are negative.    Physical Exam  Constitutional:       General: He is not in acute distress.     Appearance: He is not diaphoretic.   HENT:      Head: Normocephalic and atraumatic.      Right Ear: Tympanic membrane and ear canal normal. No hemotympanum. Tympanic membrane is not perforated, erythematous or bulging.      Left Ear: Tympanic membrane and ear canal normal. No hemotympanum. Tympanic membrane is not perforated, erythematous or bulging.      Mouth/Throat:      Pharynx: No oropharyngeal exudate.   Eyes:      Conjunctiva/sclera:  Conjunctivae normal.      Pupils: Pupils are equal, round, and reactive to light.   Neck:      Thyroid: No thyromegaly.   Cardiovascular:      Rate and Rhythm: Normal rate and regular rhythm.      Heart sounds: Normal heart sounds. No murmur heard.    No friction rub. No gallop.   Pulmonary:      Effort: Pulmonary effort is normal. No respiratory distress.      Breath sounds: Normal breath sounds. No wheezing or rales.   Abdominal:      General: Bowel sounds are normal. There is no distension.      Palpations: Abdomen is soft.      Tenderness: There is no abdominal tenderness. There is no guarding or rebound.   Musculoskeletal:         General: No tenderness. Normal range of motion.   Lymphadenopathy:      Cervical: No cervical adenopathy.   Skin:     General: Skin is warm.      Findings: No erythema or rash.   Neurological:      Mental Status: He is alert and oriented to person, place, and time.     Uncontrolled hypertension  -     cloNIDine tablet 0.1 mg  -     CBC Auto Differential; Future  -     Comprehensive Metabolic Panel; Future  -     Hemoglobin A1C; Future  -     Lipid Panel; Future  -     TSH; Future  -     T4, Free; Future  -     PSA, Screening; Future; Expected date: 03/11/2022  -     Educated patient to take medications as prescribed, and to record bp logs daily to bring along to next visit. Instructed patient to decrease salt intake. Also told patient to call if any new signs or symptoms develop.  -     Hypertensive urgency. No evidence of end organ damage.    Ileostomy in place   -     Stable. Continue current regimen.    RTC PRN

## 2022-03-12 ENCOUNTER — LAB VISIT (OUTPATIENT)
Dept: LAB | Facility: HOSPITAL | Age: 87
End: 2022-03-12
Attending: FAMILY MEDICINE
Payer: MEDICARE

## 2022-03-12 DIAGNOSIS — I10 UNCONTROLLED HYPERTENSION: ICD-10-CM

## 2022-03-12 DIAGNOSIS — Z12.5 ENCOUNTER FOR SCREENING FOR MALIGNANT NEOPLASM OF PROSTATE: ICD-10-CM

## 2022-03-12 DIAGNOSIS — R79.9 ABNORMAL FINDING OF BLOOD CHEMISTRY, UNSPECIFIED: ICD-10-CM

## 2022-03-12 DIAGNOSIS — Z00.00 ROUTINE PHYSICAL EXAMINATION: ICD-10-CM

## 2022-03-12 LAB
ALBUMIN SERPL BCP-MCNC: 4.1 G/DL (ref 3.5–5.2)
ALP SERPL-CCNC: 45 U/L (ref 55–135)
ALT SERPL W/O P-5'-P-CCNC: 9 U/L (ref 10–44)
ANION GAP SERPL CALC-SCNC: 11 MMOL/L (ref 8–16)
AST SERPL-CCNC: 15 U/L (ref 10–40)
BASOPHILS # BLD AUTO: 0.04 K/UL (ref 0–0.2)
BASOPHILS NFR BLD: 0.7 % (ref 0–1.9)
BILIRUB SERPL-MCNC: 0.8 MG/DL (ref 0.1–1)
BUN SERPL-MCNC: 10 MG/DL (ref 10–30)
CALCIUM SERPL-MCNC: 10.6 MG/DL (ref 8.7–10.5)
CHLORIDE SERPL-SCNC: 102 MMOL/L (ref 95–110)
CHOLEST SERPL-MCNC: 246 MG/DL (ref 120–199)
CHOLEST/HDLC SERPL: 3.7 {RATIO} (ref 2–5)
CO2 SERPL-SCNC: 26 MMOL/L (ref 23–29)
COMPLEXED PSA SERPL-MCNC: 10.7 NG/ML (ref 0–4)
CREAT SERPL-MCNC: 0.8 MG/DL (ref 0.5–1.4)
DIFFERENTIAL METHOD: ABNORMAL
EOSINOPHIL # BLD AUTO: 0.1 K/UL (ref 0–0.5)
EOSINOPHIL NFR BLD: 1.6 % (ref 0–8)
ERYTHROCYTE [DISTWIDTH] IN BLOOD BY AUTOMATED COUNT: 13.9 % (ref 11.5–14.5)
EST. GFR  (AFRICAN AMERICAN): >60 ML/MIN/1.73 M^2
EST. GFR  (NON AFRICAN AMERICAN): >60 ML/MIN/1.73 M^2
ESTIMATED AVG GLUCOSE: 111 MG/DL (ref 68–131)
GLUCOSE SERPL-MCNC: 75 MG/DL (ref 70–110)
HBA1C MFR BLD: 5.5 % (ref 4–5.6)
HCT VFR BLD AUTO: 42.6 % (ref 40–54)
HDLC SERPL-MCNC: 66 MG/DL (ref 40–75)
HDLC SERPL: 26.8 % (ref 20–50)
HGB BLD-MCNC: 13.8 G/DL (ref 14–18)
IMM GRANULOCYTES # BLD AUTO: 0 K/UL (ref 0–0.04)
IMM GRANULOCYTES NFR BLD AUTO: 0 % (ref 0–0.5)
LDLC SERPL CALC-MCNC: 161 MG/DL (ref 63–159)
LYMPHOCYTES # BLD AUTO: 2.2 K/UL (ref 1–4.8)
LYMPHOCYTES NFR BLD: 38.8 % (ref 18–48)
MCH RBC QN AUTO: 28.5 PG (ref 27–31)
MCHC RBC AUTO-ENTMCNC: 32.4 G/DL (ref 32–36)
MCV RBC AUTO: 88 FL (ref 82–98)
MONOCYTES # BLD AUTO: 0.4 K/UL (ref 0.3–1)
MONOCYTES NFR BLD: 6.7 % (ref 4–15)
NEUTROPHILS # BLD AUTO: 3 K/UL (ref 1.8–7.7)
NEUTROPHILS NFR BLD: 52.2 % (ref 38–73)
NONHDLC SERPL-MCNC: 180 MG/DL
NRBC BLD-RTO: 0 /100 WBC
PLATELET # BLD AUTO: 159 K/UL (ref 150–450)
PMV BLD AUTO: 12.4 FL (ref 9.2–12.9)
POTASSIUM SERPL-SCNC: 4.5 MMOL/L (ref 3.5–5.1)
PROT SERPL-MCNC: 7.7 G/DL (ref 6–8.4)
RBC # BLD AUTO: 4.85 M/UL (ref 4.6–6.2)
SODIUM SERPL-SCNC: 139 MMOL/L (ref 136–145)
T4 FREE SERPL-MCNC: 1.09 NG/DL (ref 0.71–1.51)
TRIGL SERPL-MCNC: 95 MG/DL (ref 30–150)
TSH SERPL DL<=0.005 MIU/L-ACNC: 1.87 UIU/ML (ref 0.4–4)
WBC # BLD AUTO: 5.69 K/UL (ref 3.9–12.7)

## 2022-03-12 PROCEDURE — 84439 ASSAY OF FREE THYROXINE: CPT | Performed by: FAMILY MEDICINE

## 2022-03-12 PROCEDURE — 85025 COMPLETE CBC W/AUTO DIFF WBC: CPT | Performed by: FAMILY MEDICINE

## 2022-03-12 PROCEDURE — 80061 LIPID PANEL: CPT | Performed by: FAMILY MEDICINE

## 2022-03-12 PROCEDURE — 84153 ASSAY OF PSA TOTAL: CPT | Performed by: FAMILY MEDICINE

## 2022-03-12 PROCEDURE — 80053 COMPREHEN METABOLIC PANEL: CPT | Performed by: FAMILY MEDICINE

## 2022-03-12 PROCEDURE — 84443 ASSAY THYROID STIM HORMONE: CPT | Performed by: FAMILY MEDICINE

## 2022-03-12 PROCEDURE — 83036 HEMOGLOBIN GLYCOSYLATED A1C: CPT | Performed by: FAMILY MEDICINE

## 2022-03-12 PROCEDURE — 36415 COLL VENOUS BLD VENIPUNCTURE: CPT | Mod: PO | Performed by: FAMILY MEDICINE

## 2022-06-08 NOTE — ED NOTES
Bed: [facility]  Expected date:   Expected time:   Means of arrival:   Comments:   Protopic Counseling: Patient may experience a mild burning sensation during topical application. Protopic is not approved in children less than 2 years of age. There have been case reports of hematologic and skin malignancies in patients using topical calcineurin inhibitors although causality is questionable.

## 2022-06-21 ENCOUNTER — OFFICE VISIT (OUTPATIENT)
Dept: HOME HEALTH SERVICES | Facility: CLINIC | Age: 87
End: 2022-06-21
Payer: MEDICARE

## 2022-06-21 VITALS
DIASTOLIC BLOOD PRESSURE: 60 MMHG | HEART RATE: 74 BPM | BODY MASS INDEX: 22.49 KG/M2 | WEIGHT: 135 LBS | TEMPERATURE: 98 F | HEIGHT: 65 IN | SYSTOLIC BLOOD PRESSURE: 133 MMHG

## 2022-06-21 DIAGNOSIS — Z85.038 HISTORY OF COLON CANCER: Chronic | ICD-10-CM

## 2022-06-21 DIAGNOSIS — I70.0 ATHEROSCLEROSIS OF AORTA: ICD-10-CM

## 2022-06-21 DIAGNOSIS — Z00.00 ENCOUNTER FOR PREVENTIVE HEALTH EXAMINATION: Primary | ICD-10-CM

## 2022-06-21 DIAGNOSIS — I10 UNCONTROLLED HYPERTENSION: ICD-10-CM

## 2022-06-21 DIAGNOSIS — Z93.2 ILEOSTOMY IN PLACE: ICD-10-CM

## 2022-06-21 DIAGNOSIS — K21.9 GASTROESOPHAGEAL REFLUX DISEASE, UNSPECIFIED WHETHER ESOPHAGITIS PRESENT: ICD-10-CM

## 2022-06-21 DIAGNOSIS — R97.20 ELEVATED PSA: ICD-10-CM

## 2022-06-21 DIAGNOSIS — E55.9 VITAMIN D DEFICIENCY: ICD-10-CM

## 2022-06-21 PROCEDURE — G0439 PR MEDICARE ANNUAL WELLNESS SUBSEQUENT VISIT: ICD-10-PCS | Mod: S$GLB,,, | Performed by: NURSE PRACTITIONER

## 2022-06-21 PROCEDURE — 1101F PT FALLS ASSESS-DOCD LE1/YR: CPT | Mod: CPTII,S$GLB,, | Performed by: NURSE PRACTITIONER

## 2022-06-21 PROCEDURE — 1160F RVW MEDS BY RX/DR IN RCRD: CPT | Mod: CPTII,S$GLB,, | Performed by: NURSE PRACTITIONER

## 2022-06-21 PROCEDURE — 3288F PR FALLS RISK ASSESSMENT DOCUMENTED: ICD-10-PCS | Mod: CPTII,S$GLB,, | Performed by: NURSE PRACTITIONER

## 2022-06-21 PROCEDURE — 1159F MED LIST DOCD IN RCRD: CPT | Mod: CPTII,S$GLB,, | Performed by: NURSE PRACTITIONER

## 2022-06-21 PROCEDURE — 1126F AMNT PAIN NOTED NONE PRSNT: CPT | Mod: CPTII,S$GLB,, | Performed by: NURSE PRACTITIONER

## 2022-06-21 PROCEDURE — 1160F PR REVIEW ALL MEDS BY PRESCRIBER/CLIN PHARMACIST DOCUMENTED: ICD-10-PCS | Mod: CPTII,S$GLB,, | Performed by: NURSE PRACTITIONER

## 2022-06-21 PROCEDURE — G0439 PPPS, SUBSEQ VISIT: HCPCS | Mod: S$GLB,,, | Performed by: NURSE PRACTITIONER

## 2022-06-21 PROCEDURE — 1126F PR PAIN SEVERITY QUANTIFIED, NO PAIN PRESENT: ICD-10-PCS | Mod: CPTII,S$GLB,, | Performed by: NURSE PRACTITIONER

## 2022-06-21 PROCEDURE — 1159F PR MEDICATION LIST DOCUMENTED IN MEDICAL RECORD: ICD-10-PCS | Mod: CPTII,S$GLB,, | Performed by: NURSE PRACTITIONER

## 2022-06-21 PROCEDURE — 3288F FALL RISK ASSESSMENT DOCD: CPT | Mod: CPTII,S$GLB,, | Performed by: NURSE PRACTITIONER

## 2022-06-21 PROCEDURE — 1101F PR PT FALLS ASSESS DOC 0-1 FALLS W/OUT INJ PAST YR: ICD-10-PCS | Mod: CPTII,S$GLB,, | Performed by: NURSE PRACTITIONER

## 2022-06-21 NOTE — PATIENT INSTRUCTIONS
Counseling and Referral of Other Preventative  (Italic type indicates deductible and co-insurance are waived)    Patient Name: Cale Anthony  Today's Date: 6/21/2022    Health Maintenance       Date Due Completion Date    TETANUS VACCINE Never done ---    Shingles Vaccine (1 of 2) Never done ---    Pneumococcal Vaccines (Age 65+) (1 - PCV) Never done ---    COVID-19 Vaccine (2 - Pfizer series) 01/07/2022 12/17/2021    Influenza Vaccine (Season Ended) 09/01/2022 ---    Lipid Panel 03/12/2027 3/12/2022        No orders of the defined types were placed in this encounter.    The following information is provided to all patients.  This information is to help you find resources for any of the problems found today that may be affecting your health:                Living healthy guide: www.[facility].[place].gov      Understanding Diabetes: www.diabetes.org      Eating healthy: www.cdc.gov/healthyweight      [facility] home safety checklist: www.cdc.gov/steadi/patient.html      Agency on Aging: www.goea.[place].[facility]      Alcoholics anonymous (AA): www.aa.org      Physical Activity: www.warren.nih.gov/rb7mwzp      Tobacco use: www.quitwithusla.org

## 2022-06-21 NOTE — PROGRESS NOTES
"  Cale Anthony presented for a  Medicare AWV and comprehensive Health Risk Assessment today. The following components were reviewed and updated:    · Medical history  · Family History  · Social history  · Allergies and Current Medications  · Health Risk Assessment  · Health Maintenance  · Care Team         ** See Completed Assessments for Annual Wellness Visit within the encounter summary.**         The following assessments were completed:  · Living Situation  · CAGE  · Depression Screening  · Timed Get Up and Go  · Whisper Test  · Cognitive Function Screening  ·   ·   ·   · Nutrition Screening  · ADL Screening  · PAQ Screening        Vitals:    06/21/22 0901   BP: 133/60   Pulse: 74   Temp: 97.9 °F (36.6 °C)   Weight: 61.2 kg (135 lb)   Height: 5' 5" (1.651 m)     Body mass index is 22.47 kg/m².  Physical Exam  Constitutional:       Appearance: Normal appearance.   HENT:      Head: Normocephalic and atraumatic.      Nose: Nose normal.      Mouth/Throat:      Mouth: Mucous membranes are moist.   Eyes:      Extraocular Movements: Extraocular movements intact.   Cardiovascular:      Rate and Rhythm: Normal rate and regular rhythm.      Heart sounds: Normal heart sounds.   Pulmonary:      Effort: Pulmonary effort is normal. No respiratory distress.      Breath sounds: Normal breath sounds.   Abdominal:      General: Bowel sounds are normal. There is no distension.      Palpations: Abdomen is soft.      Comments: Ileostomy present   Musculoskeletal:         General: Normal range of motion.      Cervical back: Normal range of motion.   Skin:     General: Skin is warm and dry.   Neurological:      General: No focal deficit present.      Mental Status: He is alert and oriented to person, place, and time.   Psychiatric:         Mood and Affect: Mood normal.         Behavior: Behavior normal.               Diagnoses and health risks identified today and associated recommendations/orders:    1. Encounter for preventive " health examination  Assessments completed. Preventive measures and health maintenance reviewed with patient.    2. Ileostomy in place  Stable, followed by PCP.    3. Atherosclerosis of aorta  Stable, followed by PCP.    4. Uncontrolled hypertension  Stable, patient recently started on Amlodipine 5mg. Followed by PCP.    5. Vitamin D deficiency  Stable, followed by PCP.    6. Elevated PSA  Stable, followed by PCP. Last PSA 10.7.    7. Gastroesophageal reflux disease, unspecified whether esophagitis present  Stable, followed by PCP.    8. History of colon cancer (followed by GI)  Stable, followed by PCP.    Provided Cale with a 5-10 year written screening schedule and personal prevention plan. Recommendations were developed using the USPSTF age appropriate recommendations. Education, counseling, and referrals were provided as needed. After Visit Summary printed and given to patient which includes a list of additional screenings\tests needed.    Follow up in about 1 year (around 6/21/2023) for your next annual wellness visit.    Kelley Hopper, CARMEN  I offered to discuss advanced care planning, including how to pick a person who would make decisions for you if you were unable to make them for yourself, called a health care power of , and what kind of decisions you might make such as use of life sustaining treatments such as ventilators and tube feeding when faced with a life limiting illness recorded on a living will that they will need to know. (How you want to be cared for as you near the end of your natural life)     X Patient is interested in learning more about how to make advanced directives.  I provided them paperwork and offered to discuss this with them.

## 2022-06-24 PROBLEM — I10 UNCONTROLLED HYPERTENSION: Status: ACTIVE | Noted: 2022-06-24

## 2022-06-24 PROBLEM — I70.0 ATHEROSCLEROSIS OF AORTA: Status: ACTIVE | Noted: 2022-06-24

## 2022-06-24 PROBLEM — E55.9 VITAMIN D DEFICIENCY: Status: ACTIVE | Noted: 2022-06-24

## 2022-06-24 PROBLEM — R97.20 ELEVATED PSA: Status: ACTIVE | Noted: 2022-06-24

## 2025-02-21 ENCOUNTER — PATIENT OUTREACH (OUTPATIENT)
Dept: ADMINISTRATIVE | Facility: HOSPITAL | Age: OVER 89
End: 2025-02-21

## (undated) DEVICE — SUT CTD VICRYL 0 VIL BR/CT

## (undated) DEVICE — SEE MEDLINE ITEM 156902

## (undated) DEVICE — TRAY FOLEY 16FR INFECTION CONT

## (undated) DEVICE — ELECTRODE REM PLYHSV RETURN 9

## (undated) DEVICE — DEVICE ENSEAL X1 LARGE JAW

## (undated) DEVICE — SET IRR URLGY 2LINE UNIV SPIKE

## (undated) DEVICE — RELOAD PROXIMATE CUT BLUE 75MM

## (undated) DEVICE — SEE MEDLINE ITEM 157144

## (undated) DEVICE — NDL 22GA X1 1/2 REG BEVEL

## (undated) DEVICE — SUT 2-0 NYLON D/A

## (undated) DEVICE — SUT MCRYL PLUS 4-0 PS2 27IN

## (undated) DEVICE — SOL NS 1000CC

## (undated) DEVICE — DRESSING ADH ISLAND 3.6 X 14

## (undated) DEVICE — DRAPE ABDOMINAL TIBURON 14X11

## (undated) DEVICE — SEE MEDLINE ITEM 146347

## (undated) DEVICE — COVER LIGHT HANDLE 80/CA

## (undated) DEVICE — LEGGINGS 48X31 INCH

## (undated) DEVICE — NDL BOX COUNTER

## (undated) DEVICE — SEE MEDLINE ITEM 157181

## (undated) DEVICE — Device

## (undated) DEVICE — SUT ETHILON 2-0 PSLX 30IN

## (undated) DEVICE — CUTTER PROXIMATE BLUE 75MM

## (undated) DEVICE — LUBRICANT SURGILUBE 2 OZ

## (undated) DEVICE — SEE MEDLINE ITEM 146417

## (undated) DEVICE — POUCH EAKIN FIST WOUND 9.7X6.3

## (undated) DEVICE — SUT 1 48IN PDS II VIO MONO

## (undated) DEVICE — SYR 30CC LUER LOCK

## (undated) DEVICE — SEE MEDLINE ITEM 157117